# Patient Record
Sex: MALE | Race: WHITE | NOT HISPANIC OR LATINO | Employment: OTHER | URBAN - NONMETROPOLITAN AREA
[De-identification: names, ages, dates, MRNs, and addresses within clinical notes are randomized per-mention and may not be internally consistent; named-entity substitution may affect disease eponyms.]

---

## 2023-03-15 DIAGNOSIS — N52.9 ERECTILE DYSFUNCTION, UNSPECIFIED ERECTILE DYSFUNCTION TYPE: Primary | ICD-10-CM

## 2023-03-15 RX ORDER — ROSUVASTATIN CALCIUM 40 MG/1
1 TABLET, COATED ORAL DAILY
COMMUNITY
Start: 2022-09-21 | End: 2023-10-04 | Stop reason: ALTCHOICE

## 2023-03-15 RX ORDER — ROPINIROLE 3 MG/1
3 TABLET, FILM COATED ORAL DAILY
COMMUNITY
End: 2023-05-26 | Stop reason: SDUPTHER

## 2023-03-15 RX ORDER — TADALAFIL 2.5 MG/1
2.5 TABLET ORAL DAILY
COMMUNITY
Start: 2023-01-06 | End: 2023-03-28

## 2023-03-15 RX ORDER — TADALAFIL 2.5 MG/1
2.5 TABLET ORAL DAILY
Qty: 30 TABLET | Refills: 2 | OUTPATIENT
Start: 2023-03-15

## 2023-03-15 RX ORDER — POLYETHYLENE GLYCOL 3350 17 G/17G
17 POWDER, FOR SOLUTION ORAL DAILY
COMMUNITY
Start: 2022-11-12 | End: 2023-06-29 | Stop reason: WASHOUT

## 2023-03-22 PROBLEM — I10 HYPERTENSION: Status: ACTIVE | Noted: 2023-03-22

## 2023-03-22 PROBLEM — K44.9 HIATAL HERNIA: Status: ACTIVE | Noted: 2023-03-22

## 2023-03-22 PROBLEM — R93.1 ABNORMAL CT SCAN OF HEART: Status: ACTIVE | Noted: 2023-03-22

## 2023-03-22 PROBLEM — E66.811 CLASS 1 OBESITY DUE TO EXCESS CALORIES WITH SERIOUS COMORBIDITY AND BODY MASS INDEX (BMI) OF 30.0 TO 30.9 IN ADULT: Status: ACTIVE | Noted: 2023-03-22

## 2023-03-22 PROBLEM — E78.5 HYPERLIPEMIA: Status: ACTIVE | Noted: 2023-03-22

## 2023-03-22 PROBLEM — G25.81 RESTLESS LEGS SYNDROME: Status: ACTIVE | Noted: 2023-03-22

## 2023-03-22 PROBLEM — I83.891 VARICOSE VEINS OF RIGHT LOWER EXTREMITY WITH COMPLICATIONS: Status: ACTIVE | Noted: 2023-03-22

## 2023-03-22 PROBLEM — N52.9 ERECTILE DYSFUNCTION: Status: ACTIVE | Noted: 2023-03-22

## 2023-03-22 PROBLEM — E66.3 OVERWEIGHT WITH BODY MASS INDEX (BMI) OF 29 TO 29.9 IN ADULT: Status: ACTIVE | Noted: 2023-03-22

## 2023-03-22 PROBLEM — R00.0 SINUS TACHYCARDIA: Status: ACTIVE | Noted: 2023-03-22

## 2023-03-22 PROBLEM — R00.2 PALPITATIONS: Status: ACTIVE | Noted: 2023-03-22

## 2023-03-22 PROBLEM — R91.8 LUNG NODULES: Status: ACTIVE | Noted: 2023-03-22

## 2023-03-22 PROBLEM — E55.9 VITAMIN D DEFICIENCY: Status: ACTIVE | Noted: 2023-03-22

## 2023-03-22 PROBLEM — F31.81 BIPOLAR II DISORDER (MULTI): Status: ACTIVE | Noted: 2023-03-22

## 2023-03-22 PROBLEM — I25.10 CAD (CORONARY ARTERY DISEASE): Status: ACTIVE | Noted: 2023-03-22

## 2023-03-22 PROBLEM — E66.09 CLASS 1 OBESITY DUE TO EXCESS CALORIES WITH SERIOUS COMORBIDITY AND BODY MASS INDEX (BMI) OF 30.0 TO 30.9 IN ADULT: Status: ACTIVE | Noted: 2023-03-22

## 2023-03-22 PROBLEM — E66.811 CLASS 1 OBESITY DUE TO EXCESS CALORIES WITH SERIOUS COMORBIDITY AND BODY MASS INDEX (BMI) OF 31.0 TO 31.9 IN ADULT: Status: ACTIVE | Noted: 2023-03-22

## 2023-03-22 PROBLEM — E66.09 CLASS 1 OBESITY DUE TO EXCESS CALORIES WITH SERIOUS COMORBIDITY AND BODY MASS INDEX (BMI) OF 31.0 TO 31.9 IN ADULT: Status: ACTIVE | Noted: 2023-03-22

## 2023-03-22 PROBLEM — Z95.1 S/P CABG X 2: Status: ACTIVE | Noted: 2023-03-22

## 2023-03-22 RX ORDER — DULOXETIN HYDROCHLORIDE 30 MG/1
30 CAPSULE, DELAYED RELEASE ORAL DAILY
COMMUNITY
End: 2023-03-28 | Stop reason: SDUPTHER

## 2023-03-22 RX ORDER — ASCORBIC ACID 250 MG
500 TABLET ORAL DAILY
COMMUNITY

## 2023-03-22 RX ORDER — LAMOTRIGINE 200 MG/1
1 TABLET ORAL DAILY
COMMUNITY
End: 2023-05-01 | Stop reason: SDUPTHER

## 2023-03-22 RX ORDER — VIT C/E/ZN/COPPR/LUTEIN/ZEAXAN 250MG-90MG
CAPSULE ORAL
COMMUNITY

## 2023-03-22 RX ORDER — ISOSORBIDE MONONITRATE 30 MG/1
TABLET, EXTENDED RELEASE ORAL
COMMUNITY
End: 2023-06-29 | Stop reason: WASHOUT

## 2023-03-22 RX ORDER — ACETAMINOPHEN 325 MG/1
2 TABLET ORAL EVERY 6 HOURS PRN
COMMUNITY
End: 2023-06-29 | Stop reason: WASHOUT

## 2023-03-22 RX ORDER — TADALAFIL 5 MG/1
5 TABLET ORAL DAILY
COMMUNITY
End: 2023-03-24 | Stop reason: SDUPTHER

## 2023-03-22 RX ORDER — ASPIRIN 81 MG/1
1 TABLET ORAL DAILY
COMMUNITY
End: 2023-10-04 | Stop reason: SDUPTHER

## 2023-03-22 RX ORDER — DULOXETIN HYDROCHLORIDE 60 MG/1
1 CAPSULE, DELAYED RELEASE ORAL NIGHTLY
COMMUNITY
End: 2023-03-28 | Stop reason: SDUPTHER

## 2023-03-22 RX ORDER — METOPROLOL TARTRATE 50 MG/1
1.5 TABLET ORAL 2 TIMES DAILY
COMMUNITY
End: 2023-03-28 | Stop reason: SDUPTHER

## 2023-03-24 RX ORDER — TADALAFIL 5 MG/1
5 TABLET ORAL DAILY
Qty: 30 TABLET | Refills: 0 | Status: SHIPPED | OUTPATIENT
Start: 2023-03-24 | End: 2023-03-28 | Stop reason: SDUPTHER

## 2023-03-27 NOTE — PROGRESS NOTES
Subjective     Martín Mccormick is a 57 y.o. male who presents for No chief complaint on file..      HPI  The patient is a 57 year-old male presenting to the clinic for follow up on medications.   Discussed blood pressure medication.  Continue taking as directed.     He is scheduled with cardiologist.      Follow up in 6 months.      Educated on coronary artery disease.  Advised to keep up on the cardiologist.  Advised to call 911 if develops chest pain and/or heart palpitations and/or shortness of breath.  Educated on erectile dysfunction and continue the medication.  He is aware of combination effect with nitroglycerin and priapism and sudden sensorineural hearing loss.  Educated on vitamin deficiency.  At least of lung nodules.  Recommended CT scan of the chest every year.  Educated hypertension low-salt diet exercise.  Keep up on the cardiologist.  Educated on obesity and diet and exercise.  Advised to lose weight.  Educated on hyperlipidemia and diet exercise.  Has history of bipolar disorder.  Does not wish to go to the counselor for counseling.  Did not wish to go see the psychiatrist.  Discussed about medication dosage and adjustment.  Denies depression.  Denies suicidal.  Denies homicidal.                  Review of Systems  Review of systems  General.  Denies fever.  Denies chills.  HEENT denies nasal congestion.  Denies sinus pressure.  Respiratory.  Denies cough.  Denies shortness of breath.    Cardiovascular.  Denies chest pain.  Denies heart palpitations.  Denies shortness of breath.    Gastrointestinal.  Denies nausea vomiting diarrhea.  Denies abdominal pain.    Genitourinary denies burning urination.  Denies frequent urination.  Denies flank pain.  Denies blood in the urine.  Denies abnormal vaginal discharge.    Neurology.  Denies tingling numbness but denies weakness.  Denies headache.  Denies blurred vision.    Musculoskeletal.  Denies body aches.  Denies joint pains.  Denies muscle aches.  Denies  "muscle weakness    Endocrinology.  Denies cold intolerance.  Denies hot intolerance.    Psychiatric.  Denies depression.  Denies anxiety.  Denies suicidal.  Denies homicidal.                  Objective   /84 (BP Location: Left arm, Patient Position: Sitting, BP Cuff Size: Large adult)   Pulse 86   Ht 1.778 m (5' 10\")   Wt 101 kg (222 lb)   SpO2 99%   BMI 31.85 kg/m²        Physical Exam  General.  Not in distress.  HEENT normocephalic anicteric sclerae.  Neck soft supple no thyromegaly.  No carotid bruit.  Lungs are clear.  Heart regular.  Abdomen soft nontender nondistended bowel sounds are positive.  Extremities no clubbing cyanosis or edema.  Psychiatric.  Has good eye contact.  No crying spells noted.  Speech was normal.  Denies depression.  Denies suicidal.  Denies homicidal.      Assessment/Plan   1.  Coronary artery disease.  Dictated as above.    2.  Rectal dysfunction.  Dictated as above    3.  Vitamin D deficiency.  Educated on vitamin D deficiency we will continue monitor    4.  Lung nodules.  Dictated as above    5.  Primary hypertension.  Dictated as above.    6.  Obesity.  Dictated as above.    7.  Hyperlipidemia.  Educated on diet exercise.  Advised to lose weight we will continue monitor.    8.  Bipolar disorder.  Denies depression.  Denies suicidal.  Denies homicidal.  Explained adverse effects medication.                                   Problem List Items Addressed This Visit          Respiratory    Lung nodules       Circulatory    Hypertension    Relevant Medications    DULoxetine (Cymbalta) 60 mg DR capsule    metoprolol tartrate (Lopressor) 50 mg tablet    CAD (coronary artery disease) - Primary    Relevant Medications    metoprolol tartrate (Lopressor) 50 mg tablet    tadalafil (Cialis) 5 mg tablet    S/P CABG x 2       Genitourinary    Erectile dysfunction    Relevant Medications    tadalafil (Cialis) 5 mg tablet       Endocrine/Metabolic    Vitamin D deficiency    Class 1 " obesity due to excess calories with serious comorbidity and body mass index (BMI) of 31.0 to 31.9 in adult       Other    Bipolar II disorder (CMS/HCC)    Relevant Medications    DULoxetine (Cymbalta) 30 mg DR capsule    DULoxetine (Cymbalta) 60 mg DR capsule    Hyperlipemia       Scribe Attestation  By signing my name below, IEmma Scribe   attest that this documentation has been prepared under the direction and in the presence of Paul Napier MD.

## 2023-03-28 ENCOUNTER — OFFICE VISIT (OUTPATIENT)
Dept: PRIMARY CARE | Facility: CLINIC | Age: 58
End: 2023-03-28
Payer: COMMERCIAL

## 2023-03-28 VITALS
DIASTOLIC BLOOD PRESSURE: 84 MMHG | BODY MASS INDEX: 31.78 KG/M2 | SYSTOLIC BLOOD PRESSURE: 128 MMHG | WEIGHT: 222 LBS | OXYGEN SATURATION: 99 % | HEIGHT: 70 IN | HEART RATE: 86 BPM

## 2023-03-28 DIAGNOSIS — E78.2 MIXED HYPERLIPIDEMIA: ICD-10-CM

## 2023-03-28 DIAGNOSIS — E55.9 VITAMIN D DEFICIENCY: ICD-10-CM

## 2023-03-28 DIAGNOSIS — N52.9 ERECTILE DYSFUNCTION, UNSPECIFIED ERECTILE DYSFUNCTION TYPE: ICD-10-CM

## 2023-03-28 DIAGNOSIS — I10 PRIMARY HYPERTENSION: ICD-10-CM

## 2023-03-28 DIAGNOSIS — E66.09 CLASS 1 OBESITY DUE TO EXCESS CALORIES WITH SERIOUS COMORBIDITY AND BODY MASS INDEX (BMI) OF 31.0 TO 31.9 IN ADULT: ICD-10-CM

## 2023-03-28 DIAGNOSIS — F31.81 BIPOLAR II DISORDER (MULTI): ICD-10-CM

## 2023-03-28 DIAGNOSIS — R91.8 LUNG NODULES: ICD-10-CM

## 2023-03-28 DIAGNOSIS — I25.10 CORONARY ARTERY DISEASE INVOLVING NATIVE CORONARY ARTERY OF NATIVE HEART WITHOUT ANGINA PECTORIS: Primary | ICD-10-CM

## 2023-03-28 DIAGNOSIS — Z95.1 S/P CABG X 2: ICD-10-CM

## 2023-03-28 PROCEDURE — 3079F DIAST BP 80-89 MM HG: CPT | Performed by: FAMILY MEDICINE

## 2023-03-28 PROCEDURE — 1036F TOBACCO NON-USER: CPT | Performed by: FAMILY MEDICINE

## 2023-03-28 PROCEDURE — 3074F SYST BP LT 130 MM HG: CPT | Performed by: FAMILY MEDICINE

## 2023-03-28 PROCEDURE — 99214 OFFICE O/P EST MOD 30 MIN: CPT | Performed by: FAMILY MEDICINE

## 2023-03-28 PROCEDURE — 3008F BODY MASS INDEX DOCD: CPT | Performed by: FAMILY MEDICINE

## 2023-03-28 RX ORDER — DULOXETIN HYDROCHLORIDE 60 MG/1
60 CAPSULE, DELAYED RELEASE ORAL DAILY
Qty: 90 CAPSULE | Refills: 3 | Status: SHIPPED | OUTPATIENT
Start: 2023-03-28 | End: 2023-03-28

## 2023-03-28 RX ORDER — METOPROLOL TARTRATE 50 MG/1
75 TABLET ORAL 2 TIMES DAILY
Qty: 145 TABLET | Refills: 3 | Status: SHIPPED | OUTPATIENT
Start: 2023-03-28 | End: 2023-10-04 | Stop reason: SDUPTHER

## 2023-03-28 RX ORDER — TADALAFIL 5 MG/1
5 TABLET ORAL DAILY
Qty: 90 TABLET | Refills: 0 | Status: SHIPPED | OUTPATIENT
Start: 2023-03-28 | End: 2023-06-21

## 2023-03-28 RX ORDER — DULOXETIN HYDROCHLORIDE 30 MG/1
30 CAPSULE, DELAYED RELEASE ORAL DAILY
Qty: 90 CAPSULE | Refills: 3 | Status: SHIPPED | OUTPATIENT
Start: 2023-03-28 | End: 2023-03-28

## 2023-03-28 ASSESSMENT — PAIN SCALES - GENERAL: PAINLEVEL: 0-NO PAIN

## 2023-04-10 ENCOUNTER — APPOINTMENT (OUTPATIENT)
Dept: PRIMARY CARE | Facility: CLINIC | Age: 58
End: 2023-04-10
Payer: COMMERCIAL

## 2023-04-13 ENCOUNTER — TELEMEDICINE (OUTPATIENT)
Dept: PRIMARY CARE | Facility: CLINIC | Age: 58
End: 2023-04-13
Payer: COMMERCIAL

## 2023-04-13 DIAGNOSIS — J30.89 NON-SEASONAL ALLERGIC RHINITIS DUE TO OTHER ALLERGIC TRIGGER: ICD-10-CM

## 2023-04-13 DIAGNOSIS — J01.01 ACUTE RECURRENT MAXILLARY SINUSITIS: ICD-10-CM

## 2023-04-13 DIAGNOSIS — J40 BRONCHITIS: Primary | ICD-10-CM

## 2023-04-13 PROCEDURE — 99213 OFFICE O/P EST LOW 20 MIN: CPT | Performed by: FAMILY MEDICINE

## 2023-04-13 RX ORDER — PROMETHAZINE HYDROCHLORIDE AND DEXTROMETHORPHAN HYDROBROMIDE 6.25; 15 MG/5ML; MG/5ML
5 SYRUP ORAL 4 TIMES DAILY PRN
Qty: 140 ML | Refills: 0 | Status: SHIPPED | OUTPATIENT
Start: 2023-04-13 | End: 2023-04-20

## 2023-04-13 RX ORDER — AMOXICILLIN AND CLAVULANATE POTASSIUM 875; 125 MG/1; MG/1
875 TABLET, FILM COATED ORAL 2 TIMES DAILY
Qty: 20 TABLET | Refills: 0 | Status: SHIPPED | OUTPATIENT
Start: 2023-04-13 | End: 2023-04-23

## 2023-04-13 RX ORDER — METHYLPREDNISOLONE 4 MG/1
TABLET ORAL
Qty: 21 TABLET | Refills: 0 | Status: SHIPPED | OUTPATIENT
Start: 2023-04-13 | End: 2023-04-20

## 2023-04-13 RX ORDER — FLUTICASONE PROPIONATE 50 MCG
2 SPRAY, SUSPENSION (ML) NASAL DAILY
Qty: 16 G | Refills: 0 | Status: SHIPPED | OUTPATIENT
Start: 2023-04-13 | End: 2023-05-06

## 2023-04-13 RX ORDER — ALBUTEROL SULFATE 90 UG/1
2 AEROSOL, METERED RESPIRATORY (INHALATION) EVERY 4 HOURS PRN
Qty: 6.7 G | Refills: 0 | Status: SHIPPED | OUTPATIENT
Start: 2023-04-13 | End: 2023-06-29 | Stop reason: WASHOUT

## 2023-04-16 NOTE — PROGRESS NOTES
Subjective     Martín Mccormick is a 57 y.o. male who presents for Cough and URI (Cough/congestion).        This was completed via telephone due to the restrictions of the COVID-19 pandemic.  All issues as below were discussed and addressed but no physical exam was performed.  If it was felt that the patient should be evaluated in clinic then they were director there.  The patient/parent verbally consented to the visit.      HPI    Coughing and chest congestion.  Home COVID test was negative.  Sinus pressure facial pain headache and low-grade fever.  Sneezing and clearing throat.  Over-the-counter medications not helping.        Review of Systems  Dictated as above  Objective   Virtual visit  Physical Exam  Virtual visit  Assessment/Plan   1.  Bronchitis.  Home COVID test was negative.  Denies shortness of breath.  Advised to call 911 or to go to the emergency room as soon as possible if develops high fever or shortness of breath.    2.  Acute recurrent maxillary sinusitis but educated on sinusitis.  Advised to increase oral fluid intake    3.  Allergic rhinitis.  Educated on allergic rhinitis.  Advised to increase oral fluid intake.                Problem List Items Addressed This Visit    None  Visit Diagnoses       Bronchitis    -  Primary    Relevant Medications    amoxicillin-pot clavulanate (Augmentin) 875-125 mg tablet    methylPREDNISolone (Medrol Dospak) 4 mg tablets    promethazine-DM (Phenergan-DM) 6.25-15 mg/5 mL syrup    albuterol (Proventil HFA) 90 mcg/actuation inhaler    Acute recurrent maxillary sinusitis        Relevant Medications    amoxicillin-pot clavulanate (Augmentin) 875-125 mg tablet    methylPREDNISolone (Medrol Dospak) 4 mg tablets    Non-seasonal allergic rhinitis due to other allergic trigger        Relevant Medications    methylPREDNISolone (Medrol Dospak) 4 mg tablets    fluticasone (Flonase) 50 mcg/actuation nasal spray

## 2023-05-01 DIAGNOSIS — F31.9 BIPOLAR AFFECTIVE DISORDER, REMISSION STATUS UNSPECIFIED (MULTI): Primary | ICD-10-CM

## 2023-05-01 RX ORDER — LAMOTRIGINE 200 MG/1
200 TABLET ORAL DAILY
Qty: 90 TABLET | Refills: 0 | Status: SHIPPED | OUTPATIENT
Start: 2023-05-01 | End: 2023-07-26 | Stop reason: SDUPTHER

## 2023-05-26 DIAGNOSIS — G25.81 RESTLESS LEGS SYNDROME: ICD-10-CM

## 2023-05-26 RX ORDER — ROPINIROLE 3 MG/1
3 TABLET, FILM COATED ORAL DAILY
Qty: 90 TABLET | Refills: 0 | Status: SHIPPED | OUTPATIENT
Start: 2023-05-26 | End: 2023-08-23 | Stop reason: SDUPTHER

## 2023-06-10 DIAGNOSIS — N52.9 ERECTILE DYSFUNCTION, UNSPECIFIED ERECTILE DYSFUNCTION TYPE: ICD-10-CM

## 2023-06-10 DIAGNOSIS — J30.89 NON-SEASONAL ALLERGIC RHINITIS DUE TO OTHER ALLERGIC TRIGGER: ICD-10-CM

## 2023-06-12 RX ORDER — FLUTICASONE PROPIONATE 50 MCG
2 SPRAY, SUSPENSION (ML) NASAL DAILY
Qty: 16 ML | Refills: 0 | Status: SHIPPED | OUTPATIENT
Start: 2023-06-12 | End: 2023-06-26

## 2023-06-20 RX ORDER — TADALAFIL 5 MG/1
5 TABLET ORAL DAILY
Qty: 90 TABLET | Refills: 0 | OUTPATIENT
Start: 2023-06-20 | End: 2023-09-18

## 2023-06-21 ENCOUNTER — TELEPHONE (OUTPATIENT)
Dept: PRIMARY CARE | Facility: CLINIC | Age: 58
End: 2023-06-21
Payer: COMMERCIAL

## 2023-06-21 DIAGNOSIS — N52.9 ERECTILE DYSFUNCTION, UNSPECIFIED ERECTILE DYSFUNCTION TYPE: ICD-10-CM

## 2023-06-21 RX ORDER — TADALAFIL 5 MG/1
5 TABLET ORAL DAILY
Qty: 30 TABLET | Refills: 0 | Status: SHIPPED | OUTPATIENT
Start: 2023-06-21 | End: 2023-07-26 | Stop reason: SDUPTHER

## 2023-06-27 NOTE — PROGRESS NOTES
New patient here to establish care from Dr. Napier. Just started BP mendication yesterday from cardiology    This is a 58yo male here to establish care:    CAD, tachycardia, HTN: Following with cardiology, Dr. Edwards. He had cardiac bypass detected through calcium score. Bypass Nov 2022 (two vessel). Started on chlorthalidone. He is taking metoprolol and rosuvastatin.    Bipolar disorder: On cymbalta and lamictal, feels these are helping. No SE's.     RLS: He is taking higher dose ropinirole which is helping.    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 Radiation to the area anterior to his heart which contributed to his cardiac condition.      Review of systems completed and unremarkable other than what is documented in HPI.    Social history: he uses chewing tobacco, 3 dips a day, he drinks alcohol 4 bottles of beer per day, retired now for almost 2 years from   Medical history:  Medications: Vitamin D, MVI, flonase, aspirin, rosuvastatin, albuterol, tylenol, lamictal, ropinirole, duloxetine, tadalafil, Vitamin C, metoprolol, aspirin, chlorthalidone  SurgHx: bypass, cardiac, finger surgery for fracture, tonsillectomy  Fhx: mom has cardiac dx and DM, dad has HTN, grandmother had stroke, cousin who had B cell lymphoma. Maternal grandfather had cancer as well, thought to be lymphoma. Materna grandmother also had breast cancer but ultimately diedfrom a stroke.  Allergies: fluconazole    Gen: No acute distress. Alert and oriented x3.   HEENT: Normocephalic, atraumatic. PERRLA and EOMI, no conjunctival injection. B/L EAC are clear, TM's viewed are WNL. No rhinorrhea, no oropharyngeal lesions.  Neck: No lymphadenopathy, thyroid WNL.  CV: Regular rate and rhythm. Normal S1/S2.  Resp: CTAB/L. No wheezes or rhonchi appreciated.  Abdomen: Soft. Nontender. Nondistended. Bowel sounds normoactive. No guarding or rigidity.  Derm: Skin is warm and dry. No rashes  appreciated or suspicious lesions noted.   Neuro: Cranial nerves intact. Normal gait.  Psych: Appropriate mood and affect. Normal speech and eye contact.   Extremities: No deformities appreciated. No severe edema.     This is a 58yo male here to establish care:    #CAD  S/p two vessel bypass Nov 2022  Following with cardiology, Dr. Edwards  Currently on metoprolol and rosuvastatin  Starting chlorthalidone    #Bipolar disorder:   Reasonable control on cymbalta and lamictal    #RLS:   Controlled on ropinirole    #H/o lymphoblastic lymphoma:   T cell lymphoma  s/p chemo and radiation for this 12 years ago    #Vitamin D deficiency   On replacement    HCM:  Will discuss C-scope vs cologuard at followup  Monitoring yearly PSA

## 2023-06-28 LAB
CHOLESTEROL (MG/DL) IN SER/PLAS: 175 MG/DL (ref 0–199)
CHOLESTEROL IN HDL (MG/DL) IN SER/PLAS: 61.5 MG/DL
CHOLESTEROL/HDL RATIO: 2.8
LDL: 84 MG/DL (ref 0–99)
THYROTROPIN (MIU/L) IN SER/PLAS BY DETECTION LIMIT <= 0.05 MIU/L: 1.46 MIU/L (ref 0.44–3.98)
TRIGLYCERIDE (MG/DL) IN SER/PLAS: 146 MG/DL (ref 0–149)
VLDL: 29 MG/DL (ref 0–40)

## 2023-06-29 ENCOUNTER — OFFICE VISIT (OUTPATIENT)
Dept: PRIMARY CARE | Facility: CLINIC | Age: 58
End: 2023-06-29
Payer: COMMERCIAL

## 2023-06-29 VITALS
DIASTOLIC BLOOD PRESSURE: 92 MMHG | HEIGHT: 70 IN | SYSTOLIC BLOOD PRESSURE: 138 MMHG | BODY MASS INDEX: 31.35 KG/M2 | WEIGHT: 219 LBS | HEART RATE: 70 BPM

## 2023-06-29 DIAGNOSIS — F31.9 BIPOLAR 1 DISORDER (MULTI): ICD-10-CM

## 2023-06-29 DIAGNOSIS — E55.9 VITAMIN D DEFICIENCY: ICD-10-CM

## 2023-06-29 DIAGNOSIS — I25.10 CORONARY ARTERY DISEASE INVOLVING NATIVE CORONARY ARTERY OF NATIVE HEART WITHOUT ANGINA PECTORIS: ICD-10-CM

## 2023-06-29 DIAGNOSIS — Z12.5 SCREENING FOR PROSTATE CANCER: Primary | ICD-10-CM

## 2023-06-29 LAB
ESTIMATED AVERAGE GLUCOSE FOR HBA1C: 103 MG/DL
HEMOGLOBIN A1C/HEMOGLOBIN TOTAL IN BLOOD: 5.2 %

## 2023-06-29 PROCEDURE — 3075F SYST BP GE 130 - 139MM HG: CPT | Performed by: FAMILY MEDICINE

## 2023-06-29 PROCEDURE — 3008F BODY MASS INDEX DOCD: CPT | Performed by: FAMILY MEDICINE

## 2023-06-29 PROCEDURE — 3080F DIAST BP >= 90 MM HG: CPT | Performed by: FAMILY MEDICINE

## 2023-06-29 PROCEDURE — 99214 OFFICE O/P EST MOD 30 MIN: CPT | Performed by: FAMILY MEDICINE

## 2023-06-29 RX ORDER — CHLORTHALIDONE 25 MG/1
TABLET ORAL
COMMUNITY
Start: 2023-06-27 | End: 2023-10-04 | Stop reason: SDUPTHER

## 2023-07-26 DIAGNOSIS — N52.9 ERECTILE DYSFUNCTION, UNSPECIFIED ERECTILE DYSFUNCTION TYPE: ICD-10-CM

## 2023-07-26 DIAGNOSIS — F31.9 BIPOLAR AFFECTIVE DISORDER, REMISSION STATUS UNSPECIFIED (MULTI): ICD-10-CM

## 2023-07-26 RX ORDER — LAMOTRIGINE 200 MG/1
200 TABLET ORAL DAILY
Qty: 90 TABLET | Refills: 0 | Status: SHIPPED | OUTPATIENT
Start: 2023-07-26 | End: 2023-07-26

## 2023-07-26 RX ORDER — TADALAFIL 5 MG/1
5 TABLET ORAL DAILY
Qty: 30 TABLET | Refills: 0 | Status: SHIPPED | OUTPATIENT
Start: 2023-07-26 | End: 2023-09-07

## 2023-08-23 DIAGNOSIS — G25.81 RESTLESS LEGS SYNDROME: ICD-10-CM

## 2023-08-23 RX ORDER — ROPINIROLE 3 MG/1
3 TABLET, FILM COATED ORAL DAILY
Qty: 90 TABLET | Refills: 1 | Status: SHIPPED | OUTPATIENT
Start: 2023-08-23 | End: 2023-08-23

## 2023-09-07 DIAGNOSIS — N52.9 ERECTILE DYSFUNCTION, UNSPECIFIED ERECTILE DYSFUNCTION TYPE: ICD-10-CM

## 2023-09-07 RX ORDER — TADALAFIL 5 MG/1
5 TABLET ORAL DAILY
Qty: 90 TABLET | Refills: 0 | Status: SHIPPED | OUTPATIENT
Start: 2023-09-07 | End: 2023-09-07

## 2023-09-07 RX ORDER — ALUMINUM CHLORIDE 20 %
SOLUTION, NON-ORAL TOPICAL
COMMUNITY
Start: 2023-06-29 | End: 2023-12-30 | Stop reason: ALTCHOICE

## 2023-09-07 RX ORDER — TADALAFIL 5 MG/1
5 TABLET ORAL DAILY
Qty: 30 TABLET | Refills: 0 | Status: SHIPPED | OUTPATIENT
Start: 2023-09-07 | End: 2023-09-07 | Stop reason: SDUPTHER

## 2023-09-27 DIAGNOSIS — E34.9 TESTOSTERONE DEFICIENCY: ICD-10-CM

## 2023-09-27 NOTE — PROGRESS NOTES
"Martín Mccormick is a 57 y.o. male who presents for Follow-up (6 months; high BP, meds managed by cardio; had flu shot and 1st shingles vax already)    CAD, tachycardia, HTN: Following with cardiology, Dr. Edwards. He had cardiac bypass detected through calcium score. Bypass Nov 2022 (two vessel). Started on chlorthalidone. He is taking metoprolol and rosuvastatin.     Bipolar disorder: On cymbalta and lamictal, feels these are helping. No SE's.      RLS: He is taking higher dose ropinirole which is helping.     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 Radiation to the area anterior to his heart which contributed to his cardiac condition.       Review of systems completed and unremarkable other than what is documented in HPI.    Objective   BP (!) 148/97 (BP Location: Left arm, Patient Position: Sitting, BP Cuff Size: Large adult)   Pulse 81   Ht 1.778 m (5' 10\")   Wt 102 kg (225 lb)   BMI 32.28 kg/m²     Gen: No acute distress, alert and oriented x3, pleasant   HEENT: moist mucous membranes, b/l external auditory canals are clear of debris, TMs within normal limits, no oropharyngeal lesions, eomi, perrla   Neck: thyroid within normal limits, no lymphadenopathy   CV: RRR, normal S1/S2, no murmur   Resp: Clear to auscultation bilaterally, no wheezes or rhonchi appreciated  Abd: soft, nontender, non-distended, no guarding/rigidity, bowel sounds present  Extr: no edema, no calf tenderness  Derm: Skin is warm and dry, no rashes appreciated  Psych: mood is good, affect is congruent, good hygiene, normal speech and eye contact  Neuro: cranial nerves grossly intact, normal gait    Assessment/Plan     #CAD  S/p two vessel bypass Nov 2022  Following with cardiology, Dr. Edwards  Currently on metoprolol, chlorthalidone, and rosuvastatin  Increasing metoprolol today     #Bipolar disorder:   Reasonable control on cymbalta and lamictal     #RLS:   Controlled on " ropinirole     #H/o lymphoblastic lymphoma:   T cell lymphoma  s/p chemo and radiation for this 12 years ago     #Vitamin D deficiency   On replacement     HCM:  Will discuss C-scope vs cologuard at followup  Monitoring yearly PSA  S/p flu and shingles

## 2023-09-28 ENCOUNTER — APPOINTMENT (OUTPATIENT)
Dept: PRIMARY CARE | Facility: CLINIC | Age: 58
End: 2023-09-28
Payer: COMMERCIAL

## 2023-10-03 ENCOUNTER — LAB (OUTPATIENT)
Dept: LAB | Facility: LAB | Age: 58
End: 2023-10-03
Payer: COMMERCIAL

## 2023-10-03 DIAGNOSIS — F31.9 BIPOLAR 1 DISORDER (MULTI): ICD-10-CM

## 2023-10-03 DIAGNOSIS — E34.9 TESTOSTERONE DEFICIENCY: ICD-10-CM

## 2023-10-03 DIAGNOSIS — I25.10 CORONARY ARTERY DISEASE INVOLVING NATIVE CORONARY ARTERY OF NATIVE HEART WITHOUT ANGINA PECTORIS: ICD-10-CM

## 2023-10-03 DIAGNOSIS — Z12.5 SCREENING FOR PROSTATE CANCER: ICD-10-CM

## 2023-10-03 DIAGNOSIS — E55.9 VITAMIN D DEFICIENCY: ICD-10-CM

## 2023-10-03 LAB
ALBUMIN SERPL BCP-MCNC: 4.3 G/DL (ref 3.4–5)
ALP SERPL-CCNC: 69 U/L (ref 33–120)
ALT SERPL W P-5'-P-CCNC: 51 U/L (ref 10–52)
ANION GAP SERPL CALC-SCNC: 12 MMOL/L (ref 10–20)
AST SERPL W P-5'-P-CCNC: 48 U/L (ref 9–39)
BASOPHILS # BLD AUTO: 0.05 X10*3/UL (ref 0–0.1)
BASOPHILS NFR BLD AUTO: 1 %
BILIRUB SERPL-MCNC: 0.8 MG/DL (ref 0–1.2)
BUN SERPL-MCNC: 16 MG/DL (ref 6–23)
CALCIUM SERPL-MCNC: 9.5 MG/DL (ref 8.6–10.3)
CHLORIDE SERPL-SCNC: 99 MMOL/L (ref 98–107)
CHOLEST SERPL-MCNC: 191 MG/DL (ref 0–199)
CHOLESTEROL/HDL RATIO: 2.9
CO2 SERPL-SCNC: 32 MMOL/L (ref 21–32)
CREAT SERPL-MCNC: 0.85 MG/DL (ref 0.5–1.3)
EOSINOPHIL # BLD AUTO: 0.18 X10*3/UL (ref 0–0.7)
EOSINOPHIL NFR BLD AUTO: 3.5 %
ERYTHROCYTE [DISTWIDTH] IN BLOOD BY AUTOMATED COUNT: 12.4 % (ref 11.5–14.5)
GFR SERPL CREATININE-BSD FRML MDRD: >90 ML/MIN/1.73M*2
GLUCOSE SERPL-MCNC: 98 MG/DL (ref 74–99)
HCT VFR BLD AUTO: 47.3 % (ref 41–52)
HDLC SERPL-MCNC: 66 MG/DL
HGB BLD-MCNC: 16 G/DL (ref 13.5–17.5)
IMM GRANULOCYTES # BLD AUTO: 0.02 X10*3/UL (ref 0–0.7)
IMM GRANULOCYTES NFR BLD AUTO: 0.4 % (ref 0–0.9)
LDLC SERPL CALC-MCNC: 98 MG/DL (ref 140–190)
LYMPHOCYTES # BLD AUTO: 1.57 X10*3/UL (ref 1.2–4.8)
LYMPHOCYTES NFR BLD AUTO: 30.4 %
MCH RBC QN AUTO: 31.2 PG (ref 26–34)
MCHC RBC AUTO-ENTMCNC: 33.8 G/DL (ref 32–36)
MCV RBC AUTO: 92 FL (ref 80–100)
MONOCYTES # BLD AUTO: 0.42 X10*3/UL (ref 0.1–1)
MONOCYTES NFR BLD AUTO: 8.1 %
NEUTROPHILS # BLD AUTO: 2.92 X10*3/UL (ref 1.2–7.7)
NEUTROPHILS NFR BLD AUTO: 56.6 %
NON HDL CHOLESTEROL: 125 MG/DL (ref 0–149)
NRBC BLD-RTO: 0 /100 WBCS (ref 0–0)
PLATELET # BLD AUTO: 182 X10*3/UL (ref 150–450)
PMV BLD AUTO: 10.2 FL (ref 7.5–11.5)
POTASSIUM SERPL-SCNC: 4.2 MMOL/L (ref 3.5–5.3)
PROT SERPL-MCNC: 6.8 G/DL (ref 6.4–8.2)
RBC # BLD AUTO: 5.13 X10*6/UL (ref 4.5–5.9)
SODIUM SERPL-SCNC: 139 MMOL/L (ref 136–145)
TRIGL SERPL-MCNC: 134 MG/DL (ref 0–149)
VLDL: 27 MG/DL (ref 0–40)
WBC # BLD AUTO: 5.2 X10*3/UL (ref 4.4–11.3)

## 2023-10-03 PROCEDURE — 36415 COLL VENOUS BLD VENIPUNCTURE: CPT

## 2023-10-04 ENCOUNTER — OFFICE VISIT (OUTPATIENT)
Dept: PRIMARY CARE | Facility: CLINIC | Age: 58
End: 2023-10-04
Payer: COMMERCIAL

## 2023-10-04 ENCOUNTER — PHARMACY VISIT (OUTPATIENT)
Dept: PHARMACY | Facility: CLINIC | Age: 58
End: 2023-10-04
Payer: COMMERCIAL

## 2023-10-04 VITALS
BODY MASS INDEX: 32.21 KG/M2 | HEART RATE: 81 BPM | SYSTOLIC BLOOD PRESSURE: 148 MMHG | DIASTOLIC BLOOD PRESSURE: 97 MMHG | HEIGHT: 70 IN | WEIGHT: 225 LBS

## 2023-10-04 DIAGNOSIS — Z12.11 ENCOUNTER FOR SCREENING FOR MALIGNANT NEOPLASM OF COLON: ICD-10-CM

## 2023-10-04 DIAGNOSIS — I10 PRIMARY HYPERTENSION: Primary | ICD-10-CM

## 2023-10-04 LAB
25(OH)D3 SERPL-MCNC: 53 NG/ML (ref 30–100)
LAMOTRIGINE SERPL-MCNC: 4.8 UG/ML (ref 2.5–15)
PSA SERPL-MCNC: 1.07 NG/ML

## 2023-10-04 PROCEDURE — 3008F BODY MASS INDEX DOCD: CPT | Performed by: FAMILY MEDICINE

## 2023-10-04 PROCEDURE — 3080F DIAST BP >= 90 MM HG: CPT | Performed by: FAMILY MEDICINE

## 2023-10-04 PROCEDURE — 3077F SYST BP >= 140 MM HG: CPT | Performed by: FAMILY MEDICINE

## 2023-10-04 PROCEDURE — 99214 OFFICE O/P EST MOD 30 MIN: CPT | Performed by: FAMILY MEDICINE

## 2023-10-04 PROCEDURE — RXMED WILLOW AMBULATORY MEDICATION CHARGE

## 2023-10-04 RX ORDER — METOPROLOL SUCCINATE 50 MG/1
50 TABLET, EXTENDED RELEASE ORAL DAILY
Qty: 30 TABLET | Refills: 5 | Status: SHIPPED | OUTPATIENT
Start: 2023-10-04 | End: 2024-01-26 | Stop reason: WASHOUT

## 2023-10-17 ENCOUNTER — PREP FOR PROCEDURE (OUTPATIENT)
Dept: SURGERY | Facility: HOSPITAL | Age: 58
End: 2023-10-17
Payer: COMMERCIAL

## 2023-10-17 ENCOUNTER — PHARMACY VISIT (OUTPATIENT)
Dept: PHARMACY | Facility: CLINIC | Age: 58
End: 2023-10-17
Payer: COMMERCIAL

## 2023-10-17 ENCOUNTER — TELEPHONE (OUTPATIENT)
Dept: PRIMARY CARE | Facility: CLINIC | Age: 58
End: 2023-10-17
Payer: COMMERCIAL

## 2023-10-17 DIAGNOSIS — Z12.11 COLON CANCER SCREENING: ICD-10-CM

## 2023-10-17 DIAGNOSIS — Z12.11 SCREENING FOR COLON CANCER: Primary | ICD-10-CM

## 2023-10-17 PROCEDURE — RXMED WILLOW AMBULATORY MEDICATION CHARGE

## 2023-10-17 RX ORDER — SOD SULF/POT CHLORIDE/MAG SULF 1.479 G
TABLET ORAL
Qty: 24 TABLET | Refills: 0 | Status: SHIPPED | OUTPATIENT
Start: 2023-10-17 | End: 2024-01-26 | Stop reason: WASHOUT

## 2023-10-17 NOTE — TELEPHONE ENCOUNTER
[place] back from lab. It was drawn in [place] on 10/3 and sent to the send out lab on 10/4. There are no updates since then. Per lab supervisor, some results that are sent out are taking longer than normal due to Epic update and to give it a few more days. If there are no updates after that we are to call the lab back and have this escalated to client services to track down the sample.

## 2023-10-17 NOTE — TELEPHONE ENCOUNTER
Called outpatient lab to ask about Martín's testosterone level as it was drawn 10/3 and still active-in process. Spoke with Angie at the lab and am awaiting call back.

## 2023-10-19 ENCOUNTER — PHARMACY VISIT (OUTPATIENT)
Dept: PHARMACY | Facility: CLINIC | Age: 58
End: 2023-10-19
Payer: COMMERCIAL

## 2023-10-19 DIAGNOSIS — F31.9 BIPOLAR AFFECTIVE DISORDER, REMISSION STATUS UNSPECIFIED (MULTI): ICD-10-CM

## 2023-10-19 PROCEDURE — RXMED WILLOW AMBULATORY MEDICATION CHARGE

## 2023-10-19 RX ORDER — LAMOTRIGINE 200 MG/1
200 TABLET ORAL DAILY
Qty: 90 TABLET | Refills: 0 | Status: SHIPPED | OUTPATIENT
Start: 2023-10-19 | End: 2023-12-04 | Stop reason: SDUPTHER

## 2023-11-07 ENCOUNTER — APPOINTMENT (OUTPATIENT)
Dept: ORTHOPEDIC SURGERY | Facility: CLINIC | Age: 58
End: 2023-11-07
Payer: COMMERCIAL

## 2023-11-09 ENCOUNTER — PHARMACY VISIT (OUTPATIENT)
Dept: PHARMACY | Facility: CLINIC | Age: 58
End: 2023-11-09
Payer: COMMERCIAL

## 2023-11-09 PROCEDURE — RXMED WILLOW AMBULATORY MEDICATION CHARGE

## 2023-11-28 ENCOUNTER — APPOINTMENT (OUTPATIENT)
Dept: ORTHOPEDIC SURGERY | Facility: CLINIC | Age: 58
End: 2023-11-28
Payer: COMMERCIAL

## 2023-11-28 DIAGNOSIS — M25.551 BILATERAL HIP PAIN: Primary | ICD-10-CM

## 2023-11-28 DIAGNOSIS — M25.552 BILATERAL HIP PAIN: Primary | ICD-10-CM

## 2023-11-29 ENCOUNTER — PHARMACY VISIT (OUTPATIENT)
Dept: PHARMACY | Facility: CLINIC | Age: 58
End: 2023-11-29
Payer: COMMERCIAL

## 2023-11-29 DIAGNOSIS — N52.9 ERECTILE DYSFUNCTION, UNSPECIFIED ERECTILE DYSFUNCTION TYPE: ICD-10-CM

## 2023-11-29 PROCEDURE — RXMED WILLOW AMBULATORY MEDICATION CHARGE

## 2023-11-29 RX ORDER — TADALAFIL 5 MG/1
TABLET ORAL
Qty: 90 TABLET | Refills: 0 | Status: SHIPPED | OUTPATIENT
Start: 2023-11-29 | End: 2023-12-04 | Stop reason: SDUPTHER

## 2023-12-04 ENCOUNTER — PHARMACY VISIT (OUTPATIENT)
Dept: PHARMACY | Facility: CLINIC | Age: 58
End: 2023-12-04
Payer: COMMERCIAL

## 2023-12-04 DIAGNOSIS — F31.9 BIPOLAR AFFECTIVE DISORDER, REMISSION STATUS UNSPECIFIED (MULTI): ICD-10-CM

## 2023-12-04 DIAGNOSIS — N52.9 ERECTILE DYSFUNCTION, UNSPECIFIED ERECTILE DYSFUNCTION TYPE: ICD-10-CM

## 2023-12-04 RX ORDER — TADALAFIL 5 MG/1
TABLET ORAL
Qty: 90 TABLET | Refills: 0 | Status: SHIPPED | OUTPATIENT
Start: 2023-12-04 | End: 2024-04-04 | Stop reason: ALTCHOICE

## 2023-12-04 RX ORDER — LAMOTRIGINE 200 MG/1
200 TABLET ORAL DAILY
Qty: 90 TABLET | Refills: 0 | Status: SHIPPED | OUTPATIENT
Start: 2023-12-04 | End: 2024-03-26 | Stop reason: SDUPTHER

## 2023-12-06 PROCEDURE — RXMED WILLOW AMBULATORY MEDICATION CHARGE

## 2023-12-07 ENCOUNTER — PHARMACY VISIT (OUTPATIENT)
Dept: PHARMACY | Facility: CLINIC | Age: 58
End: 2023-12-07
Payer: COMMERCIAL

## 2023-12-07 PROCEDURE — RXMED WILLOW AMBULATORY MEDICATION CHARGE

## 2023-12-19 ENCOUNTER — PRE-ADMISSION TESTING (OUTPATIENT)
Dept: PREADMISSION TESTING | Facility: HOSPITAL | Age: 58
End: 2023-12-19
Payer: COMMERCIAL

## 2023-12-19 ENCOUNTER — ANESTHESIA EVENT (OUTPATIENT)
Dept: ANESTHESIOLOGY | Facility: HOSPITAL | Age: 58
End: 2023-12-19

## 2023-12-19 NOTE — ANESTHESIA PREPROCEDURE EVALUATION
Patient: Martín Mccormick    Procedure Information    Date: 12/19/23  Reason: PAT     There were no vitals filed for this visit.    Past Surgical History:   Procedure Laterality Date    OTHER SURGICAL HISTORY  07/11/2022    Tonsillectomy    OTHER SURGICAL HISTORY  07/11/2022    Finger surgical procedure     Past Medical History:   Diagnosis Date    Generalized anxiety disorder 01/08/2021    MILDRED (generalized anxiety disorder)    Other chest pain 11/26/2013    Atypical chest pain    Other specified anxiety disorders 09/10/2020    Anticipatory anxiety    Pain in right leg 05/01/2019    Pain of right lower extremity    Personal history of non-Hodgkin lymphomas     History of non-Hodgkin's lymphoma    Personal history of other diseases of the circulatory system 11/19/2013    History of abnormal electrocardiography    Personal history of other mental and behavioral disorders 10/23/2019    History of anxiety    Personal history of other mental and behavioral disorders 05/22/2020    History of panic attacks    Personal history of other specified conditions 08/21/2019    History of fatigue    Social phobia, unspecified 01/08/2021    Social anxiety disorder       Current Outpatient Medications:     aluminum chloride (Drysol) 20 % external solution, APPLY AS DIRECTED TO AFFECTED AREA(S), Disp: 35 mL, Rfl: 1    ascorbic acid (Vitamin C) 250 mg tablet, Take 2 tablets (500 mg) by mouth once daily., Disp: , Rfl:     aspirin 81 mg EC tablet, TAKE 1 TABLET BY MOUTH ONCE DAILY AS DIRECTED., Disp: 30 tablet, Rfl: 11    chlorthalidone (Hygroton) 25 mg tablet, TAKE 1 TABLET BY MOUTH ONCE DAILY IN THE MORNING, Disp: 30 tablet, Rfl: 11    cholecalciferol (Vitamin D-3) 25 MCG (1000 UT) capsule, Take by mouth., Disp: , Rfl:     DAILY MULTI-VITAMIN ORAL, Take by mouth., Disp: , Rfl:     Drysol Dab-O-Matic 20 % external solution, , Disp: , Rfl:     DULoxetine (Cymbalta) 30 mg DR capsule, TAKE 1 CAPSULE (30 MG) BY MOUTH ONCE DAILY., Disp: 90  capsule, Rfl: 3    DULoxetine (Cymbalta) 60 mg DR capsule, TAKE 1 CAPSULE (60 MG) BY MOUTH ONCE DAILY., Disp: 90 capsule, Rfl: 3    lamoTRIgine (LaMICtal) 200 mg tablet, TAKE 1 TABLET (200 MG) BY MOUTH ONCE DAILY., Disp: 90 tablet, Rfl: 0    metoprolol succinate XL (Toprol-XL) 50 mg 24 hr tablet, Take 1 tablet (50 mg) by mouth once daily. Do not crush or chew., Disp: 30 tablet, Rfl: 5    metoprolol tartrate (Lopressor) 25 mg tablet, TAKE 3 TABLETS(75MG) BY MOUTH TWICE DAILY. TAKE WITH FOOD. TAKE IN THE MORNING AND BEFORE BEDTIME AS DIRECTED, Disp: 540 tablet, Rfl: 3    multivitamin with minerals (multivitamin-iron-folic acid) tablet, Take by mouth., Disp: , Rfl:     rOPINIRole (Requip) 3 mg tablet, TAKE 1 TABLET (3 MG) BY MOUTH ONCE DAILY., Disp: 90 tablet, Rfl: 1    rosuvastatin (Crestor) 40 mg tablet, TAKE 1 TABLET BY MOUTH ONCE DAILY, Disp: 90 tablet, Rfl: 3    sod sulf-pot chloride-mag sulf (Sutab) 1.479-0.188- 0.225 gram tablet, One Sutab Kit  Take 12 pills at 6PM the day before your scope Take 12 pills at 3AM on the day of your procedure, Disp: 24 tablet, Rfl: 0    tadalafil (Cialis) 5 mg tablet, TAKE 1 TABLET (5 MG) BY MOUTH ONCE DAILY. 1 HR PRIOR TO NEEDING, Disp: 90 tablet, Rfl: 0  Prior to Admission medications    Medication Sig Start Date End Date Taking? Authorizing Provider   aluminum chloride (Drysol) 20 % external solution APPLY AS DIRECTED TO AFFECTED AREA(S) 6/29/23 6/28/24  Vanessa Hammer,    ascorbic acid (Vitamin C) 250 mg tablet Take 2 tablets (500 mg) by mouth once daily.    Historical Provider, MD   aspirin 81 mg EC tablet TAKE 1 TABLET BY MOUTH ONCE DAILY AS DIRECTED. 9/22/23 9/21/24  Benigno Johansen APRN-CNP   chlorthalidone (Hygroton) 25 mg tablet TAKE 1 TABLET BY MOUTH ONCE DAILY IN THE MORNING 6/27/23 6/26/24  Kia Faye PA-C   cholecalciferol (Vitamin D-3) 25 MCG (1000 UT) capsule Take by mouth.    Historical Provider, MD   DAILY MULTI-VITAMIN ORAL Take by mouth.    Historical  Provider, MD   Drysol Dab-O-Matic 20 % external solution  6/29/23   Historical Provider, MD   DULoxetine (Cymbalta) 30 mg DR capsule TAKE 1 CAPSULE (30 MG) BY MOUTH ONCE DAILY. 3/28/23 3/27/24  Paul Napier MD   DULoxetine (Cymbalta) 60 mg DR capsule TAKE 1 CAPSULE (60 MG) BY MOUTH ONCE DAILY. 3/28/23 3/27/24  Paul Napier MD   lamoTRIgine (LaMICtal) 200 mg tablet TAKE 1 TABLET (200 MG) BY MOUTH ONCE DAILY. 12/4/23 12/3/24  Vanessa Hammer DO   metoprolol succinate XL (Toprol-XL) 50 mg 24 hr tablet Take 1 tablet (50 mg) by mouth once daily. Do not crush or chew. 10/4/23 4/1/24  Vanessa Hammer DO   metoprolol tartrate (Lopressor) 25 mg tablet TAKE 3 TABLETS(75MG) BY MOUTH TWICE DAILY. TAKE WITH FOOD. TAKE IN THE MORNING AND BEFORE BEDTIME AS DIRECTED 3/28/23 3/27/24  Paul Napier MD   multivitamin with minerals (multivitamin-iron-folic acid) tablet Take by mouth.    Historical Provider, MD   rOPINIRole (Requip) 3 mg tablet TAKE 1 TABLET (3 MG) BY MOUTH ONCE DAILY. 8/23/23 8/22/24  Vanessa Hammer DO   rosuvastatin (Crestor) 40 mg tablet TAKE 1 TABLET BY MOUTH ONCE DAILY 3/27/23 3/26/24  Kia Faye PA-C   sod sulf-pot chloride-mag sulf (Sutab) 1.479-0.188- 0.225 gram tablet One Sutab Kit   Take 12 pills at 6PM the day before your scope  Take 12 pills at 3AM on the day of your procedure 10/17/23   Joseph Rucker MD   tadalafil (Cialis) 5 mg tablet TAKE 1 TABLET (5 MG) BY MOUTH ONCE DAILY. 1 HR PRIOR TO NEEDING 12/4/23 12/3/24  Vanessa Hammer DO     Allergies   Allergen Reactions    Fluconazole Other     increased liver enzymes     Social History     Tobacco Use    Smoking status: Never     Passive exposure: Never    Smokeless tobacco: Current     Types: Chew   Substance Use Topics    Alcohol use: Not Currently         Chemistry    Lab Results   Component Value Date/Time     10/03/2023 1015    K 4.2 10/03/2023 1015    CL 99 10/03/2023 1015    CO2 32 10/03/2023 1015     BUN 16 10/03/2023 1015    CREATININE 0.85 10/03/2023 1015    Lab Results   Component Value Date/Time    CALCIUM 9.5 10/03/2023 1015    ALKPHOS 69 10/03/2023 1015    AST 48 (H) 10/03/2023 1015    ALT 51 10/03/2023 1015    BILITOT 0.8 10/03/2023 1015          Lab Results   Component Value Date/Time    WBC 5.2 10/03/2023 1015    HGB 16.0 10/03/2023 1015    HCT 47.3 10/03/2023 1015     10/03/2023 1015     Lab Results   Component Value Date/Time    PROTIME 12.9 11/09/2022 0008    INR 1.1 11/09/2022 0008     No results found for this or any previous visit (from the past 4464 hour(s)).  No results found for this or any previous visit from the past 1095 days.      Relevant Problems   Cardiovascular   (+) CAD (coronary artery disease)   (+) Hyperlipemia   (+) Hypertension      Endocrine   (+) Class 1 obesity due to excess calories with serious comorbidity and body mass index (BMI) of 30.0 to 30.9 in adult   (+) Class 1 obesity due to excess calories with serious comorbidity and body mass index (BMI) of 31.0 to 31.9 in adult      GI   (+) Hiatal hernia      Neuro/Psych   (+) Bipolar II disorder (CMS/HCC)      Pulmonary   (+) Lung nodules       Clinical information reviewed: Pt. History reviewed. Will request cardiac clearance.    Tobacco  Allergies  Meds   Med Hx  Surg Hx   Fam Hx          NPO Detail:  No data recorded     PHYSICAL EXAM: Deferred.     Anesthesia Plan    ASA 3     MAC     intravenous induction   Anesthetic plan and risks discussed with patient.

## 2023-12-20 ENCOUNTER — ANCILLARY PROCEDURE (OUTPATIENT)
Dept: RADIOLOGY | Facility: CLINIC | Age: 58
End: 2023-12-20
Payer: COMMERCIAL

## 2023-12-20 ENCOUNTER — PRE-ADMISSION TESTING (OUTPATIENT)
Dept: PREADMISSION TESTING | Facility: HOSPITAL | Age: 58
End: 2023-12-20
Payer: COMMERCIAL

## 2023-12-20 VITALS — WEIGHT: 220.46 LBS | HEIGHT: 70 IN | BODY MASS INDEX: 31.56 KG/M2

## 2023-12-20 DIAGNOSIS — M25.552 BILATERAL HIP PAIN: ICD-10-CM

## 2023-12-20 DIAGNOSIS — M25.551 BILATERAL HIP PAIN: ICD-10-CM

## 2023-12-20 DIAGNOSIS — M25.562 ACUTE BILATERAL KNEE PAIN: ICD-10-CM

## 2023-12-20 DIAGNOSIS — M25.561 ACUTE BILATERAL KNEE PAIN: ICD-10-CM

## 2023-12-20 PROCEDURE — 73564 X-RAY EXAM KNEE 4 OR MORE: CPT | Mod: 50

## 2023-12-20 PROCEDURE — 73523 X-RAY EXAM HIPS BI 5/> VIEWS: CPT

## 2023-12-20 PROCEDURE — 73521 X-RAY EXAM HIPS BI 2 VIEWS: CPT | Mod: BILATERAL PROCEDURE | Performed by: RADIOLOGY

## 2023-12-20 PROCEDURE — 73521 X-RAY EXAM HIPS BI 2 VIEWS: CPT

## 2023-12-20 PROCEDURE — 73564 X-RAY EXAM KNEE 4 OR MORE: CPT | Mod: BILATERAL PROCEDURE | Performed by: RADIOLOGY

## 2023-12-20 ASSESSMENT — DUKE ACTIVITY SCORE INDEX (DASI)
CAN YOU HAVE SEXUAL RELATIONS: YES
CAN YOU CLIMB A FLIGHT OF STAIRS OR WALK UP A HILL: YES
CAN YOU DO LIGHT WORK AROUND THE HOUSE LIKE DUSTING OR WASHING DISHES: YES
CAN YOU WALK A BLOCK OR TWO ON LEVEL GROUND: YES
CAN YOU PARTICIPATE IN MODERATE RECREATIONAL ACTIVITIES LIKE GOLF, BOWLING, DANCING, DOUBLES TENNIS OR THROWING A BASEBALL OR FOOTBALL: YES
TOTAL_SCORE: 58.2
CAN YOU TAKE CARE OF YOURSELF (EAT, DRESS, BATHE, OR USE TOILET): YES
DASI METS SCORE: 9.9
CAN YOU WALK INDOORS, SUCH AS AROUND YOUR HOUSE: YES
CAN YOU DO HEAVY WORK AROUND THE HOUSE LIKE SCRUBBING FLOORS OR LIFTING AND MOVING HEAVY FURNITURE: YES
CAN YOU DO YARD WORK LIKE RAKING LEAVES, WEEDING OR PUSHING A MOWER: YES
CAN YOU RUN A SHORT DISTANCE: YES
CAN YOU PARTICIPATE IN STRENOUS SPORTS LIKE SWIMMING, SINGLES TENNIS, FOOTBALL, BASKETBALL, OR SKIING: YES
CAN YOU DO MODERATE WORK AROUND THE HOUSE LIKE VACUUMING, SWEEPING FLOORS OR CARRYING GROCERIES: YES

## 2023-12-20 NOTE — PREPROCEDURE INSTRUCTIONS
Medication List            Accurate as of December 20, 2023  8:59 AM. Always use your most recent med list.                ascorbic acid 250 mg tablet  Commonly known as: Vitamin C  Medication Adjustments for Surgery: Continue until night before surgery     aspirin 81 mg EC tablet  TAKE 1 TABLET BY MOUTH ONCE DAILY AS DIRECTED.  Medication Adjustments for Surgery: Continue until night before surgery     chlorthalidone 25 mg tablet  Commonly known as: [place]gro[place]  TAKE 1 TABLET BY MOUTH ONCE DAILY IN THE MORNING  Medication Adjustments for Surgery: Continue until night before surgery     cholecalciferol 25 MCG (1000 UT) capsule  Commonly known as: Vitamin D-3  Medication Adjustments for Surgery: Continue until night before surgery     DAILY MULTI-VITAMIN ORAL  Medication Adjustments for Surgery: Continue until night before surgery     * Drysol Dab-O-Matic 20 % external solution  Generic drug: aluminum chloride  APPLY AS DIRECTED TO AFFECTED AREA(S)     * Drysol Dab-O-Matic 20 % external solution  Generic drug: aluminum chloride     * DULoxetine 60 mg DR capsule  Commonly known as: Cymbalta  TAKE 1 CAPSULE (60 MG) BY MOUTH ONCE DAILY.  Medication Adjustments for Surgery: Take morning of surgery with sip of water, no other fluids     * DULoxetine 30 mg DR capsule  Commonly known as: Cymbalta  TAKE 1 CAPSULE (30 MG) BY MOUTH ONCE DAILY.  Medication Adjustments for Surgery: Continue until night before surgery     lamoTRIgine 200 mg tablet  Commonly known as: LaMICtal  TAKE 1 TABLET (200 MG) BY MOUTH ONCE DAILY.  Medication Adjustments for Surgery: Take morning of surgery with sip of water, no other fluids     metoprolol succinate XL 50 mg 24 hr tablet  Commonly known as: Toprol-XL  Take 1 tablet (50 mg) by mouth once daily. Do not crush or chew.  Medication Adjustments for Surgery: Take morning of surgery with sip of water, no other fluids     multivitamin with minerals tablet  Medication Adjustments for Surgery:  Continue until night before surgery     rOPINIRole 3 mg tablet  Commonly known as: Requip  TAKE 1 TABLET (3 MG) BY MOUTH ONCE DAILY.  Medication Adjustments for Surgery: Continue until night before surgery     rosuvastatin 40 mg tablet  Commonly known as: Crestor  TAKE 1 TABLET BY MOUTH ONCE DAILY  Medication Adjustments for Surgery: Continue until night before surgery     Sutab 1.479-0.188- 0.225 gram tablet  Generic drug: sod sulf-pot chloride-mag sulf  One Sutab Kit   Take 12 pills at 6PM the day before your scope  Take 12 pills at 3AM on the day of your procedure     tadalafil 5 mg tablet  Commonly known as: Cialis  TAKE 1 TABLET (5 MG) BY MOUTH ONCE DAILY. 1 HR PRIOR TO NEEDING           * This list has 4 medication(s) that are the same as other medications prescribed for you. Read the directions carefully, and ask your doctor or other care provider to review them with you.                  NPO Instructions:    Please follow your prep instructions exactly. Begin a clear liquid diet the day before your procedure with no solid foods until after your colonoscopy. You may have those clear liquids up to 3 hours prior to your procedure. No dairy products and nothing red or purple. Review your medication instructions, take indicated medications with just a sip of water the day of your procedure. If you are a diabetic, please do not take any diabetic medications the day of your procedure.      Additional Instructions:     SICK   If you get ill at all before your procedure please call your doctor or surgeon. We want you in the best shape that is possible. Any sickness might lead to your procedure being delayed or cancelled and changed to another day.        Please make arrangements for someone to drive you to your procedure/surgery, stay at the hospital, and drive you back home. You will not be allowed to drive after your procedure/surgery or drive the rest of your surgery day. We do recommend having someone stay  with you for 24 hours because you will be under the influence for 24 hours.     VALUABLES   It is preferred that you not bring any money, credit cards or jewelry with you. We take good care of your belongings but we would never want to lose anything of value or importance. If you wear glasses, please bring a glasses case. If you wear dentures or partials, we provide a denture cup if we need them to come out.     TIMES   The day before your procedure/surgery (Friday for Monday procedures) we will call you to let you know the time of your surgery and when we need you to be here. If you would like you can also call us between 1pm-3pm. Our number is [phone]. Please use that number for any questions or concerns you may have. Our unit operates more like a business then a 24 hour hospital. You can reach the  Monday-Friday 7am-4pm (exception is major holidays) if you want to talk to a real person or you can leave a voicemail and we will get back to you as soon as we can.     PARKING   When you come in for your procedure/surgery, we ask that you park in the Emergency Department (ED). When you come through the ED doors, we ask that you continue past until you meet a wall. Turn right and our back elevator is down the hallway on the right. Our Outpatient Surgery window is on the 2nd floor and to the left after you step off the elevator. When you check you may need to provide your photo ID and your health insurance card so please bring them with you. We will ask for your ride to go to the waiting area while we are checking you into our admitting area. Once you are ready for your procedure/surgery and are willing, your friend or family member may come sit and wait with you.

## 2023-12-21 PROCEDURE — RXMED WILLOW AMBULATORY MEDICATION CHARGE

## 2023-12-22 ENCOUNTER — OFFICE VISIT (OUTPATIENT)
Dept: ORTHOPEDIC SURGERY | Facility: CLINIC | Age: 58
End: 2023-12-22
Payer: COMMERCIAL

## 2023-12-22 ENCOUNTER — PHARMACY VISIT (OUTPATIENT)
Dept: PHARMACY | Facility: CLINIC | Age: 58
End: 2023-12-22
Payer: COMMERCIAL

## 2023-12-22 ENCOUNTER — TELEMEDICINE (OUTPATIENT)
Dept: CARDIOLOGY | Facility: CLINIC | Age: 58
End: 2023-12-22
Payer: COMMERCIAL

## 2023-12-22 DIAGNOSIS — I25.10 CAD (CORONARY ARTERY DISEASE): Primary | ICD-10-CM

## 2023-12-22 DIAGNOSIS — I10 HYPERTENSION: ICD-10-CM

## 2023-12-22 DIAGNOSIS — M25.561 ACUTE BILATERAL KNEE PAIN: Primary | ICD-10-CM

## 2023-12-22 DIAGNOSIS — M25.551 BILATERAL HIP PAIN: ICD-10-CM

## 2023-12-22 DIAGNOSIS — M25.552 BILATERAL HIP PAIN: ICD-10-CM

## 2023-12-22 DIAGNOSIS — Z95.1 S/P CABG X 2: ICD-10-CM

## 2023-12-22 DIAGNOSIS — M25.562 ACUTE BILATERAL KNEE PAIN: Primary | ICD-10-CM

## 2023-12-22 PROCEDURE — 99204 OFFICE O/P NEW MOD 45 MIN: CPT | Performed by: ORTHOPAEDIC SURGERY

## 2023-12-22 PROCEDURE — 20610 DRAIN/INJ JOINT/BURSA W/O US: CPT | Performed by: ORTHOPAEDIC SURGERY

## 2023-12-22 PROCEDURE — 3008F BODY MASS INDEX DOCD: CPT | Performed by: ORTHOPAEDIC SURGERY

## 2023-12-22 PROCEDURE — 99213 OFFICE O/P EST LOW 20 MIN: CPT | Performed by: PHYSICIAN ASSISTANT

## 2023-12-22 RX ORDER — TRIAMCINOLONE ACETONIDE 40 MG/ML
1 INJECTION, SUSPENSION INTRA-ARTICULAR; INTRAMUSCULAR
Status: COMPLETED | OUTPATIENT
Start: 2023-12-22 | End: 2023-12-22

## 2023-12-22 RX ADMIN — TRIAMCINOLONE ACETONIDE 1 ML: 40 INJECTION, SUSPENSION INTRA-ARTICULAR; INTRAMUSCULAR at 12:27

## 2023-12-22 NOTE — PROGRESS NOTES
This is a consultation from Dr. Vanessa Hammer DO for   Chief Complaint   Patient presents with    Left Hip - Pain    Right Hip - Pain    Left Knee - Pain    Right Knee - Pain       This is a 58 y.o. male who presents for evaluation of right hip pain and left knee pain.  Patient with chronic issue for him they are going on for quite a while, both been recently exacerbated.  Regarding his left knee, is a sharp pain over the medial aspect of the knee worse with walking proving with rest.  He has never had surgery on the knee.  He has never had injections.  He is tried over-the-counter anti-inflammatories which are not helping, he has not done therapy and has not used a brace or cane.  Regarding his right hip, he has pain mainly over the lateral aspect of the hip.  It hurts some when he lays on that side and rolls over on it.  No numbness or tingling no fevers or chills no shooting pain down the leg on either side    Physical Exam    There has been no interval change in this patient's past medical, surgical, medications, allergies, family history or social history since the most recent visit to a provider within our department. 14 point review of systems was performed, reviewed, and negative except for pertinent positives documented in the history of present illness.     Constitutional: well developed, well nourished male in no acute distress  Psychiatric: normal mood, appropriate affect  Eyes: sclera anicteric  HENT: normocephalic/atraumatic  CV: regular rate and rhythm   Respiratory: non labored breathing  Integumentary: no rash  Neurological: moves all extremities    Right hip exam: skin normal, no abrasions, wounds, or lacerations.  Tender over greater trochanter. negative log roll, negative angelica's test. flexion to 90 degrees without pain. no pain with flexion abduction and external rotation, no pain with flexion adduction and internal rotation. neurovascularly intact distally    Left knee exam: skin intact  no lacerations or abrations. no effusion.  Tender medial joint line. negative log roll negative patellar grind. ROM 0-120. stable to varus and valgus stress at 0 and 30 degrees. negative lachman negative posterior drawer negative angie. 5/5 ehl/fhl/gs/ta. silt s/s/sp/dp/t. 2+ dp/pt        Xrays were ordered by me, they were reviewed and independently interpreted by me today, they show no significant degenerative change in the right hip, left knee shows moderate to severe degenerative changes with medial joint space narrowing    L Inj/Asp: L knee on 12/22/2023 12:27 PM  Indications: pain and joint swelling  Details: 22 G needle, anterolateral approach  Medications: 1 mL triamcinolone acetonide 40 mg/mL    Discussion:  I discussed the conservative treatment options for knee osteoarthritis including but not limited to physical therapy, oral NSAIDS, activity and lifestyle modification, and corticosteroid injections. Pt has elected to undergo a cortisone injection today. I have explained the risk and benefits of an injection including the possibility of joint infection, bleeding, damage to cartilage, allergic reaction. Patient verbalized understanding and gave verbal consent wishes to proceed with a intra-articular cortisone injection for their knee.    Procedure:  After discussing the risk and benefits of the procedure, we proceeded with an intra-articular left knee injection. We discussed the risks and benefits and potential morbidity related to the treatment, and to the prescription medication administered in the injection    With the patient's informed verbal consent, the left knee was prepped in standard sterile fashion with Chlorhexidine. The skin was then anesthetized with ethyl chloride spray and cleaned again with Chlorhexidine. The knee was then apirated/injected with a prefilled 20-gauge syringe of 40 mg Kenalog + 4 ml Lidocaine using the lateral approach without complications.  The patient tolerated this  well and felt immediate initial relief of symptoms. A bandaid was applied and the patient ambulated out of the clinic on ther own accord without difficulty. Patient was instructed to avoid physical activity for 24-48 hours to prevent the knees from swelling and may ice the knees as tolerated. Patient should contact the office if any signs of of infection appear: redness, fever, chills, drainage, swelling or warmth to the knees.  Pt understands that the injections can be repeated no sooner than 3 months.  Procedure, treatment alternatives, risks and benefits explained, specific risks discussed. Consent was given by the patient. Immediately prior to procedure a time out was called to verify the correct patient, procedure, equipment, support staff and site/side marked as required. Patient was prepped and draped in the usual sterile fashion.             Impression/Plan: This is a 58 y.o. male with right hip greater trochanteric pain syndrome and left knee arthritis.  I discussed the risks and benefits of the various treatment options with the patient in detail, treatments for greater trochanteric pain syndrome include use of oral anti-inflammatory medications, use of a cane in the opposite hand, physical therapy, activity modification, and cortisone injection.  I had an in depth discussion with the patient regarding treatment options for arthritis and their relative risks and benefits. We reviewed surgical and nonsurgical option for treatment. Treatments include anti inflammatory medications, physical therapy, weight loss, activity modification, use of assistive devices, injection therapies. We discussed current prescriptions and risks and benefits of continuation of prescription medication as apporpriate. We discussed that arthritis is often progressive over time, an in end stage arthritis surgical interventions can be considered, including arthroplasty. All questions were answered and the patient voiced their  "understanding.  I will see him back as needed    BMI Readings from Last 1 Encounters:   12/20/23 31.63 kg/m²      Lab Results   Component Value Date    CREATININE 0.85 10/03/2023     Tobacco Use: High Risk (12/22/2023)    Patient History     Smoking Tobacco Use: Never     Smokeless Tobacco Use: Current     Passive Exposure: Never      Computed MELD 3.0 unavailable. Necessary lab results were not found in the last year.  Computed MELD-Na unavailable. Necessary lab results were not found in the last year.       Lab Results   Component Value Date    HGBA1C 5.2 06/28/2023     No results found for: \"STAPHMRSASCR\"  "

## 2023-12-23 ENCOUNTER — PREP FOR PROCEDURE (OUTPATIENT)
Dept: SURGERY | Facility: HOSPITAL | Age: 58
End: 2023-12-23
Payer: COMMERCIAL

## 2023-12-23 RX ORDER — SODIUM CHLORIDE, SODIUM LACTATE, POTASSIUM CHLORIDE, CALCIUM CHLORIDE 600; 310; 30; 20 MG/100ML; MG/100ML; MG/100ML; MG/100ML
100 INJECTION, SOLUTION INTRAVENOUS CONTINUOUS
Status: CANCELLED | OUTPATIENT
Start: 2023-12-23

## 2023-12-23 RX ORDER — ONDANSETRON HYDROCHLORIDE 2 MG/ML
4 INJECTION, SOLUTION INTRAVENOUS ONCE AS NEEDED
Status: CANCELLED | OUTPATIENT
Start: 2023-12-23

## 2023-12-23 NOTE — PROGRESS NOTES
Virtual or Telephone Consent    An interactive audio and video telecommunication system which permits real time communications between the patient (at the originating site) and provider (at the distant site) was utilized to provide this telehealth service.   Verbal consent was requested and obtained from Martín Mccormick on this date, 12/22/23 for a telehealth visit.         History of Present Illness  Martín Mccormick is a 58 y.o. year old male with PMH  of HTN, HLD, obesity, arthritis, bipolar disorder, anxiety, positive family history of CAD, who was originally seen in our clinic for elevated cardiac Ca scoring last year. Consequently, he had a cardiac cath on 10/19/22, which showed severe 2 vessel disease.   On 11/8/22, Pt underwent CABG x2 (LIMA to LAD, Left Radial to lateral circumflex with posterior pericardectomy) by Dr. Dickey.  He was last seen in June, when Dr Castellanos adjusted his BP management, and discussed the importance of heart healthy diet, exercise and weight management.   He is presenting today for virtual visit with his wife for a cardiac clearance for colonoscopy.  He denies CP, but admits to occasional SOB with exertion and palpitations. He is compliant with his medications, states that his BP is in the 140s SBP. BP today at home 136/98, . His weight is stable at ~ 100 kg. He does not check his vitals on a regular basis at home, but was advised by his PCP to keep a log for the next 2 weeks and bring the diary to his next f/u appointment.   He is scheduled for colonoscopy next week.       Past Medical History:   Diagnosis Date    Generalized anxiety disorder 01/08/2021    MILDRED (generalized anxiety disorder)    Other chest pain 11/26/2013    Atypical chest pain    Other specified anxiety disorders 09/10/2020    Anticipatory anxiety    Pain in right leg 05/01/2019    Pain of right lower extremity    Personal history of non-Hodgkin lymphomas     History of non-Hodgkin's lymphoma    Personal  history of other diseases of the circulatory system 11/19/2013    History of abnormal electrocardiography    Personal history of other mental and behavioral disorders 10/23/2019    History of anxiety    Personal history of other mental and behavioral disorders 05/22/2020    History of panic attacks    Personal history of other specified conditions 08/21/2019    History of fatigue    Social phobia, unspecified 01/08/2021    Social anxiety disorder       Social History     Tobacco Use    Smoking status: Never     Passive exposure: Never    Smokeless tobacco: Current     Types: Chew   Vaping Use    Vaping Use: Never used   Substance Use Topics    Alcohol use: Yes     Alcohol/week: 14.0 standard drinks of alcohol     Types: 14 Cans of beer per week    Drug use: Never       Family History   Problem Relation Name Age of Onset    Other (Heart problem) Mother      Alcohol abuse Father's Brother      Alcohol abuse Paternal Grandmother      Alcohol abuse Paternal Grandfather      Heart attack Paternal Grandfather      Heart attack Paternal Great-Grandfather         Review of Systems  As per HPI, all other systems reviewed and negative.    Outpatient Medications:  Current Outpatient Medications   Medication Instructions    aluminum chloride (Drysol) 20 % external solution APPLY AS DIRECTED TO AFFECTED AREA(S)    ascorbic acid (VITAMIN C) 500 mg, oral, Daily    aspirin 81 mg EC tablet TAKE 1 TABLET BY MOUTH ONCE DAILY AS DIRECTED.    chlorthalidone (Hygroton) 25 mg tablet TAKE 1 TABLET BY MOUTH ONCE DAILY IN THE MORNING    cholecalciferol (Vitamin D-3) 25 MCG (1000 UT) capsule oral    DAILY MULTI-VITAMIN ORAL oral    Drysol Dab-O-Matic 20 % external solution     DULoxetine (Cymbalta) 30 mg DR capsule TAKE 1 CAPSULE (30 MG) BY MOUTH ONCE DAILY.    DULoxetine (Cymbalta) 60 mg DR capsule TAKE 1 CAPSULE (60 MG) BY MOUTH ONCE DAILY.    lamoTRIgine (LaMICtal) 200 mg tablet TAKE 1 TABLET (200 MG) BY MOUTH ONCE DAILY.    metoprolol  succinate XL (TOPROL-XL) 50 mg, oral, Daily, Do not crush or chew.    multivitamin with minerals (multivitamin-iron-folic acid) tablet oral    rOPINIRole (Requip) 3 mg tablet TAKE 1 TABLET (3 MG) BY MOUTH ONCE DAILY.    rosuvastatin (Crestor) 40 mg tablet TAKE 1 TABLET BY MOUTH ONCE DAILY    sod sulf-pot chloride-mag sulf (Sutab) 1.479-0.188- 0.225 gram tablet One Sutab Kit <BR>Take 12 pills at 6PM the day before your scope<BR>Take 12 pills at 3AM on the day of your procedure    tadalafil (Cialis) 5 mg tablet TAKE 1 TABLET (5 MG) BY MOUTH ONCE DAILY. 1 HR PRIOR TO NEEDING         Vitals:  There were no vitals filed for this visit.    Physical Exam:  Constitutional: Well developed, NAD, awake/alert/oriented x3, pleasant, cooperative.  AOx3, NAD  Appropriate mood and behavior  Breathing unlabored,  JOHNSON  Skin: No  discolorations,    Assessment/Plan   Martín Mccormick is a 58 y.o. year old male with PMH  of HTN, HLD, obesity, arthritis, bipolar disorder, anxiety, positive family history of CAD, nonsmoker, who was originally seen in our clinic for elevated cardiac Ca scoring last year. Consequently, he had a cardiac cath on 10/19/22, which showed severe 2 vessel disease.   On 11/8/22, Pt underwent CABG x2 (LIMA to LAD, Left Radial to lateral circumflex with posterior pericardectomy) by Dr. Dickey.  He was last seen in June, when Dr Castellanos adjusted his BP management, and discussed the importance of heart healthy diet, exercise and weight management.   He is presenting today for virtual visit with his wife for a cardiac clearance for colonoscopy.  He denies CP, but admits to occasional SOB with exertion and palpitations. He is compliant with his medications, states that his BP is in the 140s SBP. BP today at home 136/98, . His weight is stable at ~ 100 kg. He does not check his vitals on a regular basis at home, but was advised by his PCP to keep a log for the next 2 weeks and bring the diary to his next f/u  appointment.   He is scheduled for colonoscopy next week.     Pt is low risk for colonoscopy, OK to proceed, do not stop ASA for the procedure.     - we discussed the importance of keeping blood pressure under control, agree with BP diary on a regular basis 2-3 hrs after taking the morning pills, may need adjustment  - TSH, lipid  panel, Hg A1C reviewed, WNL  - continue with current medication management, walking/exercise, weight control.  - f/u with Dr Castellanos in 1 year, or sooner if needed.     TINO MooreC

## 2023-12-30 ENCOUNTER — ANESTHESIA (OUTPATIENT)
Dept: GASTROENTEROLOGY | Facility: HOSPITAL | Age: 58
End: 2023-12-30
Payer: COMMERCIAL

## 2023-12-30 ENCOUNTER — HOSPITAL ENCOUNTER (OUTPATIENT)
Dept: GASTROENTEROLOGY | Facility: HOSPITAL | Age: 58
Setting detail: OUTPATIENT SURGERY
Discharge: HOME | End: 2023-12-30
Payer: COMMERCIAL

## 2023-12-30 ENCOUNTER — ANESTHESIA EVENT (OUTPATIENT)
Dept: GASTROENTEROLOGY | Facility: HOSPITAL | Age: 58
End: 2023-12-30
Payer: COMMERCIAL

## 2023-12-30 VITALS
RESPIRATION RATE: 18 BRPM | HEIGHT: 70 IN | WEIGHT: 220.46 LBS | DIASTOLIC BLOOD PRESSURE: 92 MMHG | SYSTOLIC BLOOD PRESSURE: 156 MMHG | HEART RATE: 92 BPM | OXYGEN SATURATION: 97 % | TEMPERATURE: 97.2 F | BODY MASS INDEX: 31.56 KG/M2

## 2023-12-30 DIAGNOSIS — Z12.11 SCREENING FOR COLON CANCER: ICD-10-CM

## 2023-12-30 PROCEDURE — 2500000004 HC RX 250 GENERAL PHARMACY W/ HCPCS (ALT 636 FOR OP/ED): Performed by: NURSE ANESTHETIST, CERTIFIED REGISTERED

## 2023-12-30 PROCEDURE — 7100000010 HC PHASE TWO TIME - EACH INCREMENTAL 1 MINUTE

## 2023-12-30 PROCEDURE — 2500000004 HC RX 250 GENERAL PHARMACY W/ HCPCS (ALT 636 FOR OP/ED): Performed by: SURGERY

## 2023-12-30 PROCEDURE — 7100000009 HC PHASE TWO TIME - INITIAL BASE CHARGE

## 2023-12-30 PROCEDURE — 3700000002 HC GENERAL ANESTHESIA TIME - EACH INCREMENTAL 1 MINUTE

## 2023-12-30 PROCEDURE — 88342 IMHCHEM/IMCYTCHM 1ST ANTB: CPT | Performed by: PATHOLOGY

## 2023-12-30 PROCEDURE — 88342 IMHCHEM/IMCYTCHM 1ST ANTB: CPT | Mod: TC,SUR,GENLAB | Performed by: SURGERY

## 2023-12-30 PROCEDURE — 88305 TISSUE EXAM BY PATHOLOGIST: CPT | Performed by: PATHOLOGY

## 2023-12-30 PROCEDURE — 45385 COLONOSCOPY W/LESION REMOVAL: CPT | Performed by: SURGERY

## 2023-12-30 PROCEDURE — 3700000001 HC GENERAL ANESTHESIA TIME - INITIAL BASE CHARGE

## 2023-12-30 RX ORDER — SODIUM CHLORIDE, SODIUM LACTATE, POTASSIUM CHLORIDE, CALCIUM CHLORIDE 600; 310; 30; 20 MG/100ML; MG/100ML; MG/100ML; MG/100ML
100 INJECTION, SOLUTION INTRAVENOUS CONTINUOUS
Status: DISCONTINUED | OUTPATIENT
Start: 2023-12-30 | End: 2023-12-31 | Stop reason: HOSPADM

## 2023-12-30 RX ORDER — ONDANSETRON HYDROCHLORIDE 2 MG/ML
4 INJECTION, SOLUTION INTRAVENOUS ONCE AS NEEDED
Status: DISCONTINUED | OUTPATIENT
Start: 2023-12-30 | End: 2023-12-31 | Stop reason: HOSPADM

## 2023-12-30 RX ORDER — PROPOFOL 10 MG/ML
INJECTION, EMULSION INTRAVENOUS AS NEEDED
Status: DISCONTINUED | OUTPATIENT
Start: 2023-12-30 | End: 2023-12-30

## 2023-12-30 RX ADMIN — PROPOFOL 20 MG: 10 INJECTION, EMULSION INTRAVENOUS at 10:45

## 2023-12-30 RX ADMIN — PROPOFOL 20 MG: 10 INJECTION, EMULSION INTRAVENOUS at 10:46

## 2023-12-30 RX ADMIN — PROPOFOL 10 MG: 10 INJECTION, EMULSION INTRAVENOUS at 10:55

## 2023-12-30 RX ADMIN — PROPOFOL 100 MG: 10 INJECTION, EMULSION INTRAVENOUS at 10:41

## 2023-12-30 RX ADMIN — PROPOFOL 20 MG: 10 INJECTION, EMULSION INTRAVENOUS at 10:44

## 2023-12-30 RX ADMIN — PROPOFOL 20 MG: 10 INJECTION, EMULSION INTRAVENOUS at 10:47

## 2023-12-30 RX ADMIN — PROPOFOL 100 MG: 10 INJECTION, EMULSION INTRAVENOUS at 10:39

## 2023-12-30 RX ADMIN — PROPOFOL 20 MG: 10 INJECTION, EMULSION INTRAVENOUS at 10:51

## 2023-12-30 RX ADMIN — PROPOFOL 50 MG: 10 INJECTION, EMULSION INTRAVENOUS at 10:43

## 2023-12-30 RX ADMIN — PROPOFOL 20 MG: 10 INJECTION, EMULSION INTRAVENOUS at 10:49

## 2023-12-30 RX ADMIN — PROPOFOL 20 MG: 10 INJECTION, EMULSION INTRAVENOUS at 10:53

## 2023-12-30 RX ADMIN — SODIUM CHLORIDE, POTASSIUM CHLORIDE, SODIUM LACTATE AND CALCIUM CHLORIDE 100 ML/HR: 600; 310; 30; 20 INJECTION, SOLUTION INTRAVENOUS at 09:33

## 2023-12-30 SDOH — HEALTH STABILITY: MENTAL HEALTH: CURRENT SMOKER: 0

## 2023-12-30 ASSESSMENT — PAIN - FUNCTIONAL ASSESSMENT
PAIN_FUNCTIONAL_ASSESSMENT: 0-10

## 2023-12-30 ASSESSMENT — COLUMBIA-SUICIDE SEVERITY RATING SCALE - C-SSRS
1. IN THE PAST MONTH, HAVE YOU WISHED YOU WERE DEAD OR WISHED YOU COULD GO TO SLEEP AND NOT WAKE UP?: NO
6. HAVE YOU EVER DONE ANYTHING, STARTED TO DO ANYTHING, OR PREPARED TO DO ANYTHING TO END YOUR LIFE?: NO
2. HAVE YOU ACTUALLY HAD ANY THOUGHTS OF KILLING YOURSELF?: NO

## 2023-12-30 ASSESSMENT — PAIN SCALES - GENERAL
PAINLEVEL_OUTOF10: 0 - NO PAIN
PAIN_LEVEL: 0
PAINLEVEL_OUTOF10: 0 - NO PAIN

## 2023-12-30 NOTE — H&P
History Of Present Illness  Martín Mccormick is a 58 y.o. male presenting for a low risk colonoscopy      Past Medical History  Past Medical History:   Diagnosis Date    Generalized anxiety disorder 01/08/2021    MILDRED (generalized anxiety disorder)    Hypertension     Other chest pain 11/26/2013    Atypical chest pain    Other specified anxiety disorders 09/10/2020    Anticipatory anxiety    Pain in right leg 05/01/2019    Pain of right lower extremity    Personal history of non-Hodgkin lymphomas     History of non-Hodgkin's lymphoma    Personal history of other diseases of the circulatory system 11/19/2013    History of abnormal electrocardiography    Personal history of other mental and behavioral disorders 10/23/2019    History of anxiety    Personal history of other mental and behavioral disorders 05/22/2020    History of panic attacks    Personal history of other specified conditions 08/21/2019    History of fatigue    Social phobia, unspecified 01/08/2021    Social anxiety disorder       Surgical History  Past Surgical History:   Procedure Laterality Date    CORONARY ARTERY BYPASS GRAFT      2022    OTHER SURGICAL HISTORY  07/11/2022    Tonsillectomy    OTHER SURGICAL HISTORY  07/11/2022    Finger surgical procedure        Social History  He reports that he has never smoked. He has never been exposed to tobacco smoke. His smokeless tobacco use includes chew. He reports current alcohol use of about 14.0 standard drinks of alcohol per week. He reports that he does not use drugs.    Family History  Family History   Problem Relation Name Age of Onset    Other (Heart problem) Mother      Alcohol abuse Father's Brother      Alcohol abuse Paternal Grandmother      Alcohol abuse Paternal Grandfather      Heart attack Paternal Grandfather      Heart attack Paternal Great-Grandfather          Allergies  Fluconazole    Review of Systems   All other systems reviewed and are negative.       Physical Exam  Vitals reviewed.  "  Constitutional:       Appearance: Normal appearance.   HENT:      Head: Normocephalic.   Cardiovascular:      Rate and Rhythm: Normal rate and regular rhythm.      Heart sounds: Normal heart sounds.   Pulmonary:      Effort: Pulmonary effort is normal.      Breath sounds: Normal breath sounds.   Abdominal:      General: Abdomen is flat. There is no distension.      Palpations: Abdomen is soft. There is no mass.      Tenderness: There is no abdominal tenderness. There is no guarding.      Hernia: No hernia is present.   Genitourinary:     Testes: Normal.   Musculoskeletal:         General: Normal range of motion.      Cervical back: Normal range of motion.   Skin:     General: Skin is warm.   Neurological:      General: No focal deficit present.   Psychiatric:         Mood and Affect: Mood normal.          Last Recorded Vitals  Blood pressure (!) 171/102, pulse 98, temperature 36.3 °C (97.3 °F), temperature source Temporal, resp. rate 17, height 1.778 m (5' 10\"), weight 100 kg (220 lb 7.4 oz), SpO2 95 %.    Relevant Results         Assessment/Plan   Active Problems:Low risk colon cancer   There are no active Hospital Problems.    COLONOSCOPY/BIOPSIES.  Risks include, but not limited to pain, infection, bleeding, perforation, missed lesions, aspiration, risk of cardiac, pulmonary, neurologic, locomotor, anesthetic events, incomplete colonoscopy, and other unforeseen complications including death      Joseph Rucker MD    "

## 2023-12-30 NOTE — DISCHARGE INSTRUCTIONS
Patient Instructions after a Colonoscopy      The anesthetics, sedatives or narcotics which were given to you today will be acting in your body for the next 24 hours, so you might feel a little sleepy or groggy.  This feeling should slowly wear off. Carefully read and follow the instructions.     You received sedation today:  - Do not drive or operate any machinery or power tools of any kind.   - No alcoholic beverages today, not even beer or wine.  - Do not make any important decisions or sign any legal documents.  - No over the counter medications that contain alcohol or that may cause drowsiness.  - Do not make any important decisions or sign any legal documents.    While it is common to experience mild to moderate abdominal distention, gas, or belching after your procedure, if any of these symptoms occur following discharge from the GI Lab or within one week of having your procedure, call the [facility] to be advised whether a visit to your nearest Urgent Care or Emergency Department is indicated.  Take this paper with you if you go.     - If you develop an allergic reaction to the medications that were given during your procedure such as difficulty breathing, rash, hives, severe nausea, vomiting or lightheadedness.  - If you experience chest pain, shortness of breath, severe abdominal pain, fevers and chills.  -If you develop signs and symptoms of bleeding such as blood in your spit, if your stools turn black, tarry, or bloody  - If you have not urinated within 8 hours following your procedure.  - If your IV site becomes painful, red, inflamed, or looks infected.    If you received a biopsy/polypectomy/sphincterotomy the following instructions apply below:    __ Do not use Aspirin containing products, non-steroidal medications or anti-coagulants for one week following your procedure. (Examples of these types of medications are: Advil, Arthrotec, Aleve, Coumadin, Ecotrin, Heparin, Ibuprofen,  Indocin, Motrin, Naprosyn, Nuprin, Plavix, Vioxx, and Voltarin, or their generic forms.  This list is not all-inclusive.  Check with your physician or pharmacist before resuming medications.)   __ Eat a soft diet today.  Avoid foods that are poorly digested for the next 24 hours.  These foods would include: nuts, beans, lettuce, red meats, and fried foods. Start with liquids and advance your diet as tolerated, gradually work up to eating solids.   __ Do not have a Barium Study or Enema for one week.    Your physician recommends the additional following instructions:    -You have a contact number available for emergencies. The signs and symptoms of potential delayed complications were discussed with you. You may return to normal activities tomorrow.  -Resume your previous diet.  -Continue your present medications.   -We are waiting for your pathology results.  -Your physician has recommended a repeat colonoscopy (date to be determined after pending pathology results are reviewed) for surveillance based on pathology results.  -The findings and recommendations have been discussed with you.  -The findings and recommendations were discussed with your family.  - Please see Medication Reconciliation Form for new medication/medications prescribed.       If you experience any problems or have any questions following discharge from the GI Lab, please call:        Nurse Signature                                                                        Date___________________                                                                            Patient/Responsible Party Signature                                        Date___________________

## 2023-12-30 NOTE — ANESTHESIA POSTPROCEDURE EVALUATION
Patient: Martín Mccormick    Procedure Summary       Date: 12/30/23 Room / Location: [facility]    Anesthesia Start: 1037 Anesthesia Stop: 1102    Procedure: COLONOSCOPY Diagnosis: Screening for colon cancer    Scheduled Providers: Joseph Rucker MD Responsible Provider: CLAUDIO Aragon    Anesthesia Type: MAC ASA Status: 3            Anesthesia Type: MAC    Vitals Value Taken Time   /93 12/30/23 1103   Temp 36 12/30/23 1103   Pulse 70 12/30/23 1103   Resp 14 12/30/23 1103   SpO2 98 12/30/23 1103       Anesthesia Post Evaluation    Patient location during evaluation: PACU  Patient participation: complete - patient participated  Level of consciousness: awake and alert  Pain score: 0  Pain management: adequate  Airway patency: patent  Two or more strategies used to mitigate risk of obstructive sleep apnea  Cardiovascular status: acceptable  Respiratory status: acceptable  Hydration status: acceptable  Postoperative Nausea and Vomiting: none        There were no known notable events for this encounter.

## 2023-12-30 NOTE — ANESTHESIA PREPROCEDURE EVALUATION
Patient: Martín Mccormick    Procedure Information       Date/Time: 12/30/23 1030    Scheduled providers: Joseph Rucker MD    Procedure: COLONOSCOPY    Location: [facility]            Relevant Problems   Cardiovascular   (+) CAD (coronary artery disease)   (+) Hyperlipemia   (+) Hypertension      Endocrine   (+) Class 1 obesity due to excess calories with serious comorbidity and body mass index (BMI) of 30.0 to 30.9 in adult   (+) Class 1 obesity due to excess calories with serious comorbidity and body mass index (BMI) of 31.0 to 31.9 in adult      GI   (+) Hiatal hernia      Neuro/Psych   (+) Bipolar II disorder (CMS/HCC)      Pulmonary   (+) Lung nodules       Clinical information reviewed:   Tobacco  Allergies  Meds   Med Hx  Surg Hx   Fam Hx  Soc Hx        NPO Detail:  NPO/Void Status  Carbonhydrate Drink Given Prior to Surgery? : N  Date of Last Liquid: 12/30/23  Time of Last Liquid: 0830  Date of Last Solid: 12/28/23  Time of Last Solid: 1700  Last Intake Type: Clear fluids  Time of Last Void: 0815         Physical Exam    Airway  Mallampati: II     Cardiovascular - normal exam     Dental - normal exam     Pulmonary - normal exam     Abdominal - normal exam             Anesthesia Plan    ASA 3     MAC     The patient is not a current smoker.  Patient did not smoke on day of procedure.  Education provided regarding risk of obstructive sleep apnea.  intravenous induction   Anesthetic plan and risks discussed with patient.

## 2024-01-02 ASSESSMENT — PAIN SCALES - GENERAL: PAINLEVEL_OUTOF10: 0 - NO PAIN

## 2024-01-02 NOTE — ADDENDUM NOTE
Encounter addended by: Karina Nicole RN on: 1/2/2024 2:05 PM   Actions taken: Contacts section saved, Flowsheet accepted

## 2024-01-05 ENCOUNTER — TELEPHONE (OUTPATIENT)
Dept: SURGERY | Facility: CLINIC | Age: 59
End: 2024-01-05
Payer: COMMERCIAL

## 2024-01-05 LAB
LAB AP ASR DISCLAIMER: NORMAL
LABORATORY COMMENT REPORT: NORMAL
PATH REPORT.FINAL DX SPEC: NORMAL
PATH REPORT.GROSS SPEC: NORMAL
PATH REPORT.TOTAL CANCER: NORMAL

## 2024-01-05 NOTE — TELEPHONE ENCOUNTER
----- Message from Joseph Rucker MD sent at 1/5/2024  1:25 PM EST -----  Let patient know polyp was removed and nothing to worry about. A my chart reminder will be sent for a repeat colonoscopy in 10 years

## 2024-01-09 DIAGNOSIS — I10 PRIMARY HYPERTENSION: Primary | ICD-10-CM

## 2024-01-10 ENCOUNTER — PHARMACY VISIT (OUTPATIENT)
Dept: PHARMACY | Facility: CLINIC | Age: 59
End: 2024-01-10
Payer: COMMERCIAL

## 2024-01-10 PROCEDURE — RXMED WILLOW AMBULATORY MEDICATION CHARGE

## 2024-01-10 RX ORDER — METOPROLOL SUCCINATE 100 MG/1
100 TABLET, EXTENDED RELEASE ORAL DAILY
Qty: 30 TABLET | Refills: 11 | Status: SHIPPED | OUTPATIENT
Start: 2024-01-10 | End: 2025-01-09

## 2024-01-26 ENCOUNTER — OFFICE VISIT (OUTPATIENT)
Dept: OTOLARYNGOLOGY | Facility: CLINIC | Age: 59
End: 2024-01-26
Payer: COMMERCIAL

## 2024-01-26 DIAGNOSIS — J38.3 LESION OF VOCAL CORD: ICD-10-CM

## 2024-01-26 DIAGNOSIS — R09.82 POSTNASAL DISCHARGE: Primary | ICD-10-CM

## 2024-01-26 DIAGNOSIS — K21.9 GASTROESOPHAGEAL REFLUX DISEASE, UNSPECIFIED WHETHER ESOPHAGITIS PRESENT: ICD-10-CM

## 2024-01-26 DIAGNOSIS — J34.89 NASAL CRUSTING: ICD-10-CM

## 2024-01-26 PROCEDURE — RXMED WILLOW AMBULATORY MEDICATION CHARGE

## 2024-01-26 PROCEDURE — 99204 OFFICE O/P NEW MOD 45 MIN: CPT | Performed by: OTOLARYNGOLOGY

## 2024-01-26 PROCEDURE — 3008F BODY MASS INDEX DOCD: CPT | Performed by: OTOLARYNGOLOGY

## 2024-01-26 RX ORDER — PANTOPRAZOLE SODIUM 40 MG/1
40 TABLET, DELAYED RELEASE ORAL
Qty: 30 TABLET | Refills: 11 | Status: SHIPPED | OUTPATIENT
Start: 2024-01-26 | End: 2024-04-04 | Stop reason: ALTCHOICE

## 2024-01-26 RX ORDER — AMOXICILLIN AND CLAVULANATE POTASSIUM 875; 125 MG/1; MG/1
1 TABLET, FILM COATED ORAL 2 TIMES DAILY
Qty: 14 TABLET | Refills: 0 | Status: SHIPPED | OUTPATIENT
Start: 2024-01-26 | End: 2024-02-07

## 2024-01-26 RX ORDER — MUPIROCIN 20 MG/G
OINTMENT TOPICAL 2 TIMES DAILY
Qty: 22 G | Refills: 3 | Status: SHIPPED | OUTPATIENT
Start: 2024-01-26 | End: 2024-02-14

## 2024-01-26 RX ORDER — SOD CHLOR,BICARB/SQUEEZ BOTTLE
1 PACKET, WITH RINSE DEVICE NASAL 2 TIMES DAILY
Qty: 60 EACH | Refills: 0 | Status: SHIPPED | OUTPATIENT
Start: 2024-01-26 | End: 2024-03-01 | Stop reason: HOSPADM

## 2024-01-31 ENCOUNTER — OFFICE VISIT (OUTPATIENT)
Dept: OTOLARYNGOLOGY | Facility: HOSPITAL | Age: 59
End: 2024-01-31
Payer: COMMERCIAL

## 2024-01-31 ENCOUNTER — PHARMACY VISIT (OUTPATIENT)
Dept: PHARMACY | Facility: CLINIC | Age: 59
End: 2024-01-31
Payer: COMMERCIAL

## 2024-01-31 VITALS — WEIGHT: 233.5 LBS | BODY MASS INDEX: 33.43 KG/M2 | HEIGHT: 70 IN

## 2024-01-31 DIAGNOSIS — R09.82 POSTNASAL DISCHARGE: ICD-10-CM

## 2024-01-31 DIAGNOSIS — Z95.1 S/P CABG X 2: ICD-10-CM

## 2024-01-31 DIAGNOSIS — I25.10 CORONARY ARTERY DISEASE INVOLVING NATIVE CORONARY ARTERY OF NATIVE HEART WITHOUT ANGINA PECTORIS: ICD-10-CM

## 2024-01-31 DIAGNOSIS — J38.3 LESION OF VOCAL FOLD: Primary | ICD-10-CM

## 2024-01-31 DIAGNOSIS — C85.90 NON-HODGKIN'S LYMPHOMA, UNSPECIFIED BODY REGION, UNSPECIFIED NON-HODGKIN LYMPHOMA TYPE (MULTI): ICD-10-CM

## 2024-01-31 PROCEDURE — 99205 OFFICE O/P NEW HI 60 MIN: CPT | Performed by: OTOLARYNGOLOGY

## 2024-01-31 PROCEDURE — 31575 DIAGNOSTIC LARYNGOSCOPY: CPT | Performed by: OTOLARYNGOLOGY

## 2024-01-31 PROCEDURE — 3008F BODY MASS INDEX DOCD: CPT | Performed by: OTOLARYNGOLOGY

## 2024-01-31 PROCEDURE — RXMED WILLOW AMBULATORY MEDICATION CHARGE

## 2024-01-31 PROCEDURE — 99215 OFFICE O/P EST HI 40 MIN: CPT | Performed by: OTOLARYNGOLOGY

## 2024-01-31 ASSESSMENT — PATIENT HEALTH QUESTIONNAIRE - PHQ9
1. LITTLE INTEREST OR PLEASURE IN DOING THINGS: NOT AT ALL
2. FEELING DOWN, DEPRESSED OR HOPELESS: NOT AT ALL
SUM OF ALL RESPONSES TO PHQ9 QUESTIONS 1 & 2: 0

## 2024-01-31 NOTE — LETTER
2024     Chelsea Montoya MD  [address]  [address]  [facility] [address]    Patient: Martín Mccormick   YOB: 1965   Date of Visit: 2024       Dear Dr. Chelsea Montoya MD:    Thank you for referring Martín Mccormick to me for evaluation. Below are my notes for this consultation.  If you have questions, please do not hesitate to call me. I look forward to following your patient along with you.       Sincerely,     Marina Weinstein MD      CC: No Recipients  ______________________________________________________________________________________    Patient: Martín Mccormick   MRN: [id number] YOB: 1965   Sex: male Age: 58 y.o.  Date of Service: 2024       ASSESSMENT AND PLAN  I discussed the findings with Martín Mccormick and have recommended the followin. Incidental left vocal fold lesion, asymptomatic, history of intubation, non-Hodgkin's lymphoma. 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 However patient reports he has significant anxiety about cancer given his lymphoma history. Discussed in-office or endosuite biopsy vs OR and he would like to proceed with OR. Understands that the granuloma may come back.   - Schedule for microdirectlaryngogoscopy, biopsy, possible KTP laser ablation, steroid injection. Risks, benefits, and alternatives discussed with the patient, including but not limited to bleeding, infection, pain, need for repeat procedure, no improvement in symptoms, dental damage, injury to surrounding structures, and unanticipated mortality. The patient expressed understanding and agrees to proceed.       CHIEF COMPLAINT  Chief Complaint   Patient presents with   • Follow-up     Left vocal cord lesion       HISTORY OF PRESENT ILLNESS  Martín Mccormick is a 58 y.o. male referred by Chelsea Briseno MD for evaluation of a left vocal fold lesion.  The patient saw Dr. Montoya on 24 for post nasal drip and  was found to have the lesion.     He denies dysphonia, dysphagia, or dyspnea. He had an open heart surgery about 1 year ago for which he was intubated for the procedure. No hemoptysis or weight loss.    PMH: HTN, CAD s/p CABG, non hodgkins lymphoma  SH: never smoker, current drinking, 14 drinks/week        ADDITIONAL HISTORY  Past Medical History  He has a past medical history of Generalized anxiety disorder (01/08/2021), Hypertension, Other chest pain (11/26/2013), Other specified anxiety disorders (09/10/2020), Pain in right leg (05/01/2019), Personal history of non-Hodgkin lymphomas, Personal history of other diseases of the circulatory system (11/19/2013), Personal history of other mental and behavioral disorders (10/23/2019), Personal history of other mental and behavioral disorders (05/22/2020), Personal history of other specified conditions (08/21/2019), and Social phobia, unspecified (01/08/2021). Surgical History  He has a past surgical history that includes Other surgical history (07/11/2022); Other surgical history (07/11/2022); and Coronary artery bypass graft.   Social History  He reports that he has never smoked. He has never been exposed to tobacco smoke. His smokeless tobacco use includes chew and snuff. He reports current alcohol use of about 14.0 standard drinks of alcohol per week. He reports that he does not use drugs. Allergies  Fluconazole     Family History  Family History   Problem Relation Name Age of Onset   • Other (Heart problem) Mother     • Alcohol abuse Father's Brother     • Alcohol abuse Paternal Grandmother     • Alcohol abuse Paternal Grandfather     • Heart attack Paternal Grandfather     • Heart attack Paternal Great-Grandfather          REVIEW OF SYSTEMS  All 10 systems were reviewed and negative except for above.      PHYSICAL EXAM  ENT Physical Exam   GENERAL: Well-nourished and developed, alert and appropriate, no distress, voice [id number]  RESPIRATORY: Breathing quietly, no  "stridor  HEAD: Normocephalic atraumatic  FACE: Symmetric, no masses or lesions  EYES:  Pupils reactive, sclera clear, external ocular muscles intact, no nystagmus.    EARS:  Pinnae normal. External auditory canals clear and tympanic membranes intact.  NOSE:  No anterior lesions, masses or polyps.  ORAL CAVITY/OROPHARYNX:  Buccal mucosa is moist without lesions or masses, tongue midline and palate elevates symmetrically. Tongue mobility intact.  NECK:  Soft. There is no lymphadenopathy or thyromegaly.    NEUROLOGIC:  Cranial nerves II-XII grossly intact.       Last Recorded Vitals  Height 1.778 m (5' 10\"), weight 106 kg (233 lb 8 oz).    RESULTS    Patient Reported Outcome Measures  N/A    Laboratory, Radiology, and Pathology  I personally reviewed the following results, with the following interpretation:   N/A      PROCEDURES  Laryngoscopy    Date/Time: 2/5/2024 8:16 AM    Performed by: Marina Weinstein MD  Authorized by: Marina Weinstein MD       Flexible Fiberoptic Laryngoscopy     Patient failed a mirror exam due to limitations of equipment and the need for laryngoscopy to assess laryngeal anatomy and function    PREOPERATIVE DIAGNOSIS: Vocal fold lesion    POSTOPERATIVE DIAGNOSIS: Same    PROCEDURE: Transnasal videolaryngoscopy    ANESTHESIA:  Topical    COMPLICATIONS:  None    SPECIMENS:  None    PROCEDURE IN DETAIL:  The patient was brought into the endoscopy suite, placed in the upright position.  The nasal cavity was topically decongested anesthetized.  The distal chip video laryngoscope was passed through the nasal cavity.  The nasal cavity and nasopharynx were within normal limits except noted below. The following findings on laryngoscopy were noted:         Tongue Base: no masses or lesions          Left vocal fold mobility: mobile         Right vocal fold mobility: mobile         Glottal closure: complete         Laryngeal muscle tension: minimal         Symmetry: symmetric         Vocal fold free edge: no " masses or lesions along the membranous vocal folds but there is a lesion on the left vocal process most consistent with a granuloma. NBI is also reassuring         Other abnormalities: per above    The patient tolerated the procedure well.          ----------------------------------------------------------------------  Marina Weinstein MD, MAEd    [facility]  Department of Otolaryngology - Head and Neck Surgery  [facility]    The total time I spent in care of this patient today (excluding time spent on other billable services) is as follows:    Time Spent  Prep time on day of patient encounter: 10 minutes  Time spent directly with patient, family or caregiver: 25 minutes  Additional Time Spent on Patient Care Activities: 5 minutes  Documentation Time: 5 minutes  Other Time Spent: 0 minutes  Total: 45 minutes

## 2024-01-31 NOTE — PROGRESS NOTES
Patient: Martín Mccormick   MRN: [id number] YOB: 1965   Sex: male Age: 58 y.o.  Date of Service: 2024       ASSESSMENT AND PLAN  I discussed the findings with Martín Mccormick and have recommended the followin. Incidental left vocal fold lesion, asymptomatic, history of intubation, non-Hodgkin's lymphoma. 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 However patient reports he has significant anxiety about cancer given his lymphoma history. Discussed in-office or endosuite biopsy vs OR and he would like to proceed with OR. Understands that the granuloma may come back.   - Schedule for microdirectlaryngogoscopy, biopsy, possible KTP laser ablation, steroid injection. Risks, benefits, and alternatives discussed with the patient, including but not limited to bleeding, infection, pain, need for repeat procedure, no improvement in symptoms, dental damage, injury to surrounding structures, and unanticipated mortality. The patient expressed understanding and agrees to proceed.       CHIEF COMPLAINT  Chief Complaint   Patient presents with    Follow-up     Left vocal cord lesion       HISTORY OF PRESENT ILLNESS  Martín Mccormick is a 58 y.o. male referred by Chelsea Briseno MD for evaluation of a left vocal fold lesion.  The patient saw Dr. Montoya on 24 for post nasal drip and was found to have the lesion.     He denies dysphonia, dysphagia, or dyspnea. He had an open heart surgery about 1 year ago for which he was intubated for the procedure. No hemoptysis or weight loss.    PMH: HTN, CAD s/p CABG, non hodgkins lymphoma  SH: never smoker, current drinking, 14 drinks/week        ADDITIONAL HISTORY  Past Medical History  He has a past medical history of Generalized anxiety disorder (2021), Hypertension, Other chest pain (2013), Other specified anxiety disorders (09/10/2020), Pain in right leg (2019), Personal history of  non-Hodgkin lymphomas, Personal history of other diseases of the circulatory system (11/19/2013), Personal history of other mental and behavioral disorders (10/23/2019), Personal history of other mental and behavioral disorders (05/22/2020), Personal history of other specified conditions (08/21/2019), and Social phobia, unspecified (01/08/2021). Surgical History  He has a past surgical history that includes Other surgical history (07/11/2022); Other surgical history (07/11/2022); and Coronary artery bypass graft.   Social History  He reports that he has never smoked. He has never been exposed to tobacco smoke. His smokeless tobacco use includes chew and snuff. He reports current alcohol use of about 14.0 standard drinks of alcohol per week. He reports that he does not use drugs. Allergies  Fluconazole     Family History  Family History   Problem Relation Name Age of Onset    Other (Heart problem) Mother      Alcohol abuse Father's Brother      Alcohol abuse Paternal Grandmother      Alcohol abuse Paternal Grandfather      Heart attack Paternal Grandfather      Heart attack Paternal Great-Grandfather          REVIEW OF SYSTEMS  All 10 systems were reviewed and negative except for above.      PHYSICAL EXAM  ENT Physical Exam   GENERAL: Well-nourished and developed, alert and appropriate, no distress, voice [id number]  RESPIRATORY: Breathing quietly, no stridor  HEAD: Normocephalic atraumatic  FACE: Symmetric, no masses or lesions  EYES:  Pupils reactive, sclera clear, external ocular muscles intact, no nystagmus.    EARS:  Pinnae normal. External auditory canals clear and tympanic membranes intact.  NOSE:  No anterior lesions, masses or polyps.  ORAL CAVITY/OROPHARYNX:  Buccal mucosa is moist without lesions or masses, tongue midline and palate elevates symmetrically. Tongue mobility intact.  NECK:  Soft. There is no lymphadenopathy or thyromegaly.    NEUROLOGIC:  Cranial nerves II-XII grossly intact.       Last  "Recorded Vitals  Height 1.778 m (5' 10\"), weight 106 kg (233 lb 8 oz).    RESULTS    Patient Reported Outcome Measures  N/A    Laboratory, Radiology, and Pathology  I personally reviewed the following results, with the following interpretation:   N/A      PROCEDURES  Laryngoscopy    Date/Time: 2/5/2024 8:16 AM    Performed by: Marina Weinstein MD  Authorized by: Marina Weinstein MD       Flexible Fiberoptic Laryngoscopy     Patient failed a mirror exam due to limitations of equipment and the need for laryngoscopy to assess laryngeal anatomy and function    PREOPERATIVE DIAGNOSIS: Vocal fold lesion    POSTOPERATIVE DIAGNOSIS: Same    PROCEDURE: Transnasal videolaryngoscopy    ANESTHESIA:  Topical    COMPLICATIONS:  None    SPECIMENS:  None    PROCEDURE IN DETAIL:  The patient was brought into the endoscopy suite, placed in the upright position.  The nasal cavity was topically decongested anesthetized.  The distal chip video laryngoscope was passed through the nasal cavity.  The nasal cavity and nasopharynx were within normal limits except noted below. The following findings on laryngoscopy were noted:         Tongue Base: no masses or lesions          Left vocal fold mobility: mobile         Right vocal fold mobility: mobile         Glottal closure: complete         Laryngeal muscle tension: minimal         Symmetry: symmetric         Vocal fold free edge: no masses or lesions along the membranous vocal folds but there is a lesion on the left vocal process most consistent with a granuloma. NBI is also reassuring         Other abnormalities: per above    The patient tolerated the procedure well.          ----------------------------------------------------------------------  Marina Weinstein MD, MAEd    [facility]  Department of Otolaryngology - Head and Neck Surgery  [facility]    The total time I spent in care of this patient today (excluding time " spent on other billable services) is as follows:    Time Spent  Prep time on day of patient encounter: 10 minutes  Time spent directly with patient, family or caregiver: 25 minutes  Additional Time Spent on Patient Care Activities: 5 minutes  Documentation Time: 5 minutes  Other Time Spent: 0 minutes  Total: 45 minutes

## 2024-02-01 ENCOUNTER — PHARMACY VISIT (OUTPATIENT)
Dept: PHARMACY | Facility: CLINIC | Age: 59
End: 2024-02-01
Payer: COMMERCIAL

## 2024-02-01 PROCEDURE — RXMED WILLOW AMBULATORY MEDICATION CHARGE

## 2024-02-05 PROBLEM — J38.3 LESION OF VOCAL FOLD: Status: ACTIVE | Noted: 2024-01-31

## 2024-02-05 RX ORDER — SODIUM CHLORIDE, SODIUM LACTATE, POTASSIUM CHLORIDE, CALCIUM CHLORIDE 600; 310; 30; 20 MG/100ML; MG/100ML; MG/100ML; MG/100ML
100 INJECTION, SOLUTION INTRAVENOUS CONTINUOUS
Status: CANCELLED | OUTPATIENT
Start: 2024-02-05

## 2024-02-14 ENCOUNTER — APPOINTMENT (OUTPATIENT)
Dept: BEHAVIORAL HEALTH | Facility: CLINIC | Age: 59
End: 2024-02-14
Payer: COMMERCIAL

## 2024-02-14 ENCOUNTER — TELEPHONE (OUTPATIENT)
Dept: PREADMISSION TESTING | Facility: HOSPITAL | Age: 59
End: 2024-02-14

## 2024-02-20 ENCOUNTER — LAB (OUTPATIENT)
Dept: LAB | Facility: LAB | Age: 59
End: 2024-02-20
Payer: COMMERCIAL

## 2024-02-20 ENCOUNTER — PHARMACY VISIT (OUTPATIENT)
Dept: PHARMACY | Facility: CLINIC | Age: 59
End: 2024-02-20
Payer: COMMERCIAL

## 2024-02-20 ENCOUNTER — PRE-ADMISSION TESTING (OUTPATIENT)
Dept: PREADMISSION TESTING | Facility: HOSPITAL | Age: 59
End: 2024-02-20
Payer: COMMERCIAL

## 2024-02-20 VITALS
HEIGHT: 70 IN | OXYGEN SATURATION: 97 % | WEIGHT: 227.29 LBS | SYSTOLIC BLOOD PRESSURE: 119 MMHG | DIASTOLIC BLOOD PRESSURE: 86 MMHG | TEMPERATURE: 97.9 F | RESPIRATION RATE: 18 BRPM | HEART RATE: 86 BPM | BODY MASS INDEX: 32.54 KG/M2

## 2024-02-20 DIAGNOSIS — R09.82 POSTNASAL DISCHARGE: ICD-10-CM

## 2024-02-20 DIAGNOSIS — I25.10 CORONARY ARTERY DISEASE: Primary | ICD-10-CM

## 2024-02-20 DIAGNOSIS — G25.81 RESTLESS LEGS SYNDROME: ICD-10-CM

## 2024-02-20 DIAGNOSIS — J38.3 LESION OF VOCAL FOLD: ICD-10-CM

## 2024-02-20 DIAGNOSIS — C85.90 NON-HODGKIN'S LYMPHOMA, UNSPECIFIED BODY REGION, UNSPECIFIED NON-HODGKIN LYMPHOMA TYPE (MULTI): ICD-10-CM

## 2024-02-20 DIAGNOSIS — F31.81 BIPOLAR II DISORDER (MULTI): ICD-10-CM

## 2024-02-20 DIAGNOSIS — I25.10 CORONARY ARTERY DISEASE INVOLVING NATIVE CORONARY ARTERY OF NATIVE HEART WITHOUT ANGINA PECTORIS: ICD-10-CM

## 2024-02-20 DIAGNOSIS — Z95.1 S/P CABG X 2: ICD-10-CM

## 2024-02-20 DIAGNOSIS — Z01.818 PREOP EXAMINATION: Primary | ICD-10-CM

## 2024-02-20 LAB
ANION GAP SERPL CALC-SCNC: 15 MMOL/L (ref 10–20)
BUN SERPL-MCNC: 17 MG/DL (ref 6–23)
CALCIUM SERPL-MCNC: 8.8 MG/DL (ref 8.6–10.3)
CHLORIDE SERPL-SCNC: 98 MMOL/L (ref 98–107)
CO2 SERPL-SCNC: 29 MMOL/L (ref 21–32)
CREAT SERPL-MCNC: 0.83 MG/DL (ref 0.5–1.3)
EGFRCR SERPLBLD CKD-EPI 2021: >90 ML/MIN/1.73M*2
ERYTHROCYTE [DISTWIDTH] IN BLOOD BY AUTOMATED COUNT: 12.6 % (ref 11.5–14.5)
GLUCOSE SERPL-MCNC: 98 MG/DL (ref 74–99)
HCT VFR BLD AUTO: 45.3 % (ref 41–52)
HGB BLD-MCNC: 15.6 G/DL (ref 13.5–17.5)
MCH RBC QN AUTO: 30.8 PG (ref 26–34)
MCHC RBC AUTO-ENTMCNC: 34.4 G/DL (ref 32–36)
MCV RBC AUTO: 89 FL (ref 80–100)
NRBC BLD-RTO: 0 /100 WBCS (ref 0–0)
PLATELET # BLD AUTO: 239 X10*3/UL (ref 150–450)
POTASSIUM SERPL-SCNC: 3.5 MMOL/L (ref 3.5–5.3)
RBC # BLD AUTO: 5.07 X10*6/UL (ref 4.5–5.9)
SODIUM SERPL-SCNC: 138 MMOL/L (ref 136–145)
WBC # BLD AUTO: 5.6 X10*3/UL (ref 4.4–11.3)

## 2024-02-20 PROCEDURE — 80048 BASIC METABOLIC PNL TOTAL CA: CPT

## 2024-02-20 PROCEDURE — 93005 ELECTROCARDIOGRAM TRACING: CPT | Performed by: NURSE PRACTITIONER

## 2024-02-20 PROCEDURE — RXMED WILLOW AMBULATORY MEDICATION CHARGE

## 2024-02-20 PROCEDURE — 99203 OFFICE O/P NEW LOW 30 MIN: CPT | Performed by: NURSE PRACTITIONER

## 2024-02-20 PROCEDURE — 36415 COLL VENOUS BLD VENIPUNCTURE: CPT

## 2024-02-20 PROCEDURE — 85027 COMPLETE CBC AUTOMATED: CPT

## 2024-02-20 RX ORDER — DULOXETIN HYDROCHLORIDE 30 MG/1
30 CAPSULE, DELAYED RELEASE ORAL DAILY
Qty: 90 CAPSULE | Refills: 3 | Status: SHIPPED | OUTPATIENT
Start: 2024-02-20 | End: 2025-02-19

## 2024-02-20 RX ORDER — ROSUVASTATIN CALCIUM 40 MG/1
40 TABLET, COATED ORAL DAILY
Qty: 90 TABLET | Refills: 3 | Status: SHIPPED | OUTPATIENT
Start: 2024-02-20 | End: 2025-02-19

## 2024-02-20 RX ORDER — TRIPROLIDINE/PSEUDOEPHEDRINE 2.5MG-60MG
200 TABLET ORAL EVERY 6 HOURS
COMMUNITY

## 2024-02-20 RX ORDER — ROPINIROLE 3 MG/1
3 TABLET, FILM COATED ORAL DAILY
Qty: 90 TABLET | Refills: 1 | Status: SHIPPED | OUTPATIENT
Start: 2024-02-20 | End: 2024-05-01

## 2024-02-20 RX ORDER — DEXTROMETHORPHAN HYDROBROMIDE, GUAIFENESIN 5; 100 MG/5ML; MG/5ML
650 LIQUID ORAL EVERY 8 HOURS PRN
COMMUNITY

## 2024-02-20 ASSESSMENT — ENCOUNTER SYMPTOMS
GASTROINTESTINAL NEGATIVE: 1
CONSTITUTIONAL NEGATIVE: 1
EYES NEGATIVE: 1
NECK NEGATIVE: 1
RESPIRATORY NEGATIVE: 1
NEUROLOGICAL NEGATIVE: 1
ENDOCRINE NEGATIVE: 1
BRUISES/BLEEDS EASILY: 1
MUSCULOSKELETAL NEGATIVE: 1

## 2024-02-20 NOTE — CPM/PAT H&P
[facility]/PAT Evaluation       Name: Martín Mccormick (Martín Mccormick)  /Age: 1965/58 y.o.     In-Person         HPI        Date of Consult: 24    Referring Provider: Dr. Weinstein    Microdirect laryngoscopy; biopsy vocal fold lesion; steroid injection, KTP laser, 3/1/24    Martín Mccormick is a  58 year-old female who presents to the [facility] for perioperative risk assessment prior to surgery.    Patient presents with a primary diagnosis of vocal fold lesion. Incidental left vocal fold lesion, asymptomatic, history of intubation, non-Hodgkin's lymphoma. 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 However patient reports he has significant anxiety about cancer given his lymphoma history     This note was created in part upon personal review of patient's medical records.      Patient is scheduled to have Microdirect laryngoscopy; biopsy vocal fold lesion; steroid injection, KTP laser,      Pt denies any past history of anesthetic complications such as PONV, awareness, prolonged sedation, dental damage, aspiration, cardiac arrest, difficult intubation, difficult I.V. access or unexpected hospital admissions.  NO malignant hyperthermia and or pseudocholinesterase deficiency.  history of blood transfusions     STOP BANG 3    The patient is not a [employer] and will accept blood and blood products if medically indicated.   Type and screen not sent.     Past Medical History:   Diagnosis Date    Bipolar II disorder (CMS/HCC)     Cardiology follow-up encounter 2022    Palmer Castellanos DO    Coronary artery disease     s/p CABG X2    Erectile dysfunction     Generalized anxiety disorder     Hyperlipidemia     Hypertension     Lesion of vocal fold     Lung nodules     Obesity     Other chest pain     Personal history of non-Hodgkin lymphomas     Restless legs syndrome     Social phobia, unspecified     Social anxiety disorder    Vitamin D deficiency         Past Surgical History:   Procedure Laterality Date    CORONARY ARTERY BYPASS GRAFT  10/2022    OTHER SURGICAL HISTORY      Finger surgical procedure    TONSILLECTOMY         Social History     Socioeconomic History    Marital status:      Spouse name: Not on file    Number of children: Not on file    Years of education: Not on file    Highest education level: Not on file   Occupational History    Not on file   Tobacco Use    Smoking status: Never     Passive exposure: Never    Smokeless tobacco: Current     Types: Chew, Snuff   Vaping Use    Vaping Use: Never used   Substance and Sexual Activity    Alcohol use: Yes     Alcohol/week: 14.0 standard drinks of alcohol     Types: 14 Cans of beer per week    Drug use: Never    Sexual activity: Yes   Other Topics Concern    Not on file   Social History Narrative    Not on file     Social Determinants of Health     Financial Resource Strain: Not on file   Food Insecurity: Not on file   Transportation Needs: Not on file   Physical Activity: Not on file   Stress: Not on file   Social Connections: Not on file   Intimate Partner Violence: Not on file   Housing Stability: Not on file        Family History   Problem Relation Name Age of Onset    Other (Heart problem) Mother      Alcohol abuse Father's Brother      Alcohol abuse Paternal Grandmother      Alcohol abuse Paternal Grandfather      Heart attack Paternal Grandfather      Heart attack Paternal Great-Grandfather         Allergies   Allergen Reactions    Fluconazole Other     increased liver enzymes       Current Outpatient Medications:     acetaminophen (Tylenol 8 HOUR) 650 mg ER tablet, Take 1 tablet (650 mg) by mouth every 8 hours if needed for mild pain (1 - 3). Do not crush, chew, or split., Disp: , Rfl:     ascorbic acid (Vitamin C) 250 mg tablet, Take 2 tablets (500 mg) by mouth once daily., Disp: , Rfl:     aspirin 81 mg EC tablet, TAKE 1 TABLET BY MOUTH ONCE DAILY AS DIRECTED., Disp: 30 tablet, Rfl:  11    chlorthalidone (Hygroton) 25 mg tablet, TAKE 1 TABLET BY MOUTH ONCE DAILY IN THE MORNING, Disp: 30 tablet, Rfl: 11    cholecalciferol (Vitamin D-3) 25 MCG (1000 UT) capsule, Take by mouth., Disp: , Rfl:     DAILY MULTI-VITAMIN ORAL, Take by mouth., Disp: , Rfl:     DULoxetine (Cymbalta) 30 mg DR capsule, TAKE 1 CAPSULE (30 MG) BY MOUTH ONCE DAILY., Disp: 90 capsule, Rfl: 3    DULoxetine (Cymbalta) 60 mg DR capsule, TAKE 1 CAPSULE (60 MG) BY MOUTH ONCE DAILY., Disp: 90 capsule, Rfl: 3    ibuprofen 100 mg/5 mL suspension, Take 10 mL (200 mg) by mouth every 6 hours., Disp: , Rfl:     lamoTRIgine (LaMICtal) 200 mg tablet, TAKE 1 TABLET (200 MG) BY MOUTH ONCE DAILY., Disp: 90 tablet, Rfl: 0    metoprolol succinate XL (Toprol XL) 100 mg 24 hr tablet, Take 1 tablet (100 mg) by mouth once daily. Do not crush or chew., Disp: 30 tablet, Rfl: 11    rOPINIRole (Requip) 3 mg tablet, TAKE 1 TABLET (3 MG) BY MOUTH ONCE DAILY., Disp: 90 tablet, Rfl: 1    rosuvastatin (Crestor) 40 mg tablet, TAKE 1 TABLET BY MOUTH ONCE DAILY, Disp: 90 tablet, Rfl: 3    sod chloride-sodium bicarb (Neilmed Sinus Rinse Complete) nasal rinse, Administer 1 packet into affected nostril(s) 2 times a day. Mix with distilled warm water, follow instructions on kit., Disp: 60 each, Rfl: 0    tadalafil (Cialis) 5 mg tablet, TAKE 1 TABLET (5 MG) BY MOUTH ONCE DAILY. 1 HR PRIOR TO NEEDING, Disp: 90 tablet, Rfl: 0    pantoprazole (Protonix) 40 mg EC tablet, Take 1 tablet (40 mg) by mouth once daily in the morning. Take before meals. Do not crush, chew, or split. (Patient not taking: Reported on 2/20/2024), Disp: 30 tablet, Rfl: 11        PAT ROS:   Constitutional:   neg    Neuro/Psych:   neg    Eyes:   neg    Ears:   neg    Nose:   neg    Mouth:   neg    Throat:   neg    Neck:   neg    Cardio:    FUNCTIONAL 4 METS. DENIES SOB WALKING FROM [facility] TO PARKING LOT  Respiratory:   neg    Endocrine:   neg    GI:   neg    :   neg    Musculoskeletal:   neg   "  Hematologic:    DUE TO TAKING ASA   bruises/bleeds easily  Skin:  neg        Physical Exam  Vitals reviewed. Physical exam within normal limits.  (WEARS GLASSES)         PAT AIRWAY:   Airway:     Mallampati::  III    Neck ROM::  Full  normal            Heart Rate:  [86]   Temp:  [36.6 °C (97.9 °F)]   Resp:  [18]   BP: (119)/(86)   Height:  [177.8 cm (5' 10\")]   Weight:  [103 kg (227 lb 4.7 oz)]   SpO2:  [97 %]      EKG IN PAT 2/20/24:  NSR  PROLONGED QT  ABNORMAL EKG  HR 85 BPM    Lab Results   Component Value Date    GLUCOSE 98 02/20/2024    CALCIUM 8.8 02/20/2024     02/20/2024    K 3.5 02/20/2024    CO2 29 02/20/2024    CL 98 02/20/2024    BUN 17 02/20/2024    CREATININE 0.83 02/20/2024      Lab Results   Component Value Date    WBC 5.6 02/20/2024    HGB 15.6 02/20/2024    HCT 45.3 02/20/2024    MCV 89 02/20/2024     02/20/2024        Assessment and Plan:         Patient is a 58 year-old male scheduled for a with Dr. Weinstein on 3/1/24.  Patient has no active cardiac symptoms.   Patient denies any chest pain, tightness, heaviness, pressure, radiating pain, palpitations, irregular heartbeats, lightheadedness, cough, congestion, shortness of breath, THOMAS, PND, near syncope, weight loss or gain.     RCRI  1  , 6 % Risk of MACE    Cardiology:  -- History of CAD s/p CABG x2. Received cardiac clearance from Kia JOHNSON who states \"this patient's cardiac status is optimized for the proposed procedure and no further cardiac testing is required. Low dose ASA must continue uninterrupted throughout the perioperative period\"    Hematology:  Patient instructed to ambulate as soon as possible postoperatively to decrease thromboembolic risk.   Initiate mechanical DVT prophylaxis as soon as possible and initiate chemical prophylaxis when deemed safe from a bleeding standpoint post surgery.     Caprini: 8    LABS PER DR. WEINSTEIN    Risk assessment complete.  Patient is scheduled for a low surgical risk procedure.  "       Preoperative medication instructions were provided and reviewed with the patient.  Any additional testing or evaluation was explained to the patient.  Nothing by mouth instructions were discussed and patient's questions were answered prior to conclusion to this encounter.  Patient verbalized understanding of preoperative instructions given in preadmission testing; discharge instructions available in EMR.    This note was dictated by a speech recognition.  Minor errors may have been detected in a speech recognition.

## 2024-02-20 NOTE — PREPROCEDURE INSTRUCTIONS
Medication List            Accurate as of February 20, 2024  1:02 PM. Always use your most recent med list.                acetaminophen 650 mg ER tablet  Commonly known as: Tylenol 8 HOUR  Medication Adjustments for Surgery: Take morning of surgery with sip of water, no other fluids     ascorbic acid 250 mg tablet  Commonly known as: Vitamin C  Medication Adjustments for Surgery: Stop 7 days before surgery     aspirin 81 mg EC tablet  TAKE 1 TABLET BY MOUTH ONCE DAILY AS DIRECTED.  Medication Adjustments for Surgery: Take morning of surgery with sip of water, no other fluids     chlorthalidone 25 mg tablet  Commonly known as: Hygroton  TAKE 1 TABLET BY MOUTH ONCE DAILY IN THE MORNING  Medication Adjustments for Surgery: Take morning of surgery with sip of water, no other fluids     cholecalciferol 25 MCG (1000 UT) capsule  Commonly known as: Vitamin D-3  Medication Adjustments for Surgery: Continue until night before surgery     DAILY MULTI-VITAMIN ORAL  Medication Adjustments for Surgery: Stop 7 days before surgery     * DULoxetine 60 mg DR capsule  Commonly known as: Cymbalta  TAKE 1 CAPSULE (60 MG) BY MOUTH ONCE DAILY.  Medication Adjustments for Surgery: Take morning of surgery with sip of water, no other fluids     * DULoxetine 30 mg DR capsule  Commonly known as: Cymbalta  TAKE 1 CAPSULE (30 MG) BY MOUTH ONCE DAILY.  Medication Adjustments for Surgery: Take morning of surgery with sip of water, no other fluids     ibuprofen 100 mg/5 mL suspension  Medication Adjustments for Surgery: Stop 7 days before surgery     lamoTRIgine 200 mg tablet  Commonly known as: LaMICtal  TAKE 1 TABLET (200 MG) BY MOUTH ONCE DAILY.  Medication Adjustments for Surgery: Take morning of surgery with sip of water, no other fluids     metoprolol succinate  mg 24 hr tablet  Commonly known as: Toprol XL  Take 1 tablet (100 mg) by mouth once daily. Do not crush or chew.  Medication Adjustments for Surgery: Take morning of  surgery with sip of water, no other fluids     Neilmed Sinus Rinse Complete nasal rinse  Generic drug: sod chloride-sodium bicarb  Administer 1 packet into affected nostril(s) 2 times a day. Mix with distilled warm water, follow instructions on kit.  Medication Adjustments for Surgery: Take morning of surgery with sip of water, no other fluids     pantoprazole 40 mg EC tablet  Commonly known as: Protonix  Take 1 tablet (40 mg) by mouth once daily in the morning. Take before meals. Do not crush, chew, or split.  Medication Adjustments for Surgery: Take morning of surgery with sip of water, no other fluids     rOPINIRole 3 mg tablet  Commonly known as: Requip  TAKE 1 TABLET (3 MG) BY MOUTH ONCE DAILY.  Medication Adjustments for Surgery: Take morning of surgery with sip of water, no other fluids     rosuvastatin 40 mg tablet  Commonly known as: Crestor  TAKE 1 TABLET BY MOUTH ONCE DAILY  Medication Adjustments for Surgery: Take morning of surgery with sip of water, no other fluids     tadalafil 5 mg tablet  Commonly known as: Cialis  TAKE 1 TABLET (5 MG) BY MOUTH ONCE DAILY. 1 HR PRIOR TO NEEDING  Medication Adjustments for Surgery: Stop 3 days before surgery           * This list has 2 medication(s) that are the same as other medications prescribed for you. Read the directions carefully, and ask your doctor or other care provider to review them with you.                            CONTACT SURGEON'S OFFICE IF YOU DEVELOP:  * Fever = 100.4 F   * New respiratory symptoms (e.g. cough, shortness of breath, respiratory distress, sore throat)  * Recent loss of taste or smell  *Flu like symptoms such as headache, fatigue or gastrointestinal symptoms  * You develop any open sores, shingles, burning or painful urination   AND/OR:  * You no longer wish to have the surgery.  * Any other personal circumstances change that may lead to the need to cancel or defer this surgery.  *You were admitted to any hospital within one week of  your planned procedure.    SMOKING:  *Quitting smoking can make a huge difference to your health and recovery from surgery.    *If you need help with quitting, call 2-792-QUIT-NOW.    THE DAY BEFORE SURGERY:  *Do not eat any food after midnight the night before surgery.   *You are permitted to drink clear liquids (i.e. water, black coffee (no milk or cream), tea, apple juice and electrolyte drinks (gatorade)) up to 10 ounces, up to 2 hours before your arrival time.  *You may chew gum until 2 hours before your surgery    SURGICAL TIME  *You will be contacted between 2 p.m. and 6 p.m. the business day before your surgery with your arrival time.  *If you haven't received a call by 6pm, call [phone].  *Scheduled surgery times may change and you will be notified if this occurs-check your personal voicemail for any updates.    ON THE MORNING OF SURGERY:  *Wear comfortable, loose fitting clothing.   *Do not use moisturizers, creams, lotions or perfume.  *All jewelry and valuables should be left at home.  *Prosthetic devices such as contact lenses, hearing aids, dentures, eyelash extensions, hairpins and body piercing must be removed before surgery.    BRING WITH YOU:  *Photo ID and insurance card  *Current list of medicines and allergies  *Pacemaker/Defibrillator/Heart stent cards  *CPAP machine and mask  *Slings/splints/crutches  *Copy of your complete Advanced Directive/DHPOA-if applicable  *Neurostimulator implant remote    PARKING AND ARRIVAL:  *Check in at the Main Entrance desk and let them know you are here for surgery.  *You will be directed to the 2nd floor surgical waiting area.    AFTER OUTPATIENT SURGERY:  *A responsible adult MUST accompany you at the time of discharge and stay with you for 24 hours after your surgery.  *You may NOT drive yourself home after surgery.  *You may use a taxi or ride sharing service (Lyft, Uber) to return home ONLY if you are accompanied by a friend or family  member.  *Instructions for resuming your medications will be provided by your surgeon.

## 2024-02-20 NOTE — H&P (VIEW-ONLY)
[facility]/PAT Evaluation       Name: Martín Mccormick (Martín Mccormick)  /Age: 1965/58 y.o.     In-Person         HPI        Date of Consult: 24    Referring Provider: Dr. Weinstein    Microdirect laryngoscopy; biopsy vocal fold lesion; steroid injection, KTP laser, 3/1/24    Martín Mccormick is a  58 year-old female who presents to the [facility] for perioperative risk assessment prior to surgery.    Patient presents with a primary diagnosis of vocal fold lesion. Incidental left vocal fold lesion, asymptomatic, history of intubation, non-Hodgkin's lymphoma. 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 However patient reports he has significant anxiety about cancer given his lymphoma history     This note was created in part upon personal review of patient's medical records.      Patient is scheduled to have Microdirect laryngoscopy; biopsy vocal fold lesion; steroid injection, KTP laser,      Pt denies any past history of anesthetic complications such as PONV, awareness, prolonged sedation, dental damage, aspiration, cardiac arrest, difficult intubation, difficult I.V. access or unexpected hospital admissions.  NO malignant hyperthermia and or pseudocholinesterase deficiency.  history of blood transfusions     STOP BANG 3    The patient is not a [employer] and will accept blood and blood products if medically indicated.   Type and screen not sent.     Past Medical History:   Diagnosis Date    Bipolar II disorder (CMS/HCC)     Cardiology follow-up encounter 2022    Palmer Castellanos DO    Coronary artery disease     s/p CABG X2    Erectile dysfunction     Generalized anxiety disorder     Hyperlipidemia     Hypertension     Lesion of vocal fold     Lung nodules     Obesity     Other chest pain     Personal history of non-Hodgkin lymphomas     Restless legs syndrome     Social phobia, unspecified     Social anxiety disorder    Vitamin D deficiency         Past Surgical History:   Procedure Laterality Date    CORONARY ARTERY BYPASS GRAFT  10/2022    OTHER SURGICAL HISTORY      Finger surgical procedure    TONSILLECTOMY         Social History     Socioeconomic History    Marital status:      Spouse name: Not on file    Number of children: Not on file    Years of education: Not on file    Highest education level: Not on file   Occupational History    Not on file   Tobacco Use    Smoking status: Never     Passive exposure: Never    Smokeless tobacco: Current     Types: Chew, Snuff   Vaping Use    Vaping Use: Never used   Substance and Sexual Activity    Alcohol use: Yes     Alcohol/week: 14.0 standard drinks of alcohol     Types: 14 Cans of beer per week    Drug use: Never    Sexual activity: Yes   Other Topics Concern    Not on file   Social History Narrative    Not on file     Social Determinants of Health     Financial Resource Strain: Not on file   Food Insecurity: Not on file   Transportation Needs: Not on file   Physical Activity: Not on file   Stress: Not on file   Social Connections: Not on file   Intimate Partner Violence: Not on file   Housing Stability: Not on file        Family History   Problem Relation Name Age of Onset    Other (Heart problem) Mother      Alcohol abuse Father's Brother      Alcohol abuse Paternal Grandmother      Alcohol abuse Paternal Grandfather      Heart attack Paternal Grandfather      Heart attack Paternal Great-Grandfather         Allergies   Allergen Reactions    Fluconazole Other     increased liver enzymes       Current Outpatient Medications:     acetaminophen (Tylenol 8 HOUR) 650 mg ER tablet, Take 1 tablet (650 mg) by mouth every 8 hours if needed for mild pain (1 - 3). Do not crush, chew, or split., Disp: , Rfl:     ascorbic acid (Vitamin C) 250 mg tablet, Take 2 tablets (500 mg) by mouth once daily., Disp: , Rfl:     aspirin 81 mg EC tablet, TAKE 1 TABLET BY MOUTH ONCE DAILY AS DIRECTED., Disp: 30 tablet, Rfl:  11    chlorthalidone (Hygroton) 25 mg tablet, TAKE 1 TABLET BY MOUTH ONCE DAILY IN THE MORNING, Disp: 30 tablet, Rfl: 11    cholecalciferol (Vitamin D-3) 25 MCG (1000 UT) capsule, Take by mouth., Disp: , Rfl:     DAILY MULTI-VITAMIN ORAL, Take by mouth., Disp: , Rfl:     DULoxetine (Cymbalta) 30 mg DR capsule, TAKE 1 CAPSULE (30 MG) BY MOUTH ONCE DAILY., Disp: 90 capsule, Rfl: 3    DULoxetine (Cymbalta) 60 mg DR capsule, TAKE 1 CAPSULE (60 MG) BY MOUTH ONCE DAILY., Disp: 90 capsule, Rfl: 3    ibuprofen 100 mg/5 mL suspension, Take 10 mL (200 mg) by mouth every 6 hours., Disp: , Rfl:     lamoTRIgine (LaMICtal) 200 mg tablet, TAKE 1 TABLET (200 MG) BY MOUTH ONCE DAILY., Disp: 90 tablet, Rfl: 0    metoprolol succinate XL (Toprol XL) 100 mg 24 hr tablet, Take 1 tablet (100 mg) by mouth once daily. Do not crush or chew., Disp: 30 tablet, Rfl: 11    rOPINIRole (Requip) 3 mg tablet, TAKE 1 TABLET (3 MG) BY MOUTH ONCE DAILY., Disp: 90 tablet, Rfl: 1    rosuvastatin (Crestor) 40 mg tablet, TAKE 1 TABLET BY MOUTH ONCE DAILY, Disp: 90 tablet, Rfl: 3    sod chloride-sodium bicarb (Neilmed Sinus Rinse Complete) nasal rinse, Administer 1 packet into affected nostril(s) 2 times a day. Mix with distilled warm water, follow instructions on kit., Disp: 60 each, Rfl: 0    tadalafil (Cialis) 5 mg tablet, TAKE 1 TABLET (5 MG) BY MOUTH ONCE DAILY. 1 HR PRIOR TO NEEDING, Disp: 90 tablet, Rfl: 0    pantoprazole (Protonix) 40 mg EC tablet, Take 1 tablet (40 mg) by mouth once daily in the morning. Take before meals. Do not crush, chew, or split. (Patient not taking: Reported on 2/20/2024), Disp: 30 tablet, Rfl: 11        PAT ROS:   Constitutional:   neg    Neuro/Psych:   neg    Eyes:   neg    Ears:   neg    Nose:   neg    Mouth:   neg    Throat:   neg    Neck:   neg    Cardio:    FUNCTIONAL 4 METS. DENIES SOB WALKING FROM [facility] TO PARKING LOT  Respiratory:   neg    Endocrine:   neg    GI:   neg    :   neg    Musculoskeletal:   neg   "  Hematologic:    DUE TO TAKING ASA   bruises/bleeds easily  Skin:  neg        Physical Exam  Vitals reviewed. Physical exam within normal limits.  (WEARS GLASSES)         PAT AIRWAY:   Airway:     Mallampati::  III    Neck ROM::  Full  normal            Heart Rate:  [86]   Temp:  [36.6 °C (97.9 °F)]   Resp:  [18]   BP: (119)/(86)   Height:  [177.8 cm (5' 10\")]   Weight:  [103 kg (227 lb 4.7 oz)]   SpO2:  [97 %]      EKG IN PAT 2/20/24:  NSR  PROLONGED QT  ABNORMAL EKG  HR 85 BPM    Lab Results   Component Value Date    GLUCOSE 98 02/20/2024    CALCIUM 8.8 02/20/2024     02/20/2024    K 3.5 02/20/2024    CO2 29 02/20/2024    CL 98 02/20/2024    BUN 17 02/20/2024    CREATININE 0.83 02/20/2024      Lab Results   Component Value Date    WBC 5.6 02/20/2024    HGB 15.6 02/20/2024    HCT 45.3 02/20/2024    MCV 89 02/20/2024     02/20/2024        Assessment and Plan:         Patient is a 58 year-old male scheduled for a with Dr. Weinstein on 3/1/24.  Patient has no active cardiac symptoms.   Patient denies any chest pain, tightness, heaviness, pressure, radiating pain, palpitations, irregular heartbeats, lightheadedness, cough, congestion, shortness of breath, THOMAS, PND, near syncope, weight loss or gain.     RCRI  1  , 6 % Risk of MACE    Cardiology:  -- History of CAD s/p CABG x2. Received cardiac clearance from Kia JOHNSON who states \"this patient's cardiac status is optimized for the proposed procedure and no further cardiac testing is required. Low dose ASA must continue uninterrupted throughout the perioperative period\"    Hematology:  Patient instructed to ambulate as soon as possible postoperatively to decrease thromboembolic risk.   Initiate mechanical DVT prophylaxis as soon as possible and initiate chemical prophylaxis when deemed safe from a bleeding standpoint post surgery.     Caprini: 8    LABS PER DR. WEINSTEIN    Risk assessment complete.  Patient is scheduled for a low surgical risk procedure.  "       Preoperative medication instructions were provided and reviewed with the patient.  Any additional testing or evaluation was explained to the patient.  Nothing by mouth instructions were discussed and patient's questions were answered prior to conclusion to this encounter.  Patient verbalized understanding of preoperative instructions given in preadmission testing; discharge instructions available in EMR.    This note was dictated by a speech recognition.  Minor errors may have been detected in a speech recognition.

## 2024-02-28 LAB
ATRIAL RATE: 85 BPM
P AXIS: 44 DEGREES
P OFFSET: 210 MS
P ONSET: 149 MS
PR INTERVAL: 146 MS
Q ONSET: 222 MS
QRS COUNT: 14 BEATS
QRS DURATION: 102 MS
QT INTERVAL: 404 MS
QTC CALCULATION(BAZETT): 480 MS
QTC FREDERICIA: 453 MS
R AXIS: 13 DEGREES
T AXIS: 60 DEGREES
T OFFSET: 424 MS
VENTRICULAR RATE: 85 BPM

## 2024-03-01 ENCOUNTER — PHARMACY VISIT (OUTPATIENT)
Dept: PHARMACY | Facility: CLINIC | Age: 59
End: 2024-03-01
Payer: COMMERCIAL

## 2024-03-01 ENCOUNTER — ANESTHESIA EVENT (OUTPATIENT)
Dept: OPERATING ROOM | Facility: HOSPITAL | Age: 59
End: 2024-03-01
Payer: COMMERCIAL

## 2024-03-01 ENCOUNTER — HOSPITAL ENCOUNTER (OUTPATIENT)
Facility: HOSPITAL | Age: 59
Setting detail: OUTPATIENT SURGERY
Discharge: HOME | End: 2024-03-01
Attending: OTOLARYNGOLOGY | Admitting: OTOLARYNGOLOGY
Payer: COMMERCIAL

## 2024-03-01 ENCOUNTER — ANESTHESIA (OUTPATIENT)
Dept: OPERATING ROOM | Facility: HOSPITAL | Age: 59
End: 2024-03-01
Payer: COMMERCIAL

## 2024-03-01 VITALS
HEART RATE: 83 BPM | RESPIRATION RATE: 14 BRPM | OXYGEN SATURATION: 94 % | DIASTOLIC BLOOD PRESSURE: 81 MMHG | WEIGHT: 224.65 LBS | SYSTOLIC BLOOD PRESSURE: 125 MMHG | BODY MASS INDEX: 32.16 KG/M2 | HEIGHT: 70 IN | TEMPERATURE: 97.5 F

## 2024-03-01 DIAGNOSIS — J38.3 LESION OF VOCAL FOLD: Primary | ICD-10-CM

## 2024-03-01 PROBLEM — G35 MULTIPLE SCLEROSIS (MULTI): Status: ACTIVE | Noted: 2024-03-01

## 2024-03-01 PROBLEM — K21.9 GASTROESOPHAGEAL REFLUX DISEASE: Status: ACTIVE | Noted: 2024-03-01

## 2024-03-01 PROBLEM — F41.9 ANXIETY: Status: ACTIVE | Noted: 2024-03-01

## 2024-03-01 PROCEDURE — A31541 PR LARYNGOSCOPY,DIRCT,OP SCOP,EXC TUMR: Performed by: ANESTHESIOLOGY

## 2024-03-01 PROCEDURE — 2500000004 HC RX 250 GENERAL PHARMACY W/ HCPCS (ALT 636 FOR OP/ED): Performed by: ANESTHESIOLOGIST ASSISTANT

## 2024-03-01 PROCEDURE — 88312 SPECIAL STAINS GROUP 1: CPT | Performed by: DENTIST

## 2024-03-01 PROCEDURE — 7100000001 HC RECOVERY ROOM TIME - INITIAL BASE CHARGE: Performed by: OTOLARYNGOLOGY

## 2024-03-01 PROCEDURE — 7100000009 HC PHASE TWO TIME - INITIAL BASE CHARGE: Performed by: OTOLARYNGOLOGY

## 2024-03-01 PROCEDURE — 3600000008 HC OR TIME - EACH INCREMENTAL 1 MINUTE - PROCEDURE LEVEL THREE: Performed by: OTOLARYNGOLOGY

## 2024-03-01 PROCEDURE — 88305 TISSUE EXAM BY PATHOLOGIST: CPT | Performed by: DENTIST

## 2024-03-01 PROCEDURE — 2720000007 HC OR 272 NO HCPCS: Performed by: OTOLARYNGOLOGY

## 2024-03-01 PROCEDURE — 7100000002 HC RECOVERY ROOM TIME - EACH INCREMENTAL 1 MINUTE: Performed by: OTOLARYNGOLOGY

## 2024-03-01 PROCEDURE — RXMED WILLOW AMBULATORY MEDICATION CHARGE

## 2024-03-01 PROCEDURE — 2500000005 HC RX 250 GENERAL PHARMACY W/O HCPCS: Performed by: ANESTHESIOLOGIST ASSISTANT

## 2024-03-01 PROCEDURE — 88305 TISSUE EXAM BY PATHOLOGIST: CPT | Mod: TC,AHULAB | Performed by: OTOLARYNGOLOGY

## 2024-03-01 PROCEDURE — 31571 LARYNGOSCOP W/VC INJ + SCOPE: CPT | Performed by: OTOLARYNGOLOGY

## 2024-03-01 PROCEDURE — 3600000003 HC OR TIME - INITIAL BASE CHARGE - PROCEDURE LEVEL THREE: Performed by: OTOLARYNGOLOGY

## 2024-03-01 PROCEDURE — 3700000001 HC GENERAL ANESTHESIA TIME - INITIAL BASE CHARGE: Performed by: OTOLARYNGOLOGY

## 2024-03-01 PROCEDURE — 2500000004 HC RX 250 GENERAL PHARMACY W/ HCPCS (ALT 636 FOR OP/ED): Performed by: OTOLARYNGOLOGY

## 2024-03-01 PROCEDURE — 31541 LARYNSCOP W/TUMR EXC + SCOPE: CPT | Performed by: OTOLARYNGOLOGY

## 2024-03-01 PROCEDURE — 3700000002 HC GENERAL ANESTHESIA TIME - EACH INCREMENTAL 1 MINUTE: Performed by: OTOLARYNGOLOGY

## 2024-03-01 PROCEDURE — A31541 PR LARYNGOSCOPY,DIRCT,OP SCOP,EXC TUMR: Performed by: ANESTHESIOLOGIST ASSISTANT

## 2024-03-01 PROCEDURE — 7100000010 HC PHASE TWO TIME - EACH INCREMENTAL 1 MINUTE: Performed by: OTOLARYNGOLOGY

## 2024-03-01 PROCEDURE — 2500000005 HC RX 250 GENERAL PHARMACY W/O HCPCS: Performed by: ANESTHESIOLOGY

## 2024-03-01 PROCEDURE — A4217 STERILE WATER/SALINE, 500 ML: HCPCS | Performed by: OTOLARYNGOLOGY

## 2024-03-01 RX ORDER — ONDANSETRON HYDROCHLORIDE 2 MG/ML
4 INJECTION, SOLUTION INTRAVENOUS ONCE AS NEEDED
Status: DISCONTINUED | OUTPATIENT
Start: 2024-03-01 | End: 2024-03-01 | Stop reason: HOSPADM

## 2024-03-01 RX ORDER — MEPERIDINE HYDROCHLORIDE 25 MG/ML
12.5 INJECTION INTRAMUSCULAR; INTRAVENOUS; SUBCUTANEOUS EVERY 10 MIN PRN
Status: DISCONTINUED | OUTPATIENT
Start: 2024-03-01 | End: 2024-03-01 | Stop reason: HOSPADM

## 2024-03-01 RX ORDER — OXYCODONE HYDROCHLORIDE 5 MG/1
5 TABLET ORAL EVERY 4 HOURS PRN
Status: DISCONTINUED | OUTPATIENT
Start: 2024-03-01 | End: 2024-03-01 | Stop reason: HOSPADM

## 2024-03-01 RX ORDER — SODIUM CHLORIDE, SODIUM LACTATE, POTASSIUM CHLORIDE, CALCIUM CHLORIDE 600; 310; 30; 20 MG/100ML; MG/100ML; MG/100ML; MG/100ML
100 INJECTION, SOLUTION INTRAVENOUS CONTINUOUS
Status: DISCONTINUED | OUTPATIENT
Start: 2024-03-01 | End: 2024-03-01 | Stop reason: HOSPADM

## 2024-03-01 RX ORDER — LIDOCAINE HYDROCHLORIDE 20 MG/ML
INJECTION, SOLUTION EPIDURAL; INFILTRATION; INTRACAUDAL; PERINEURAL AS NEEDED
Status: DISCONTINUED | OUTPATIENT
Start: 2024-03-01 | End: 2024-03-01

## 2024-03-01 RX ORDER — ONDANSETRON HYDROCHLORIDE 2 MG/ML
INJECTION, SOLUTION INTRAVENOUS AS NEEDED
Status: DISCONTINUED | OUTPATIENT
Start: 2024-03-01 | End: 2024-03-01

## 2024-03-01 RX ORDER — MIDAZOLAM HYDROCHLORIDE 1 MG/ML
INJECTION INTRAMUSCULAR; INTRAVENOUS AS NEEDED
Status: DISCONTINUED | OUTPATIENT
Start: 2024-03-01 | End: 2024-03-01

## 2024-03-01 RX ORDER — LIDOCAINE HYDROCHLORIDE 10 MG/ML
0.1 INJECTION, SOLUTION EPIDURAL; INFILTRATION; INTRACAUDAL; PERINEURAL ONCE
Status: DISCONTINUED | OUTPATIENT
Start: 2024-03-01 | End: 2024-03-01 | Stop reason: HOSPADM

## 2024-03-01 RX ORDER — PROPOFOL 10 MG/ML
INJECTION, EMULSION INTRAVENOUS AS NEEDED
Status: DISCONTINUED | OUTPATIENT
Start: 2024-03-01 | End: 2024-03-01

## 2024-03-01 RX ORDER — DEXAMETHASONE SODIUM PHOSPHATE 4 MG/ML
INJECTION, SOLUTION INTRA-ARTICULAR; INTRALESIONAL; INTRAMUSCULAR; INTRAVENOUS; SOFT TISSUE AS NEEDED
Status: DISCONTINUED | OUTPATIENT
Start: 2024-03-01 | End: 2024-03-01

## 2024-03-01 RX ORDER — SODIUM CHLORIDE 0.9 G/100ML
IRRIGANT IRRIGATION AS NEEDED
Status: DISCONTINUED | OUTPATIENT
Start: 2024-03-01 | End: 2024-03-01 | Stop reason: HOSPADM

## 2024-03-01 RX ORDER — EPINEPHRINE 1 MG/ML
INJECTION INTRAMUSCULAR; INTRAVENOUS; SUBCUTANEOUS AS NEEDED
Status: DISCONTINUED | OUTPATIENT
Start: 2024-03-01 | End: 2024-03-01 | Stop reason: HOSPADM

## 2024-03-01 RX ORDER — DEXAMETHASONE SODIUM PHOSPHATE 100 MG/10ML
INJECTION INTRAMUSCULAR; INTRAVENOUS AS NEEDED
Status: DISCONTINUED | OUTPATIENT
Start: 2024-03-01 | End: 2024-03-01 | Stop reason: HOSPADM

## 2024-03-01 RX ORDER — ROCURONIUM BROMIDE 10 MG/ML
INJECTION, SOLUTION INTRAVENOUS AS NEEDED
Status: DISCONTINUED | OUTPATIENT
Start: 2024-03-01 | End: 2024-03-01

## 2024-03-01 RX ORDER — FENTANYL CITRATE 50 UG/ML
INJECTION, SOLUTION INTRAMUSCULAR; INTRAVENOUS AS NEEDED
Status: DISCONTINUED | OUTPATIENT
Start: 2024-03-01 | End: 2024-03-01

## 2024-03-01 RX ORDER — OMEPRAZOLE 20 MG/1
20 CAPSULE, DELAYED RELEASE ORAL
Qty: 60 CAPSULE | Refills: 1 | Status: SHIPPED | OUTPATIENT
Start: 2024-03-01 | End: 2024-05-15 | Stop reason: SDUPTHER

## 2024-03-01 RX ORDER — TRIAMCINOLONE ACETONIDE 40 MG/ML
INJECTION, SUSPENSION INTRA-ARTICULAR; INTRAMUSCULAR AS NEEDED
Status: DISCONTINUED | OUTPATIENT
Start: 2024-03-01 | End: 2024-03-01 | Stop reason: HOSPADM

## 2024-03-01 RX ADMIN — MIDAZOLAM HYDROCHLORIDE 2 MG: 1 INJECTION, SOLUTION INTRAMUSCULAR; INTRAVENOUS at 11:59

## 2024-03-01 RX ADMIN — LIDOCAINE HYDROCHLORIDE 90 MG: 20 INJECTION, SOLUTION EPIDURAL; INFILTRATION; INTRACAUDAL; PERINEURAL at 12:07

## 2024-03-01 RX ADMIN — SUGAMMADEX 200 MG: 100 INJECTION, SOLUTION INTRAVENOUS at 13:08

## 2024-03-01 RX ADMIN — SODIUM CHLORIDE, POTASSIUM CHLORIDE, SODIUM LACTATE AND CALCIUM CHLORIDE 100 ML/HR: 600; 310; 30; 20 INJECTION, SOLUTION INTRAVENOUS at 11:05

## 2024-03-01 RX ADMIN — PROPOFOL 180 MG: 10 INJECTION, EMULSION INTRAVENOUS at 12:07

## 2024-03-01 RX ADMIN — ROCURONIUM BROMIDE 10 MG: 10 INJECTION, SOLUTION INTRAVENOUS at 12:33

## 2024-03-01 RX ADMIN — ROCURONIUM BROMIDE 60 MG: 10 INJECTION, SOLUTION INTRAVENOUS at 12:07

## 2024-03-01 RX ADMIN — ONDANSETRON 4 MG: 2 INJECTION INTRAMUSCULAR; INTRAVENOUS at 13:01

## 2024-03-01 RX ADMIN — DEXAMETHASONE SODIUM PHOSPHATE 10 MG: 4 INJECTION, SOLUTION INTRAMUSCULAR; INTRAVENOUS at 12:11

## 2024-03-01 RX ADMIN — FENTANYL CITRATE 50 MCG: 50 INJECTION, SOLUTION INTRAMUSCULAR; INTRAVENOUS at 12:07

## 2024-03-01 ASSESSMENT — PAIN SCALES - GENERAL
PAIN_LEVEL: 0
PAINLEVEL_OUTOF10: 0 - NO PAIN

## 2024-03-01 ASSESSMENT — COLUMBIA-SUICIDE SEVERITY RATING SCALE - C-SSRS
1. IN THE PAST MONTH, HAVE YOU WISHED YOU WERE DEAD OR WISHED YOU COULD GO TO SLEEP AND NOT WAKE UP?: NO
2. HAVE YOU ACTUALLY HAD ANY THOUGHTS OF KILLING YOURSELF?: NO
2. HAVE YOU ACTUALLY HAD ANY THOUGHTS OF KILLING YOURSELF?: NO
1. IN THE PAST MONTH, HAVE YOU WISHED YOU WERE DEAD OR WISHED YOU COULD GO TO SLEEP AND NOT WAKE UP?: NO
6. HAVE YOU EVER DONE ANYTHING, STARTED TO DO ANYTHING, OR PREPARED TO DO ANYTHING TO END YOUR LIFE?: NO
6. HAVE YOU EVER DONE ANYTHING, STARTED TO DO ANYTHING, OR PREPARED TO DO ANYTHING TO END YOUR LIFE?: NO

## 2024-03-01 ASSESSMENT — PAIN - FUNCTIONAL ASSESSMENT
PAIN_FUNCTIONAL_ASSESSMENT: 0-10

## 2024-03-01 NOTE — PERIOPERATIVE NURSING NOTE
1345- Patient appropriate for phase 2 care at this time.  1350- Discharge instructions reviewed with patient and family at this time with all questions answered and understanding verbalized.  1353- Calling meds to beds pharmacist to coordinate prescription drop off.   1357- Patient will be picking up medications from [facility].  1358- Both of patient's PIV removed with catheter tips intact.  1402- Patient dangled at bedside to ensure no dizziness. Patient dressed at bedside with wife present.   1406- Patient wife going to get car, patient placed into wheelchair for transport.  1410- Patient discharged in wheelchair.

## 2024-03-01 NOTE — OP NOTE
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]     Procedures  Microdirect laryngoscopy, biopsy, and KTP laser excision vocal fold lesion [06254]  Vocal fold injection, steroid, left [34113]    Microdirect Laryngoscopy; Biopsy Vocal Fold Lesion; Steroid Injection; KTP Laser  46856 - IN LARYNGOSCOPY W/WO TRACHEOSCOPY W/MICRO/TELESCOPE    IN LARYNGOSCOPY W/BIOPSY MICROSCOPE/TELESCOPE [12152]  IN LARGSC W/NJX VOCAL CORD THER W/MICRO/TELESCOPE [90117]  IN LARGSC EXC ROMERO&/STRPG CORDS/EPIGL MCRSCP/TLSCP [20516]    Surgeons      * Marina Weinstein - Primary    Resident/Fellow/Other Assistant:  Surgeon(s) and Role: Alisha Ernandez - Resident - Assisting    Procedure Summary  Anesthesia: General  ASA: III  Anesthesia Staff: Anesthesiologist: Etienne Gilliland MD  C-AA: REJI Hyman  Estimated Blood Loss: 1mL  Intra-op Medications:   Administrations occurring from 1230 to 1400 on 24:   Medication Name Total Dose   triamcinolone acetonide (Kenalog-40) injection 40 mg   lactated Ringer's infusion Cannot be calculated              Anesthesia Record               Intraprocedure I/O Totals          Intake    lactated Ringer's infusion 800.00 mL    Total Intake 800 mL          Specimen:   ID Type Source Tests Collected by Time   1 : LEFT VOCAL CORD LESION POSTERIOR Tissue VOCAL CORD BIOPSY LEFT SURGICAL PATHOLOGY EXAM Marina Weinstein MD 3/1/2024 1235   2 : LEFT VOCAL CORD LESION ANTERIOR MEDIAL Tissue VOCAL CORD BIOPSY LEFT SURGICAL PATHOLOGY EXAM Marina Weinstein MD 3/1/2024 1244        Staff:   Circulator: Meche Yi RN  Relief Circulator: Maddy Weeks RN  Relief Scrub: Robyn Dennison  Scrub Person: Jes Beltran         Drains and/or Catheters: * None in log *    Tourniquet Times:         Implants:      Findings:   Left vocal process lesion s/p biopsy, KTP laser ablation, kenalog injection    Indications: Martín Mccormick is an 58 y.o. male who is having surgery for Lesion of vocal fold [J38.3].     The patient was seen in the preoperative area. The risks, benefits, complications, treatment options, non-operative alternatives, expected recovery and outcomes were discussed with the patient. The possibilities of reaction to medication, pulmonary aspiration, injury to surrounding structures, bleeding, recurrent infection, the need for additional procedures, failure to diagnose a condition, and creating a complication requiring transfusion or operation were discussed with the patient. The patient concurred with the proposed plan, giving informed consent.  The site of surgery was properly noted/marked if necessary per policy. The patient has been actively warmed in preoperative area. Preoperative antibiotics are not indicated. Venous thrombosis prophylaxis have been ordered including bilateral sequential compression devices    Procedure Details:     The patient was taken to the operative suite and transferred to the operating table and laid supine. A pre-operative timeout was performed with all participating parties. General anesthesia was induced, and the patient was intubated with a 5-0 laser-safe Tenax tube. A final timeout was completed and we began with our procedure.     A dental guard was used to protect the patient's teeth. The eyes, face and neck were protected appropriately from laser instrumentation. A Dedo laryngoscope was used to expose the bilateral vocal folds and false folds and the patient was suspended to allow visualization of the larynx with the findings noted above. The telescope was then advanced past the glottis to visualize the trachea to rule out any further lesions or airway narrowing with the findings noted above. Photos were taken with 0 degree telescopes. A wet cottonoid was placed  subglottically to protect the laser safe tube.      The operating microscope was brought into the field. Pieces of the lesion was first taken for biopsy and sent for permanent pathology.  The KTP laser on a setting of 30 orta, 15 pulse width and 2 pulses per second was then used to ablate the lesion and feeding vessels.  Hemostasis was achieved with epinephrine-soaked cottonoids. 0.5 mL of kenalog 40mg/mL was injected into the posterior left vocal process. The subglottic cottonoid was removed, laryngotracheal anesthesia was applied, and the patient was taken out of suspension. The patient was turned back to our anesthesia colleagues for an uncomplicated wakeup.      Complications:  None; patient tolerated the procedure well.    Disposition: PACU - hemodynamically stable.  Condition: stable       Additional Details:           Attending Attestation: I was present and scrubbed for the entire procedure.      Marina Weinstein MD, MAEd    [facility] [employer]  [facility]  Department of Otolaryngology - Head and Neck Surgery  [facility]

## 2024-03-01 NOTE — ANESTHESIA PREPROCEDURE EVALUATION
Patient: Martín Mccormick    Procedure Information       Date/Time: 03/01/24 2180    Procedure: Microdirect Laryngoscopy; Biopsy Vocal Fold Lesion; Steroid Injection; KTP Laser    Location: [facility] A OR 01 / [facility] A OR    Surgeons: Marina Weinstein MD            Relevant Problems   Anesthesia (within normal limits)      Cardiovascular   (+) CAD (coronary artery disease)   (+) History of coronary artery bypass graft (2022)   (+) Hyperlipemia   (+) Hypertension      Endocrine   (+) Class 1 obesity due to excess calories with serious comorbidity and body mass index (BMI) of 30.0 to 30.9 in adult   (+) Class 1 obesity due to excess calories with serious comorbidity and body mass index (BMI) of 31.0 to 31.9 in adult      GI   (+) Gastroesophageal reflux disease   (+) Hiatal hernia      /Renal (within normal limits)      Neuro/Psych  RLS   (+) Anxiety   (+) Bipolar II disorder (CMS/HCC)   (+) Multiple sclerosis (CMS/HCC)      Pulmonary  cough   (+) Lung nodules      GI/Hepatic (within normal limits)      Hematology (within normal limits)  NHL         Clinical information reviewed:    Allergies                NPO Detail:  No data recorded     Physical Exam    Airway  Mallampati: II     Cardiovascular    Dental    Pulmonary    Abdominal          Anesthesia Plan    History of general anesthesia?: yes  History of complications of general anesthesia?: no    ASA 3     general     intravenous induction   Anesthetic plan and risks discussed with patient.

## 2024-03-01 NOTE — ANESTHESIA PROCEDURE NOTES
Airway  Date/Time: 3/1/2024 11:45 AM  Urgency: elective    Airway not difficult    Staffing  Performed: SHANNAN   Authorized by: Etienne Gilliland MD    Performed by: REJI Hyman  Patient location during procedure: OR    Indications and Patient Condition  Indications for airway management: anesthesia  Spontaneous Ventilation: absent  Sedation level: deep  Preoxygenated: yes  Patient position: sniffing  Mask difficulty assessment: 1 - vent by mask    Final Airway Details  Final airway type: endotracheal airway      Successful airway: laser tube  Cuffed: yes   Successful intubation technique: direct laryngoscopy  Facilitating devices/methods: intubating stylet  Endotracheal tube insertion site: oral  Blade: Anayeli  Blade size: #4  Cormack-Lehane Classification: grade I - full view of glottis  Placement verified by: chest auscultation and capnometry   Measured from: lips  ETT to lips (cm): 22  Number of attempts at approach: 1

## 2024-03-01 NOTE — DISCHARGE INSTRUCTIONS
Postoperative Care Instructions:  Microdirect Laryngoscopy with Removal of Vocal Cord Lesion, Vocal Cord Injection with Steroids     Postoperative Care:  For your surgery, special microscopic instruments were used and an operating telescope was placed in your mouth to look at your vocal cords. No external incisions made.       It is normal for your voice to be hoarse for several days or weeks after surgery while the healing process occurs.      It is very important to use a “conservative” or “confidential voice” after surgery to allow your voice to recover.  This means that you are using your normal voice at a much lower volume than usual, without any straining, yelling, screaming, or singing.  This typically sounds like a soft or breathy whisper.  It is also important to avoid extended periods of voice use.   Do not speak to anyone who is farther than an arm's length away, as this would require you to raise your voice.       It is very important to avoid excessive coughing or throat clearing after surgery, as this will slow down the healing process.  If you have an urge to cough that you cannot control on your own with relaxation techniques, you can purchase an over-the-counter cough suppressant to use, but make sure it does not interact with your other medications.       Finally, make sure to drink plenty of water to stay well hydrated after surgery, as this will help to speed your recovery by keeping your secretions thin and your vocal cords moist.  Use of cough lozenges is also allowed.       Diet: You may resume your normal diet after surgery. You may want to start out with liquids and light meals and advance to your usual diet as tolerated.      Our Nurse will contact you a few days after surgery to schedule your follow up appointment, which is typically 3-6 weeks after surgery.  For any questions or concerns, please call the respective office between the hours of 9am-4pm.   Please call:  Dr. Weinstein's office @  [phone]  If issues arise over the weekend or after hours, please call our hospital  at [phone] and ask for the ENT resident on call.     What to Expect Following Surgery:     The following are some symptoms that may follow your recent surgery:     Pain - Some mild pain is normal after surgery.  As adequate pain control is necessary for healing, please take over-the-counter Tylenol or Motrin per the package directions as needed for pain.  Occasionally, a narcotic pain medication is prescribed for your use after surgery.  If this is the case, please take the medication only as directed.  You may need to take an over-the-counter stool softener as narcotic pain medications can cause constipation. Massage, cold packs, relaxation techniques, listening to music, and positive thinking will also help control your postoperative pain.     Taste disturbance and/or tongue numbness - Due to the surgical instruments used during surgery, you may experience tongue numbness and/or taste disturbance after surgery, but this is usually temporary.  It may take 1-4 weeks to completely resolve.  It is also normal for your tongue to feel swollen or bruised after surgery, and this will also resolve in a few days.     Bleeding - For a few days after surgery, it is normal to cough up some blood in your mucus.  However, if you experience continuous bright red bleeding from your mouth or if you are coughing up blood clots, please call your doctor's office immediately.  If you are on any blood thinning medications, such as Aspirin, Plavix, or Coumadin, you may restart them immediately after surgery unless you were specifically told not to do so by your surgeon.     Muscle aches/soreness - You may experience generalized muscle aches and/or soreness after surgery, and this is a normal effect of anesthesia.  They should completely resolve after a few days.      Swelling/throat tightness - If you were given steroid medication, this  can help with swelling and should be taken as directed. The side effects from steroid use is typically minimal when taken for short periods, as used in these cases. If you have questions, please discuss with your pharmacist.

## 2024-03-01 NOTE — ANESTHESIA POSTPROCEDURE EVALUATION
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])    Surgeons: Marina Weinstein MD Responsible Provider: Etienne Gilliland MD    Anesthesia Type: general ASA Status: 3            Anesthesia Type: general    Vitals Value Taken Time   /74 03/01/24 1319   Temp 36.1 °C (97 °F) 03/01/24 1319   Pulse 82 03/01/24 1319   Resp 15 03/01/24 1319   SpO2 100 % 03/01/24 1322       Anesthesia Post Evaluation    Patient location during evaluation: bedside  Patient participation: complete - patient cannot participate  Level of consciousness: awake  Pain score: 0  Pain management: adequate  Airway patency: patent  Cardiovascular status: acceptable  Respiratory status: acceptable  Hydration status: acceptable  Postoperative Nausea and Vomiting: none        No notable events documented.

## 2024-03-04 ENCOUNTER — PHARMACY VISIT (OUTPATIENT)
Dept: PHARMACY | Facility: CLINIC | Age: 59
End: 2024-03-04
Payer: COMMERCIAL

## 2024-03-04 PROCEDURE — RXMED WILLOW AMBULATORY MEDICATION CHARGE

## 2024-03-04 RX ORDER — METHYLPREDNISOLONE 4 MG/1
TABLET ORAL
Qty: 21 TABLET | Refills: 0 | OUTPATIENT
Start: 2024-03-04

## 2024-03-08 LAB
LABORATORY COMMENT REPORT: NORMAL
PATH REPORT.FINAL DX SPEC: NORMAL
PATH REPORT.GROSS SPEC: NORMAL
PATH REPORT.RELEVANT HX SPEC: NORMAL
PATH REPORT.TOTAL CANCER: NORMAL

## 2024-03-26 ENCOUNTER — PHARMACY VISIT (OUTPATIENT)
Dept: PHARMACY | Facility: CLINIC | Age: 59
End: 2024-03-26
Payer: COMMERCIAL

## 2024-03-26 DIAGNOSIS — F31.9 BIPOLAR AFFECTIVE DISORDER, REMISSION STATUS UNSPECIFIED (MULTI): ICD-10-CM

## 2024-03-26 PROCEDURE — RXMED WILLOW AMBULATORY MEDICATION CHARGE

## 2024-03-26 RX ORDER — LAMOTRIGINE 200 MG/1
200 TABLET ORAL DAILY
Qty: 90 TABLET | Refills: 0 | Status: SHIPPED | OUTPATIENT
Start: 2024-03-26 | End: 2025-03-26

## 2024-04-01 ENCOUNTER — APPOINTMENT (OUTPATIENT)
Dept: BEHAVIORAL HEALTH | Facility: CLINIC | Age: 59
End: 2024-04-01
Payer: COMMERCIAL

## 2024-04-02 NOTE — PROGRESS NOTES
"Martín Mccormick is a 58 y.o. male who presents for Follow-up (6 months)    Arthritis: Following with Dr. Wylie. Got a shot in his left knee, which helped. He is dealing with pain in knees, elbows, and hips. He was told to take ibuprofen but that doesn't helps.     Vocal cord lesion: He was having PND and went to ENT, was found to have a lesion on his vocal cord. Got it removed. He was told yesterday that it was benign.    CAD, tachycardia, HTN: Following with cardiology, Dr. Edwards. He had cardiac bypass detected through calcium score. Bypass Nov 2022 (two vessel). Started on chlorthalidone. He is taking metoprolol and rosuvastatin.     Bipolar disorder: On cymbalta and lamictal, feels these are helping. No SE's. He has been more down lately. He is planning to see Dr. Brown and discuss the dose.      RLS: He is taking higher dose ropinirole which is helping.     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 Radiation to the area anterior to his heart which contributed to his cardiac condition.       Review of systems completed and unremarkable other than what is documented in HPI.    Objective   BP (!) 144/98 (BP Location: Left arm, Patient Position: Sitting, BP Cuff Size: Large adult)   Pulse 96   Ht 1.778 m (5' 10\")   Wt 103 kg (227 lb)   BMI 32.57 kg/m²     Gen: No acute distress, alert and oriented x3, pleasant   HEENT: moist mucous membranes, b/l external auditory canals are clear of debris, TMs within normal limits, no oropharyngeal lesions, eomi, perrla   Neck: thyroid within normal limits, no lymphadenopathy   CV: RRR, normal S1/S2, no murmur   Resp: Clear to auscultation bilaterally, no wheezes or rhonchi appreciated  Abd: soft, nontender, non-distended, no guarding/rigidity, bowel sounds present  Extr: no edema, no calf tenderness  Derm: Skin is warm and dry, no rashes appreciated  Psych: mood is good, affect is congruent, good hygiene, normal " speech and eye contact  Neuro: cranial nerves grossly intact, normal gait    Assessment/Plan     #CAD  #HTN  S/p two vessel bypass Nov 2022  Following with cardiology, Dr. Edwards  Currently on metoprolol, chlorthalidone, and rosuvastatin     #Bipolar disorder:   Reasonable control on cymbalta and lamictal  Planning to get in with psych to discuss dose adjustment     #RLS:   Not controlled on ropinirole  Trial pramipexole    #ED  On cialis, increasing dose    #BPH  On cialis switch to tamsulosin    #Vocal cord irritation  On PPI  Planning to stop soon  Doing well since surgery     #H/o lymphoblastic lymphoma:   T cell lymphoma  s/p chemo and radiation for this 12 years ago     #Vitamin D deficiency   On replacement     HCM:  C-scope 2023 neg, next due 2033  Monitoring yearly PSA  S/p flu and shingles

## 2024-04-03 ENCOUNTER — PHARMACY VISIT (OUTPATIENT)
Dept: PHARMACY | Facility: CLINIC | Age: 59
End: 2024-04-03
Payer: COMMERCIAL

## 2024-04-03 ENCOUNTER — OFFICE VISIT (OUTPATIENT)
Dept: OTOLARYNGOLOGY | Facility: HOSPITAL | Age: 59
End: 2024-04-03
Payer: COMMERCIAL

## 2024-04-03 VITALS — WEIGHT: 225.5 LBS | TEMPERATURE: 98.6 F | HEIGHT: 70 IN | BODY MASS INDEX: 32.28 KG/M2

## 2024-04-03 DIAGNOSIS — R09.81 NASAL CONGESTION: Primary | ICD-10-CM

## 2024-04-03 PROCEDURE — 31575 DIAGNOSTIC LARYNGOSCOPY: CPT | Performed by: OTOLARYNGOLOGY

## 2024-04-03 PROCEDURE — 99215 OFFICE O/P EST HI 40 MIN: CPT | Performed by: OTOLARYNGOLOGY

## 2024-04-03 PROCEDURE — RXMED WILLOW AMBULATORY MEDICATION CHARGE

## 2024-04-03 RX ORDER — FLUTICASONE PROPIONATE 50 MCG
2 SPRAY, SUSPENSION (ML) NASAL 2 TIMES DAILY
Qty: 16 G | Refills: 11 | Status: SHIPPED | OUTPATIENT
Start: 2024-04-03 | End: 2025-04-03

## 2024-04-03 NOTE — PATIENT INSTRUCTIONS
"    NASAL SALINE IRRIGATIONS  - NeilMed sinus rinse, available over the counter, twice daily. Use before sprays.    NASAL SPRAY USE INSTRUCTIONS    · Blow your nose on both sides to clear any debris or mucous  · Prime and activate the delivery device as recommended by the   · Position your head by tilting forward (this aligns the medication more vertically and makes it easier to use all the medication from the bottom of the bottle each month)  · Aim the applicator tip about 30-45 degrees from the floor of the nose and direct the spray to the outer corner of the eye on the same side (right side to outside of the right eye,       left side to outside of left eye)       - This technique helps to avoid spraying the septum (structure that divides one side of the nose from the other) that can cause irritation and bleeding     - Do not spray straight up or straight back into your nose         · NASAL STEROIDS (example: Flonase, Nasacort, Rhinocort) - Consistent utilization is important for maximal benefit (either 1 puff each nostril twice per day or 2 sprays each     nostril once per day)  · TOPICAL ANTIHISTAMINES (example: Azelastine, Patanase) and Ipratropium - can be utilized as needed for symptoms     \"[facility] is pleased to meet your health care needs.  We strive to deliver the highest quality and most value based care possible.  Thank you for giving us the opportunity to serve you.\"    "

## 2024-04-03 NOTE — PROGRESS NOTES
Patient: Martín Mccormick   MRN: [id number] YOB: 1965   Sex: male Age: 58 y.o.  Date of Service: 4/3/2024       ASSESSMENT AND PLAN  I discussed the findings with Martín Mccormick and have recommended the followin.  Left vocal fold granuloma, asymptomatic, history of intubation, non-Hodgkin's lymphoma s/p biopsy, KTP laser ablation, steroid injection on 3/1/24. Path consistent with granuloma. Healing well without voice issues.   - Follow-up with me as needed    2. Globus sensation that have not improve on PPI suspected secondary to habitual throat clearing  - Wean PPI  - Throat clearing strategies     3. Nasal congestion, post nasal drip  - Nasal saline irrigations and Flonase BID  - Consider rhinology referral if no improvement      CHIEF COMPLAINT  Chief Complaint   Patient presents with    Post-op       HISTORY OF PRESENT ILLNESS  Martín Mccormick is a 58 y.o. male who we have been following for a left vocal fold granuloma s/p biopsy, KTP laser, steroid injection 3/1/24.  Path consistent with granuloma.    He is doing well since surgery. Voice recovered. No dysphagia. He does have persistent issues with globus sensation and post nasal drip and nasal congestion.    PMH: HTN, CAD s/p CABG, non hodgkins lymphoma  SH: never smoker, current drinking, 14 drinks/week        ADDITIONAL HISTORY  Past Medical History  He has a past medical history of Bipolar II disorder (CMS/[facility]), Cardiology follow-up encounter (2022), Coronary artery disease, Erectile dysfunction, Generalized anxiety disorder, Hyperlipidemia, Hypertension, Lesion of vocal fold, Lung nodules, Obesity, Other chest pain, Personal history of non-Hodgkin lymphomas, Restless legs syndrome, Social phobia, unspecified, and Vitamin D deficiency. Surgical History  He has a past surgical history that includes Tonsillectomy; Other surgical history; and Coronary artery bypass graft (10/2022).   Social History  He reports that he has never  "smoked. He has never been exposed to tobacco smoke. His smokeless tobacco use includes chew and snuff. He reports current alcohol use of about 14.0 standard drinks of alcohol per week. He reports that he does not use drugs. Allergies  Fluconazole     Family History  Family History   Problem Relation Name Age of Onset    Other (Heart problem) Mother      Hypertension Father      No Known Problems Sister      Alcohol abuse Paternal Grandmother      Alcohol abuse Paternal Grandfather      Heart attack Paternal Grandfather      Alcohol abuse Father's Brother      Heart attack Paternal Great-Grandfather          REVIEW OF SYSTEMS  All 10 systems were reviewed and negative except for above.      PHYSICAL EXAM  ENT Physical Exam   GENERAL: Well-nourished and developed, alert and appropriate, no distress, voice [id number]  RESPIRATORY: Breathing quietly, no stridor  HEAD: Normocephalic atraumatic  FACE: Symmetric, no masses or lesions  EYES:  Pupils reactive, sclera clear, external ocular muscles intact, no nystagmus.    EARS:  Pinnae normal. External auditory canals clear and tympanic membranes intact.  NOSE:  No anterior lesions, masses or polyps.  ORAL CAVITY/OROPHARYNX:  Buccal mucosa is moist without lesions or masses, tongue midline and palate elevates symmetrically. Tongue mobility intact.  NECK:  Soft. There is no lymphadenopathy or thyromegaly.    NEUROLOGIC:  Cranial nerves II-XII grossly intact.       Last Recorded Vitals  Temperature 37 °C (98.6 °F), height 1.778 m (5' 10\"), weight 102 kg (225 lb 8 oz).    RESULTS    Patient Reported Outcome Measures  N/A    Laboratory, Radiology, and Pathology  I personally reviewed the following results, with the following interpretation:   Path 3/1/24 - consistent with granuloma  FINAL DIAGNOSIS   A. Left vocal cord lesion posterior, biopsy:   --Squamous mucosa and granulation tissue with acute and chronic inflammatory infiltrate and reactive changes, see note.     Note: " Appropriately controlled GMS stain is negative for fungal organisms.     B. Left vocal cord lesion anterior medial, biopsy:  -- Granulation tissue and fibrin with acute and chronic inflammatory infiltrate       PROCEDURES  Laryngoscopy    Date/Time: 4/3/2024 12:21 PM    Performed by: Marina Weinstein MD  Authorized by: Marina Weinstein MD       Flexible Fiberoptic Laryngoscopy     Patient failed a mirror exam due to limitations of equipment and the need for laryngoscopy to assess laryngeal anatomy and function    PREOPERATIVE DIAGNOSIS: Vocal fold lesion    POSTOPERATIVE DIAGNOSIS: Same    PROCEDURE: Transnasal videolaryngoscopy    ANESTHESIA:  Topical    COMPLICATIONS:  None    SPECIMENS:  None    PROCEDURE IN DETAIL:  The patient was brought into the endoscopy suite, placed in the upright position.  The nasal cavity was topically decongested anesthetized.  The distal chip video laryngoscope was passed through the nasal cavity.  The nasal cavity and nasopharynx were within normal limits except noted below. The following findings on laryngoscopy were noted:         Tongue Base: no masses or lesions          Left vocal fold mobility: mobile         Right vocal fold mobility: mobile         Glottal closure: complete         Laryngeal muscle tension: minimal         Symmetry: symmetric         Vocal fold free edge: no masses or lesions along the membranous vocal folds, small residual granuloma on left vocal fold process         Other abnormalities: per above    The patient tolerated the procedure well.        ----------------------------------------------------------------------  Marina Weinstein MD, MAEd    [facility]  Department of Otolaryngology - Head and Neck Surgery  [facility]    The total time I spent in care of this patient today (excluding time spent on other billable services) is as follows:    Time Spent  Prep time on day of patient  encounter: 5 minutes  Time spent directly with patient, family or caregiver: 20 minutes  Additional Time Spent on Patient Care Activities: 5 minutes  Documentation Time: 10 minutes  Other Time Spent: 0 minutes  Total: 40 minutes

## 2024-04-04 ENCOUNTER — OFFICE VISIT (OUTPATIENT)
Dept: PRIMARY CARE | Facility: CLINIC | Age: 59
End: 2024-04-04
Payer: COMMERCIAL

## 2024-04-04 VITALS
SYSTOLIC BLOOD PRESSURE: 125 MMHG | HEART RATE: 96 BPM | WEIGHT: 227 LBS | HEIGHT: 70 IN | BODY MASS INDEX: 32.5 KG/M2 | DIASTOLIC BLOOD PRESSURE: 82 MMHG

## 2024-04-04 DIAGNOSIS — R73.09 ELEVATED GLUCOSE: ICD-10-CM

## 2024-04-04 DIAGNOSIS — Z12.5 SCREENING FOR PROSTATE CANCER: Primary | ICD-10-CM

## 2024-04-04 DIAGNOSIS — M19.90 ARTHRITIS: ICD-10-CM

## 2024-04-04 DIAGNOSIS — N52.9 ERECTILE DYSFUNCTION, UNSPECIFIED ERECTILE DYSFUNCTION TYPE: ICD-10-CM

## 2024-04-04 DIAGNOSIS — E78.5 HYPERLIPIDEMIA, UNSPECIFIED HYPERLIPIDEMIA TYPE: ICD-10-CM

## 2024-04-04 DIAGNOSIS — G25.81 RESTLESS LEG SYNDROME: ICD-10-CM

## 2024-04-04 PROCEDURE — 99214 OFFICE O/P EST MOD 30 MIN: CPT | Performed by: FAMILY MEDICINE

## 2024-04-04 PROCEDURE — 3079F DIAST BP 80-89 MM HG: CPT | Performed by: FAMILY MEDICINE

## 2024-04-04 PROCEDURE — RXMED WILLOW AMBULATORY MEDICATION CHARGE

## 2024-04-04 PROCEDURE — 3074F SYST BP LT 130 MM HG: CPT | Performed by: FAMILY MEDICINE

## 2024-04-04 RX ORDER — TADALAFIL 10 MG/1
10 TABLET ORAL DAILY PRN
Qty: 12 TABLET | Refills: 3 | Status: SHIPPED | OUTPATIENT
Start: 2024-04-04 | End: 2024-04-04 | Stop reason: SDUPTHER

## 2024-04-04 RX ORDER — MELOXICAM 15 MG/1
15 TABLET ORAL DAILY
Qty: 90 TABLET | Refills: 3 | Status: SHIPPED | OUTPATIENT
Start: 2024-04-04 | End: 2025-04-04

## 2024-04-04 RX ORDER — PRAMIPEXOLE DIHYDROCHLORIDE 0.5 MG/1
0.5 TABLET ORAL 3 TIMES DAILY
Qty: 90 TABLET | Refills: 11 | Status: SHIPPED | OUTPATIENT
Start: 2024-04-04 | End: 2025-04-04

## 2024-04-04 RX ORDER — TAMSULOSIN HYDROCHLORIDE 0.4 MG/1
0.4 CAPSULE ORAL DAILY
Qty: 90 CAPSULE | Refills: 3 | Status: SHIPPED | OUTPATIENT
Start: 2024-04-04 | End: 2025-04-04

## 2024-04-04 RX ORDER — TADALAFIL 10 MG/1
10 TABLET ORAL DAILY PRN
Qty: 90 TABLET | Refills: 3 | Status: SHIPPED | OUTPATIENT
Start: 2024-04-04 | End: 2025-04-04

## 2024-04-05 ENCOUNTER — PHARMACY VISIT (OUTPATIENT)
Dept: PHARMACY | Facility: CLINIC | Age: 59
End: 2024-04-05
Payer: COMMERCIAL

## 2024-04-05 PROCEDURE — RXMED WILLOW AMBULATORY MEDICATION CHARGE

## 2024-04-29 DIAGNOSIS — F31.81 BIPOLAR II DISORDER (MULTI): ICD-10-CM

## 2024-04-29 DIAGNOSIS — I10 PRIMARY HYPERTENSION: ICD-10-CM

## 2024-04-29 PROCEDURE — RXMED WILLOW AMBULATORY MEDICATION CHARGE

## 2024-04-29 RX ORDER — DULOXETIN HYDROCHLORIDE 60 MG/1
60 CAPSULE, DELAYED RELEASE ORAL
Qty: 90 CAPSULE | Refills: 3 | Status: SHIPPED | OUTPATIENT
Start: 2024-04-29 | End: 2025-04-29

## 2024-05-01 ENCOUNTER — PHARMACY VISIT (OUTPATIENT)
Dept: PHARMACY | Facility: CLINIC | Age: 59
End: 2024-05-01
Payer: COMMERCIAL

## 2024-05-06 ENCOUNTER — APPOINTMENT (OUTPATIENT)
Dept: BEHAVIORAL HEALTH | Facility: CLINIC | Age: 59
End: 2024-05-06
Payer: COMMERCIAL

## 2024-05-08 ENCOUNTER — OFFICE VISIT (OUTPATIENT)
Dept: BEHAVIORAL HEALTH | Facility: CLINIC | Age: 59
End: 2024-05-08
Payer: COMMERCIAL

## 2024-05-08 ENCOUNTER — PHARMACY VISIT (OUTPATIENT)
Dept: PHARMACY | Facility: CLINIC | Age: 59
End: 2024-05-08
Payer: COMMERCIAL

## 2024-05-08 VITALS
DIASTOLIC BLOOD PRESSURE: 90 MMHG | HEIGHT: 70 IN | TEMPERATURE: 98.1 F | SYSTOLIC BLOOD PRESSURE: 121 MMHG | HEART RATE: 99 BPM | BODY MASS INDEX: 32.57 KG/M2

## 2024-05-08 DIAGNOSIS — F31.81 BIPOLAR 2 DISORDER (MULTI): ICD-10-CM

## 2024-05-08 PROCEDURE — RXMED WILLOW AMBULATORY MEDICATION CHARGE

## 2024-05-08 PROCEDURE — 99204 OFFICE O/P NEW MOD 45 MIN: CPT | Performed by: PSYCHIATRY & NEUROLOGY

## 2024-05-08 PROCEDURE — 3080F DIAST BP >= 90 MM HG: CPT | Performed by: PSYCHIATRY & NEUROLOGY

## 2024-05-08 PROCEDURE — 3074F SYST BP LT 130 MM HG: CPT | Performed by: PSYCHIATRY & NEUROLOGY

## 2024-05-08 RX ORDER — LAMOTRIGINE 25 MG/1
25 TABLET ORAL DAILY
Qty: 30 TABLET | Refills: 0 | Status: SHIPPED | OUTPATIENT
Start: 2024-05-08 | End: 2024-06-03 | Stop reason: SDUPTHER

## 2024-05-08 NOTE — PROGRESS NOTES
"Outpatient Psychiatry    Subjective   Martín Mccormick, a 58 y.o. male, presenting to Psychiatry for evaluation.  Patient is referred by Vanessa Hammer DO.         Chief Complaint: \"I am doing okay\"    HPI:      Hasn't seen anyone for two years but had been doing well on both medications prescribed   by PCP.  Recently he has been under tremendous  stress because his mom fell and broke a   hip and had surgery and is now in the hospital.  She is getting OT and PT.  His dad cannot   be left alone as he \"sundowns\" so he the family is having to be there 24/7.  His parents are   94 and 95 and are very close.  He is very concerned about what will happen if his mother    dies and feels his dad will no longer have any will to live.    Patient reports he retired two years ago.  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    Patient has been cancer free for 14 years.   He is on BP, cholesterol meds and ASA daily.    He is having struggles with his 60 year old sister who is not talking to him.  She has been   causing problems in the hospital  and the staff had  to call a code violet.        Has been on  Cymbalta 90 and  Lamictal 200 mg PO daily.  He said the  Lamictal has  helped   a great deal with his anger and mood swings but he  feels he is regressing due to the  stress   he  is under  with  now.  He has episodes when he feels \"ansy\" and  his HR increases significantly.     Patient denies SI, HI, manic or psychotic symptoms.      Psychiatric Review Of Systems:  Depressive Symptoms: anhedonia, concentration, energy, and interest  Manic Symptoms: negative  Anxiety Symptoms: General Anxiety Disorder (MILDRED)MILDRED Behaviors: excessive anxiety/worry, difficulty concentrating, easily fatigued, irritability, and restlessness  Psychotic Symptoms: negative  Other Symptoms:    Current Medications:    Current Outpatient Medications:     acetaminophen (Tylenol 8 HOUR) 650 " mg ER tablet, Take 1 tablet (650 mg) by mouth every 8 hours if needed for mild pain (1 - 3). Do not crush, chew, or split., Disp: , Rfl:     ascorbic acid (Vitamin C) 250 mg tablet, Take 2 tablets (500 mg) by mouth once daily., Disp: , Rfl:     aspirin 81 mg EC tablet, TAKE 1 TABLET BY MOUTH ONCE DAILY AS DIRECTED., Disp: 30 tablet, Rfl: 11    chlorthalidone (Hygroton) 25 mg tablet, TAKE 1 TABLET BY MOUTH ONCE DAILY IN THE MORNING, Disp: 30 tablet, Rfl: 11    cholecalciferol (Vitamin D-3) 25 MCG (1000 UT) capsule, Take by mouth., Disp: , Rfl:     DAILY MULTI-VITAMIN ORAL, Take by mouth., Disp: , Rfl:     DULoxetine (Cymbalta) 30 mg DR capsule, TAKE 1 CAPSULE (30 MG) BY MOUTH ONCE DAILY., Disp: 90 capsule, Rfl: 3    DULoxetine (Cymbalta) 60 mg DR capsule, TAKE 1 CAPSULE (60 MG) BY MOUTH ONCE DAILY., Disp: 90 capsule, Rfl: 3    fluticasone (Flonase) 50 mcg/actuation nasal spray, Administer 2 sprays into each nostril 2 times a day. Shake gently. Before first use, prime pump. After use, clean tip and replace cap., Disp: 16 g, Rfl: 11    ibuprofen 100 mg/5 mL suspension, Take 10 mL (200 mg) by mouth every 6 hours., Disp: , Rfl:     lamoTRIgine (LaMICtal) 200 mg tablet, TAKE 1 TABLET (200 MG) BY MOUTH ONCE DAILY., Disp: 90 tablet, Rfl: 0    meloxicam (Mobic) 15 mg tablet, Take 1 tablet (15 mg) by mouth once daily., Disp: 90 tablet, Rfl: 3    methylPREDNISolone (Medrol Dospak) 4 mg tablets, Take as directed on package. Take 6 tablets the first day then decrease by 1 tablet daily until gone. 6,5,4,3,2,1 then done. Take with food, Disp: 21 tablet, Rfl: 0    metoprolol succinate XL (Toprol XL) 100 mg 24 hr tablet, Take 1 tablet (100 mg) by mouth once daily. Do not crush or chew., Disp: 30 tablet, Rfl: 11    omeprazole (PriLOSEC) 20 mg DR capsule, Take 1 capsule (20 mg) by mouth 2 times a day before meals., Disp: 60 capsule, Rfl: 1    pramipexole (Mirapex) 0.5 mg tablet, Take 1 tablet (0.5 mg) by mouth 3 times a day., Disp: 90  tablet, Rfl: 11    rosuvastatin (Crestor) 40 mg tablet, TAKE 1 TABLET BY MOUTH ONCE DAILY, Disp: 90 tablet, Rfl: 3    tadalafil (Cialis) 10 mg tablet, Take 1 tablet (10 mg) by mouth once daily as needed for erectile dysfunction., Disp: 90 tablet, Rfl: 3    tamsulosin (Flomax) 0.4 mg 24 hr capsule, Take 1 capsule (0.4 mg) by mouth once daily., Disp: 90 capsule, Rfl: 3    Medical History:  Past Medical History:   Diagnosis Date    Bipolar II disorder (Multi)     Cardiology follow-up encounter 12/29/2022    Palmer MINISTERIO Юлияkaity, DO    Coronary artery disease     s/p CABG X2    Erectile dysfunction     Generalized anxiety disorder     Hyperlipidemia     Hypertension     Lesion of vocal fold     Lung nodules     Obesity     Other chest pain     Personal history of non-Hodgkin lymphomas     Restless legs syndrome     Social phobia, unspecified     Social anxiety disorder    Vitamin D deficiency        Past Psychiatric History:   Diagnoses: MILDRED, BAD II  Previous Psychiatrist:  none   Therapy/past treatments:  did talk therapy in the past  Current psychiatric medications:  Lamictal and Cymbalta  Past psychiatric medications:  tried SSRIs, wellbutrin, Prozac made him tired,   Hospitalizations:  none   Suicide attempts:  SI, never   Family psychiatric history:  unknown    Social History:   Currently lives: with wife  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[employer], electrical trade and heating/AC certification   : 2003;   2021   History of Learning Problem:   Work/Finances:  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  Use of alcohol: patient reports recent increase in ETOH intake- has some in the  afternoon   Use of caffeine:  unknown  Use of other substances: denies  Legal consequences of substance use: n/a  Substance use disorder treatment: n/a    Record Review: brief     Medical Review Of Systems:  Pertinent items are noted in HPI.    Objective   Mental Status Exam  Appearance: dressed in jeans, good g/h  Attitude: Friendly. Calm, cooperative, and engaged in conversation.  Behavior: Appropriate eye contact.   Motor Activity: No psychomotor agitation or retardation. No abnormal movements, tremors or tics. No evidence of extrapyramidal symptoms or tardive dyskinesia.  Speech: Regular rate, rhythm, volume. Spontaneous, no pressured speech.  Mood: \"ok\"  Affect: Euthymic, full range, mood congruent.  Thought Process: Linear, logical, and goal-directed. No loose associations or gross thought disorganization.  Thought Content: Denied current suicidal ideation or thoughts of harm to self, denied homicidal ideation or thoughts of harm to others. No delusional thinking elicited. No perseverations or obsessions identified.   Perception: Did not endorse auditory or visual hallucinations, did not appear to be responding to hallucinatory stimuli.   Cognition: Alert, oriented x3. Preserved attention span and concentration, recent and remote memory. Adequate fund of knowledge. No deficits in language.   Insight: Fair, in regards to understanding mental health condition  Judgement: Fair      Vitals:    05/08/24 0938   BP: 121/90   Pulse: 99   Temp: 36.7 °C (98.1 °F)      Wt: 227 lbs    BMI:  32.57    Falls Risk:  n/a    Risk Assessment:  Risk of harm to self: Low Risk -- Risk factors include: Age, Alcohol or other substance abuse, Gender, Loss (relational, social, occupational, financial) , Medical illness comorbidity , and Psychosocial stressors including mom broke hip, problems with sister  " Protective factors include:Denies current suicidal ideation, Denies history of suicide attempts , Future-oriented talk , Willingness to seek help and support , Skills in problem solving, conflict resolution, and nonviolent handling of disputes, History of adhering to treatment recommendations and/or prescribed medication regimen , Support through ongoing medical and mental healthcare relationships , Current/history of good response to treatment/meds , Interpersonal relationships and supports, e.g., family, friends, peers, community , and Restricted access to firearms or other lethal means of suicide     Risk of harm to others: Low Risk - Risk factors include: No significant risk factors identified on screening. Protective factors include: Lack of known history of harm to others , Lack of known history of violent ideation , Lack of known access to firearms , Sense of community, availability/access to resources and support , Sense of optimism, hope , Affect regulation , Sense of self-efficacy, internal locus of control , and Positive, pro-social family/peer network          DXS:     BAD II     Assessment:   Patient is a 58 year old  male  with BAD II  on Lamictal and Cymbalta.  Patient has been feeling that  he is regressing in terms of his  mental  health and  he would like to prevent this from happening.  Currently under a great deal  of  stress with medical  issues and  mom having recently fallen  and broken a  hip.  Very concerned about  his father who needs  constant supervision.  Having difficulties with his sister.  Discussed increasing Lamictal to 225 mg PO daily to which he  is agreeable.      Patient denies SI, HI, manic or psychotic symptoms.  No side effects reported.       Plan/Recommendations:  Medications: increase Lamictal to 225 mg PO daily; continue Cymbalta 90 mg PO daily  Orders: none  Follow up: 2-3 weeks  Call  Psychiatry at [phone] with issues.  For [facility] residents, [place] is a 24/7 hotline you can call for assistance [[phone]]. Please call [phone] or go to your closest Emergency Room if you feel unsafe. This includes thoughts of hurting yourself or anyone else, or having other troubles such as hearing voices, seeing visions, or having new and scary thoughts about the people around you.    Review with patient: Treatment plan reviewed with the patient.  Medication risks/benefit reviewed with the patient          Maria Teresa Martinez MD

## 2024-05-15 ENCOUNTER — PHARMACY VISIT (OUTPATIENT)
Dept: PHARMACY | Facility: CLINIC | Age: 59
End: 2024-05-15
Payer: COMMERCIAL

## 2024-05-15 DIAGNOSIS — J38.3 LESION OF VOCAL FOLD: ICD-10-CM

## 2024-05-15 PROCEDURE — RXMED WILLOW AMBULATORY MEDICATION CHARGE

## 2024-05-15 RX ORDER — OMEPRAZOLE 20 MG/1
20 CAPSULE, DELAYED RELEASE ORAL
Qty: 60 CAPSULE | Refills: 1 | Status: SHIPPED | OUTPATIENT
Start: 2024-05-15 | End: 2024-07-14

## 2024-05-28 DIAGNOSIS — I10 HYPERTENSION: Primary | ICD-10-CM

## 2024-05-28 PROCEDURE — RXMED WILLOW AMBULATORY MEDICATION CHARGE

## 2024-05-28 RX ORDER — CHLORTHALIDONE 25 MG/1
25 TABLET ORAL
Qty: 90 TABLET | Refills: 3 | Status: SHIPPED | OUTPATIENT
Start: 2024-05-28 | End: 2025-05-28

## 2024-05-29 ENCOUNTER — TELEMEDICINE (OUTPATIENT)
Dept: BEHAVIORAL HEALTH | Facility: CLINIC | Age: 59
End: 2024-05-29
Payer: COMMERCIAL

## 2024-05-29 DIAGNOSIS — F31.81 BIPOLAR 2 DISORDER (MULTI): ICD-10-CM

## 2024-05-29 PROCEDURE — 99214 OFFICE O/P EST MOD 30 MIN: CPT | Performed by: PSYCHIATRY & NEUROLOGY

## 2024-05-29 PROCEDURE — RXMED WILLOW AMBULATORY MEDICATION CHARGE

## 2024-05-29 RX ORDER — LAMOTRIGINE 250 MG/1
250 TABLET, EXTENDED RELEASE ORAL DAILY
Qty: 30 TABLET | Refills: 2 | Status: SHIPPED | OUTPATIENT
Start: 2024-05-29 | End: 2024-08-27

## 2024-05-29 NOTE — PROGRESS NOTES
"  Subjective   Martín Mccormick, a 58 y.o. male, presenting to Psychiatry for evaluation.  Patient is referred by Vanessa Hammer DO.          Patient reports he is not noticing any difference on the increased dose of the Lamictal    He has been having significant dry mouth since increasing the Lamictal     Patient reports that he has been feeling like he is constantly doing something wrong or that  someone is mad at him    His Dad has been staying with him for a few days    Mom is slowly recovering - has a bed sore   Is at home - has OT twice a week, cooking    Sister not  communicating with him    Leaving for [place] on a trip that has been planned for a while and his sister is angry with him     The situation with his sister is getting worse    May take all of the Cymbalta at one time instead of dividing it in two doses    Hasn't seen anyone for two years but had been doing well on both medications prescribed   by PCP.  Recently he has been under tremendous  stress because his mom fell and broke a   hip and had surgery and is now in the hospital.  She is getting OT and PT.  His dad cannot   be left alone as he \"sundowns\" so he the family is having to be there 24/7.  His parents are   94 and 95 and are very close.  He is very concerned about what will happen if his mother    dies and feels his dad will no longer have any will to live.     Patient reports he retired two years ago.  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    Patient has been cancer free for 14 years.   He is on BP, cholesterol meds and ASA daily.     He is having struggles with his 60 year old sister who is not talking to him.  She has been   causing problems in the hospital  and the staff had  to call a code violet.         Has been on  Cymbalta 90 and  Lamictal 200 mg PO daily.  He said the  Lamictal has  helped   a great deal with his anger and mood swings but he  feels he is " "regressing due to the  stress   he  is under  with  now.  He has episodes when he feels \"ansy\" and  his HR increases significantly.      Patient denies SI, HI, manic or psychotic symptoms.        Psychiatric Review Of Systems:  Depressive Symptoms: anhedonia, concentration, energy, and interest  Manic Symptoms: negative  Anxiety Symptoms: General Anxiety Disorder (MILDRED)MILDRED Behaviors: excessive anxiety/worry, difficulty concentrating, easily fatigued, irritability, and restlessness  Psychotic Symptoms: negative  Other Symptoms:     Current Medications:     Current Outpatient Medications:     acetaminophen (Tylenol 8 HOUR) 650 mg ER tablet, Take 1 tablet (650 mg) by mouth every 8 hours if needed for mild pain (1 - 3). Do not crush, chew, or split., Disp: , Rfl:     ascorbic acid (Vitamin C) 250 mg tablet, Take 2 tablets (500 mg) by mouth once daily., Disp: , Rfl:     aspirin 81 mg EC tablet, TAKE 1 TABLET BY MOUTH ONCE DAILY AS DIRECTED., Disp: 30 tablet, Rfl: 11    chlorthalidone (Hygroton) 25 mg tablet, TAKE 1 TABLET BY MOUTH ONCE DAILY IN THE MORNING, Disp: 30 tablet, Rfl: 11    cholecalciferol (Vitamin D-3) 25 MCG (1000 UT) capsule, Take by mouth., Disp: , Rfl:     DAILY MULTI-VITAMIN ORAL, Take by mouth., Disp: , Rfl:     DULoxetine (Cymbalta) 30 mg DR capsule, TAKE 1 CAPSULE (30 MG) BY MOUTH ONCE DAILY., Disp: 90 capsule, Rfl: 3    DULoxetine (Cymbalta) 60 mg DR capsule, TAKE 1 CAPSULE (60 MG) BY MOUTH ONCE DAILY., Disp: 90 capsule, Rfl: 3    fluticasone (Flonase) 50 mcg/actuation nasal spray, Administer 2 sprays into each nostril 2 times a day. Shake gently. Before first use, prime pump. After use, clean tip and replace cap., Disp: 16 g, Rfl: 11    ibuprofen 100 mg/5 mL suspension, Take 10 mL (200 mg) by mouth every 6 hours., Disp: , Rfl:     lamoTRIgine (LaMICtal) 200 mg tablet, TAKE 1 TABLET (200 MG) BY MOUTH ONCE DAILY., Disp: 90 tablet, Rfl: 0    meloxicam (Mobic) 15 mg tablet, Take 1 tablet (15 mg) by mouth " once daily., Disp: 90 tablet, Rfl: 3    methylPREDNISolone (Medrol Dospak) 4 mg tablets, Take as directed on package. Take 6 tablets the first day then decrease by 1 tablet daily until gone. 6,5,4,3,2,1 then done. Take with food, Disp: 21 tablet, Rfl: 0    metoprolol succinate XL (Toprol XL) 100 mg 24 hr tablet, Take 1 tablet (100 mg) by mouth once daily. Do not crush or chew., Disp: 30 tablet, Rfl: 11    omeprazole (PriLOSEC) 20 mg DR capsule, Take 1 capsule (20 mg) by mouth 2 times a day before meals., Disp: 60 capsule, Rfl: 1    pramipexole (Mirapex) 0.5 mg tablet, Take 1 tablet (0.5 mg) by mouth 3 times a day., Disp: 90 tablet, Rfl: 11    rosuvastatin (Crestor) 40 mg tablet, TAKE 1 TABLET BY MOUTH ONCE DAILY, Disp: 90 tablet, Rfl: 3    tadalafil (Cialis) 10 mg tablet, Take 1 tablet (10 mg) by mouth once daily as needed for erectile dysfunction., Disp: 90 tablet, Rfl: 3    tamsulosin (Flomax) 0.4 mg 24 hr capsule, Take 1 capsule (0.4 mg) by mouth once daily., Disp: 90 capsule, Rfl: 3     Medical History:  Medical History        Past Medical History:   Diagnosis Date    Bipolar II disorder (Multi)      Cardiology follow-up encounter 12/29/2022     Palmer Castellanos,     Coronary artery disease       s/p CABG X2    Erectile dysfunction      Generalized anxiety disorder      Hyperlipidemia      Hypertension      Lesion of vocal fold      Lung nodules      Obesity      Other chest pain      Personal history of non-Hodgkin lymphomas      Restless legs syndrome      Social phobia, unspecified       Social anxiety disorder    Vitamin D deficiency              Past Psychiatric History:   Diagnoses: MILDRED, BAD II  Previous Psychiatrist:  none   Therapy/past treatments:  did talk therapy in the past but it did not work   Current psychiatric medications:  Lamictal and Cymbalta  Past psychiatric medications:  tried SSRIs, wellbutrin, Prozac made him tired,   Hospitalizations:  none   Suicide attempts:  SI, never   Family  "psychiatric history:  unknown     Social History:   Currently lives: with wife  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[employer], electrical trade and heating/AC certification   : 2003;   2021   History of Learning Problem:   Work/Finances:  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  Use of alcohol: patient reports recent increase in ETOH intake- has some in the  afternoon   Use of caffeine:  unknown  Use of other substances: denies  Legal consequences of substance use: n/a  Substance use disorder treatment: n/a     Record Review: brief     Medical Review Of Systems:  Pertinent items are noted in HPI.           Objective   Mental Status Exam  Appearance: dressed in jeans, good g/h  Attitude: Friendly. Calm, cooperative, and engaged in conversation.  Behavior: Appropriate eye contact.   Motor Activity: No psychomotor agitation or retardation. No abnormal movements, tremors or tics. No evidence of extrapyramidal symptoms or tardive dyskinesia.  Speech: Regular rate, rhythm, volume. Spontaneous, no pressured speech.  Mood: \"ok\"  Affect: Euthymic, full range, mood congruent.  Thought Process: Linear, logical, and goal-directed. No loose associations or gross thought disorganization.  Thought Content: Denied current suicidal ideation or thoughts of harm to self, denied homicidal ideation or thoughts of harm to others. No delusional thinking elicited. No perseverations or obsessions identified.   Perception: Did not endorse auditory or visual hallucinations, did not appear to " be responding to hallucinatory stimuli.   Cognition: Alert, oriented x3. Preserved attention span and concentration, recent and remote memory. Adequate fund of knowledge. No deficits in language.   Insight: Fair, in regards to understanding mental health condition  Judgement: Fair            Vitals:     05/08/24 0938   BP: 121/90   Pulse: 99   Temp: 36.7 °C (98.1 °F)      Wt: 227 lbs     BMI:  32.57     Falls Risk:  n/a     Risk Assessment:  Risk of harm to self: Low Risk -- Risk factors include: Age, Alcohol or other substance abuse, Gender, Loss (relational, social, occupational, financial) , Medical illness comorbidity , and Psychosocial stressors including mom broke hip, problems with sister  Protective factors include:Denies current suicidal ideation, Denies history of suicide attempts , Future-oriented talk , Willingness to seek help and support , Skills in problem solving, conflict resolution, and nonviolent handling of disputes, History of adhering to treatment recommendations and/or prescribed medication regimen , Support through ongoing medical and mental healthcare relationships , Current/history of good response to treatment/meds , Interpersonal relationships and supports, e.g., family, friends, peers, community , and Restricted access to firearms or other lethal means of suicide      Risk of harm to others: Low Risk - Risk factors include: No significant risk factors identified on screening. Protective factors include: Lack of known history of harm to others , Lack of known history of violent ideation , Lack of known access to firearms , Sense of community, availability/access to resources and support , Sense of optimism, hope , Affect regulation , Sense of self-efficacy, internal locus of control , and Positive, pro-social family/peer network            DXS:      BAD II      Assessment:   Patient is a 58 year old  male  with BAD II  on Lamictal and Cymbalta.  Patient has been feeling that  he is  regressing in terms of his  mental  health and  he would like to prevent this from happening.  Currently under a great deal  of  stress with medical  issues and  mom having recently fallen  and broken a  hip.  Very concerned about  his father who needs  constant supervision.  Having difficulties with his sister.  Does not feel increased dose of  Lamictal to 225 mg PO daily has helped.  Advise patient to take all of his Cymbalta in the morning and will increase the Lamictal to 250 mg PO daily.         Patient denies SI, HI, manic or psychotic symptoms.  No side effects reported.        Plan/Recommendations:  Medications: increase Lamictal to 250 mg PO daily; continue Cymbalta 90 mg PO daily  Orders: none  Follow up: 2-3 weeks  Call  Psychiatry at [phone] with issues.  For [facility] residents, [employer] [place] is a 24/7 hotline you can call for assistance [[phone]]. Please call [phone] or go to your closest Emergency Room if you feel unsafe. This includes thoughts of hurting yourself or anyone else, or having other troubles such as hearing voices, seeing visions, or having new and scary thoughts about the people around you.     Review with patient: Treatment plan reviewed with the patient.  Medication risks/benefit reviewed with the patient

## 2024-05-30 ENCOUNTER — PHARMACY VISIT (OUTPATIENT)
Dept: PHARMACY | Facility: CLINIC | Age: 59
End: 2024-05-30
Payer: COMMERCIAL

## 2024-05-30 PROCEDURE — RXMED WILLOW AMBULATORY MEDICATION CHARGE

## 2024-05-31 DIAGNOSIS — F31.81 BIPOLAR 2 DISORDER (MULTI): ICD-10-CM

## 2024-05-31 RX ORDER — LAMOTRIGINE 25 MG/1
25 TABLET ORAL DAILY
Qty: 30 TABLET | Refills: 0 | Status: CANCELLED | OUTPATIENT
Start: 2024-05-31 | End: 2024-06-30

## 2024-06-03 DIAGNOSIS — F31.81 BIPOLAR 2 DISORDER (MULTI): ICD-10-CM

## 2024-06-03 RX ORDER — LAMOTRIGINE 25 MG/1
25 TABLET ORAL DAILY
Qty: 30 TABLET | Refills: 2 | Status: SHIPPED | OUTPATIENT
Start: 2024-06-03 | End: 2024-09-01

## 2024-06-04 ENCOUNTER — APPOINTMENT (OUTPATIENT)
Dept: BEHAVIORAL HEALTH | Facility: CLINIC | Age: 59
End: 2024-06-04
Payer: COMMERCIAL

## 2024-06-24 PROCEDURE — RXMED WILLOW AMBULATORY MEDICATION CHARGE

## 2024-06-25 ENCOUNTER — PHARMACY VISIT (OUTPATIENT)
Dept: PHARMACY | Facility: CLINIC | Age: 59
End: 2024-06-25
Payer: COMMERCIAL

## 2024-06-25 PROCEDURE — RXMED WILLOW AMBULATORY MEDICATION CHARGE

## 2024-06-26 ENCOUNTER — APPOINTMENT (OUTPATIENT)
Dept: BEHAVIORAL HEALTH | Facility: CLINIC | Age: 59
End: 2024-06-26
Payer: COMMERCIAL

## 2024-06-26 DIAGNOSIS — F31.81 BIPOLAR 2 DISORDER (MULTI): ICD-10-CM

## 2024-06-26 PROCEDURE — 99214 OFFICE O/P EST MOD 30 MIN: CPT | Performed by: PSYCHIATRY & NEUROLOGY

## 2024-06-26 NOTE — PROGRESS NOTES
"  Subjective   Martín Mccormick, a 58 y.o. male, presenting to Psychiatry for follow up.  Patient is referred by Vanessa Hammer DO.         Virtual or Telephone Consent    An interactive audio and video telecommunication system which permits real time communications between the patient (at the originating site) and provider (at the distant site) was utilized to provide this telehealth service.   Verbal consent was requested and obtained from Martín Mccormick on this date, 06/26/24 for a telehealth visit.        The patient reports he is just \"okay\"    The patient feels that the increase in Lamictal works for a few hours but does not work for the whole day. He does feel that the   increase in the morning has helped his mood but then it seems to wear off and only lasts for for a few hours.  Patient   feels that he does not have any energy and is not motivated to do anything.    The patient continues to have insomnia but this has been a chronic problem.  He is able to fall asleep but then wakes up   and cannot fall back to sleep.    The situation with his parents is the same.  He continues continues to struggle with his mother who has always been very   hard on him.  She has discouraged him his whole life.  For example, she cried for 2 days when he got an earring at the age of 20   and she is always hated his tattoos.  She seemed to be okay with the sister's tattoos.    Things are the same with his sister.      The patient has been on Cymbalta for over 5 years.  He is interested in the possibility of getting off of the Cymbalta   and starting Wellbutrin instead. In addition, we discussed increasing the Lamictal as it has given him some benefits   for a few hours in the morning.    Patient said he is always hard on himself and questions every thing he does. He constantly feels as if he is doing something   wrong or that someone is mad at him.  He tried counseling in the past and did not feel it was helpful.  His wife " is very   supportive and positive.      Patient is no longer having dry mouth and thinks it from an herb his PCP recommended.  He does not remember the name   of the herb.     Patient denies SI, HI, manic or psychotic symptoms.        Psychiatric Review Of Systems:  Depressive Symptoms: anhedonia, concentration, energy, and interest  Manic Symptoms: negative  Anxiety Symptoms: General Anxiety Disorder (MILDRED)MILDRED Behaviors: excessive anxiety/worry, difficulty concentrating, easily fatigued, irritability, and restlessness  Psychotic Symptoms: negative  Other Symptoms:       Current Outpatient Medications:     acetaminophen (Tylenol 8 HOUR) 650 mg ER tablet, Take 1 tablet (650 mg) by mouth every 8 hours if needed for mild pain (1 - 3). Do not crush, chew, or split., Disp: , Rfl:     ascorbic acid (Vitamin C) 250 mg tablet, Take 2 tablets (500 mg) by mouth once daily., Disp: , Rfl:     aspirin 81 mg EC tablet, TAKE 1 TABLET BY MOUTH ONCE DAILY AS DIRECTED., Disp: 30 tablet, Rfl: 11    chlorthalidone (Hygroton) 25 mg tablet, TAKE 1 TABLET BY MOUTH ONCE DAILY IN THE MORNING, Disp: 30 tablet, Rfl: 11    cholecalciferol (Vitamin D-3) 25 MCG (1000 UT) capsule, Take by mouth., Disp: , Rfl:     DAILY MULTI-VITAMIN ORAL, Take by mouth., Disp: , Rfl:     DULoxetine (Cymbalta) 30 mg DR capsule, TAKE 1 CAPSULE (30 MG) BY MOUTH ONCE DAILY., Disp: 90 capsule, Rfl: 3    DULoxetine (Cymbalta) 60 mg DR capsule, TAKE 1 CAPSULE (60 MG) BY MOUTH ONCE DAILY., Disp: 90 capsule, Rfl: 3    fluticasone (Flonase) 50 mcg/actuation nasal spray, Administer 2 sprays into each nostril 2 times a day. Shake gently. Before first use, prime pump. After use, clean tip and replace cap., Disp: 16 g, Rfl: 11    ibuprofen 100 mg/5 mL suspension, Take 10 mL (200 mg) by mouth every 6 hours., Disp: , Rfl:     lamoTRIgine (LaMICtal) 200 mg tablet, TAKE 1 TABLET (200 MG) BY MOUTH ONCE DAILY., Disp: 90 tablet, Rfl: 0    meloxicam (Mobic) 15 mg tablet, Take 1 tablet  (15 mg) by mouth once daily., Disp: 90 tablet, Rfl: 3    methylPREDNISolone (Medrol Dospak) 4 mg tablets, Take as directed on package. Take 6 tablets the first day then decrease by 1 tablet daily until gone. 6,5,4,3,2,1 then done. Take with food, Disp: 21 tablet, Rfl: 0    metoprolol succinate XL (Toprol XL) 100 mg 24 hr tablet, Take 1 tablet (100 mg) by mouth once daily. Do not crush or chew., Disp: 30 tablet, Rfl: 11    omeprazole (PriLOSEC) 20 mg DR capsule, Take 1 capsule (20 mg) by mouth 2 times a day before meals., Disp: 60 capsule, Rfl: 1    pramipexole (Mirapex) 0.5 mg tablet, Take 1 tablet (0.5 mg) by mouth 3 times a day., Disp: 90 tablet, Rfl: 11    rosuvastatin (Crestor) 40 mg tablet, TAKE 1 TABLET BY MOUTH ONCE DAILY, Disp: 90 tablet, Rfl: 3    tadalafil (Cialis) 10 mg tablet, Take 1 tablet (10 mg) by mouth once daily as needed for erectile dysfunction., Disp: 90 tablet, Rfl: 3    tamsulosin (Flomax) 0.4 mg 24 hr capsule, Take 1 capsule (0.4 mg) by mouth once daily., Disp: 90 capsule, Rfl: 3     Medical History:  Medical History           Past Medical History:   Diagnosis Date    Bipolar II disorder (Multi)      Cardiology follow-up encounter 12/29/2022     Palmer Castellanos,     Coronary artery disease       s/p CABG X2    Erectile dysfunction      Generalized anxiety disorder      Hyperlipidemia      Hypertension      Lesion of vocal fold      Lung nodules      Obesity      Other chest pain      Personal history of non-Hodgkin lymphomas      Restless legs syndrome      Social phobia, unspecified       Social anxiety disorder    Vitamin D deficiency              Past Psychiatric History:   Diagnoses: MILDRED, BAD II  Previous Psychiatrist:  none   Therapy/past treatments:  did talk therapy in the past but it did not work   Current psychiatric medications:  Lamictal and Cymbalta  Past psychiatric medications:  tried SSRIs, wellbutrin, Prozac made him tired,   Hospitalizations:  none   Suicide attempts:   "SI, never   Family psychiatric history:  unknown     Social History:   Currently lives: with wife  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, electrical trade and heating/AC certification   : 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  Use of alcohol: patient reports recent increase in ETOH intake- has some in the  afternoon   Use of caffeine:  unknown  Use of other substances: denies  Legal consequences of substance use: n/a  Substance use disorder treatment: n/a     Record Review: brief     Medical Review Of Systems:  Pertinent items are noted in HPI.           Objective   Mental Status Exam  Appearance: dressed in jeans, good g/h  Attitude: Friendly. Calm, cooperative, and engaged in conversation.  Behavior: Appropriate eye contact.   Motor Activity: No psychomotor agitation or retardation. No abnormal movements, tremors or tics. No evidence of extrapyramidal symptoms or tardive dyskinesia.  Speech: Regular rate, rhythm, volume. Spontaneous, no pressured speech.  Mood: \"ok\"  Affect: Euthymic, full range, mood congruent.  Thought Process: Linear, logical, and goal-directed. No loose associations or gross thought disorganization.  Thought Content: Denied current suicidal ideation or thoughts of harm to self, denied homicidal ideation or thoughts of harm to others. No delusional thinking elicited. No perseverations or obsessions identified.   Perception: Did not endorse auditory or visual " hallucinations, did not appear to be responding to hallucinatory stimuli.   Cognition: Alert, oriented x3. Preserved attention span and concentration, recent and remote memory. Adequate fund of knowledge. No deficits in language.   Insight: Fair, in regards to understanding mental health condition  Judgement: Fair              Vitals:     05/08/24 0938   BP: 121/90   Pulse: 99   Temp: 36.7 °C (98.1 °F)      Wt: 227 lbs     BMI:  32.57     Falls Risk:  n/a     Risk Assessment:  Risk of harm to self: Low Risk -- Risk factors include: Age, Alcohol or other substance abuse, Gender, Loss (relational, social, occupational, financial) , Medical illness comorbidity , and Psychosocial stressors including mom broke hip, problems with sister  Protective factors include:Denies current suicidal ideation, Denies history of suicide attempts , Future-oriented talk , Willingness to seek help and support , Skills in problem solving, conflict resolution, and nonviolent handling of disputes, History of adhering to treatment recommendations and/or prescribed medication regimen , Support through ongoing medical and mental healthcare relationships , Current/history of good response to treatment/meds , Interpersonal relationships and supports, e.g., family, friends, peers, community , and Restricted access to firearms or other lethal means of suicide      Risk of harm to others: Low Risk - Risk factors include: No significant risk factors identified on screening. Protective factors include: Lack of known history of harm to others , Lack of known history of violent ideation , Lack of known access to firearms , Sense of community, availability/access to resources and support , Sense of optimism, hope , Affect regulation , Sense of self-efficacy, internal locus of control , and Positive, pro-social family/peer network            DXS:      BAD II      Assessment:   Patient is a 58 year old  male  with BAD II  on Lamictal and Cymbalta.  Patient  feels a little better for a few hours in the morning on the increased dose of Lamictal but then it seems to wear off.   He feels as if he  has not energy or motivation.  Continues to struggle with issues with mom and sister.  Patient is agreeable to increasing Lamictal to 275 mg PO daily and decrease Cymbalta to 60 mg PO daily with the plan to titrate off Cymbalta and on to Wellbutrin.     Discussed risks, benefits and side effects.      Patient denies SI, HI, manic or psychotic symptoms.  No side effects reported.        Plan/Recommendations:  Medications: increase Lamictal to 250 mg PO daily; decrease Cymbalta to 60 mg PO daily  Orders: none  Follow up: 2-3 weeks  Call  Psychiatry at [phone] with issues.  For [facility] residents, [facility] is a 24/7 hotline you can call for assistance [[phone]]. Please call [phone] or go to your closest Emergency Room if you feel unsafe. This includes thoughts of hurting yourself or anyone else, or having other troubles such as hearing voices, seeing visions, or having new and scary thoughts about the people around you.     Review with patient: Treatment plan reviewed with the patient.  Medication risks/benefit reviewed with the patient

## 2024-07-03 ENCOUNTER — PHARMACY VISIT (OUTPATIENT)
Dept: PHARMACY | Facility: CLINIC | Age: 59
End: 2024-07-03
Payer: COMMERCIAL

## 2024-07-03 DIAGNOSIS — F31.81 BIPOLAR 2 DISORDER (MULTI): ICD-10-CM

## 2024-07-03 PROCEDURE — RXMED WILLOW AMBULATORY MEDICATION CHARGE

## 2024-07-03 RX ORDER — LAMOTRIGINE 25 MG/1
25 TABLET ORAL DAILY
Qty: 30 TABLET | Refills: 0 | Status: SHIPPED | OUTPATIENT
Start: 2024-07-03 | End: 2024-08-02

## 2024-07-08 DIAGNOSIS — J38.3 LESION OF VOCAL FOLD: ICD-10-CM

## 2024-07-08 PROCEDURE — RXMED WILLOW AMBULATORY MEDICATION CHARGE

## 2024-07-08 RX ORDER — OMEPRAZOLE 20 MG/1
20 CAPSULE, DELAYED RELEASE ORAL
Qty: 60 CAPSULE | Refills: 1 | Status: SHIPPED | OUTPATIENT
Start: 2024-07-08 | End: 2024-09-07

## 2024-07-09 ENCOUNTER — PHARMACY VISIT (OUTPATIENT)
Dept: PHARMACY | Facility: CLINIC | Age: 59
End: 2024-07-09
Payer: COMMERCIAL

## 2024-07-17 ENCOUNTER — PHARMACY VISIT (OUTPATIENT)
Dept: PHARMACY | Facility: CLINIC | Age: 59
End: 2024-07-17
Payer: COMMERCIAL

## 2024-07-17 ENCOUNTER — APPOINTMENT (OUTPATIENT)
Dept: BEHAVIORAL HEALTH | Facility: CLINIC | Age: 59
End: 2024-07-17
Payer: COMMERCIAL

## 2024-07-17 DIAGNOSIS — F31.81 BIPOLAR 2 DISORDER (MULTI): ICD-10-CM

## 2024-07-17 PROCEDURE — 99214 OFFICE O/P EST MOD 30 MIN: CPT | Performed by: PSYCHIATRY & NEUROLOGY

## 2024-07-17 PROCEDURE — RXMED WILLOW AMBULATORY MEDICATION CHARGE

## 2024-07-17 RX ORDER — LAMOTRIGINE 100 MG/1
100 TABLET ORAL EVERY EVENING
Qty: 30 TABLET | Refills: 2 | Status: SHIPPED | OUTPATIENT
Start: 2024-07-17 | End: 2024-10-15

## 2024-07-17 RX ORDER — BUPROPION HYDROCHLORIDE 150 MG/1
150 TABLET ORAL EVERY MORNING
Qty: 30 TABLET | Refills: 2 | Status: SHIPPED | OUTPATIENT
Start: 2024-07-17 | End: 2024-10-15

## 2024-07-17 RX ORDER — DULOXETIN HYDROCHLORIDE 30 MG/1
30 CAPSULE, DELAYED RELEASE ORAL DAILY
Qty: 14 CAPSULE | Refills: 0 | Status: SHIPPED | OUTPATIENT
Start: 2024-07-17 | End: 2024-07-31

## 2024-07-17 RX ORDER — LAMOTRIGINE 200 MG/1
200 TABLET ORAL DAILY
Qty: 30 TABLET | Refills: 2 | Status: SHIPPED | OUTPATIENT
Start: 2024-07-17 | End: 2024-10-15

## 2024-07-17 NOTE — PROGRESS NOTES
"      Subjective   Martín Mccormick, a 58 y.o. male, presenting to Psychiatry for follow up.  Patient is referred by Vanessa Hammer DO.         Virtual or Telephone Consent     An interactive audio and video telecommunication system which permits real time communications between the patient (at the originating site) and provider (at the distant site) was utilized to provide this telehealth service.   Verbal consent was requested and obtained from Martín Mccormick on this date, 07/17/24 for a telehealth visit.          - Going to do a few jobs at his [employer]    - Parents are doing well    - Doing well    - Mom took a tumble the other day     - Back to her baseline    - Have caregivers 24/7     -Sister did not call him when mom fell which bothered him    - sister says \"I am the boss\"     - Lips \"tingled\" for a few days with reduced dose     - feels depressed sometimes     - Knocked tools down  and got really mad but snapped out of it quickly    - In the past he would have stomped and been angry for a  long time    - would like to get off Cymbalta and try Wellbutrin as he  still has  depressed mood and has  been on Cymnalta for some five  years      - The patient feels that the increase in Lamictal works for a few hours but does not work for the whole day.     - He does feel that the increase in the morning has helped his mood but then it seems to wear off and only lasts for for a few hours.      - Patient feels that he does not have any energy and is not motivated to do anything.     - The patient continues to have insomnia but this has been a chronic problem.  He is able to fall asleep but then wakes up and cannot fall back to sleep.       - Patient denies SI, HI, manic or psychotic symptoms.        Psychiatric Review Of Systems:  Depressive Symptoms: anhedonia, concentration, energy, and interest  Manic Symptoms: negative  Anxiety Symptoms: General Anxiety Disorder (MILDRED)MILDRED Behaviors: excessive anxiety/worry, " difficulty concentrating, easily fatigued, irritability, and restlessness  Psychotic Symptoms: negative  Other Symptoms:        Current Outpatient Psych Medications:     Cymbalta 60 mg Poi daily  Lamictal 275 mg PO daily      Medical History:  Medical History           Past Medical History:   Diagnosis Date    Bipolar II disorder (Multi)      Cardiology follow-up encounter 12/29/2022     Palmer Castellanos DO    Coronary artery disease       s/p CABG X2    Erectile dysfunction      Generalized anxiety disorder      Hyperlipidemia      Hypertension      Lesion of vocal fold      Lung nodules      Obesity      Other chest pain      Personal history of non-Hodgkin lymphomas      Restless legs syndrome      Social phobia, unspecified       Social anxiety disorder    Vitamin D deficiency              Past Psychiatric History:   Diagnoses: MILDRED, BAD II  Previous Psychiatrist:  none   Therapy/past treatments:  did talk therapy in the past but it did not work   Current psychiatric medications:  Lamictal and Cymbalta  Past psychiatric medications:  tried SSRIs, wellbutrin, Prozac made him tired,   Hospitalizations:  none   Suicide attempts:  SI, never   Family psychiatric history:  unknown     Social History:   Currently lives: with wife  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, electrical trade and heating/AC certification   : 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  Use of alcohol: patient reports recent  "increase in ETOH intake- has some in the  afternoon   Use of caffeine:  unknown  Use of other substances: denies  Legal consequences of substance use: n/a  Substance use disorder treatment: n/a     Record Review: brief     Medical Review Of Systems:  Pertinent items are noted in HPI.           Objective   Mental Status Exam  Appearance: dressed in jeans, good g/h  Attitude: Friendly. Calm, cooperative, and engaged in conversation.  Behavior: Appropriate eye contact.   Motor Activity: No psychomotor agitation or retardation. No abnormal movements, tremors or tics. No evidence of extrapyramidal symptoms or tardive dyskinesia.  Speech: Regular rate, rhythm, volume. Spontaneous, no pressured speech.  Mood: \"okay\"  Affect: Euthymic, full range, mood congruent.  Thought Process: Linear, logical, and goal-directed. No loose associations or gross thought disorganization.  Thought Content: Denied current suicidal ideation or thoughts of harm to self, denied homicidal ideation or thoughts of harm to others. No delusional thinking elicited. No perseverations or obsessions identified.   Perception: Did not endorse auditory or visual hallucinations, did not appear to be responding to hallucinatory stimuli.   Cognition: Alert, oriented x3. Preserved attention span and concentration, recent and remote memory. Adequate fund of knowledge. No deficits in language.   Insight: Fair, in regards to understanding mental health condition  Judgement: Fair              Vitals:     05/08/24 0938   BP: 121/90   Pulse: 99   Temp: 36.7 °C (98.1 °F)      Wt: 227 lbs     BMI:  32.57     Falls Risk:  n/a     Risk Assessment:  Risk of harm to self: Low Risk -- Risk factors include: Age, Alcohol or other substance abuse, Gender, Loss (relational, social, occupational, financial) , Medical illness comorbidity , and Psychosocial stressors including mom broke hip, problems with sister  Protective factors include:Denies current suicidal ideation, Denies " history of suicide attempts , Future-oriented talk , Willingness to seek help and support , Skills in problem solving, conflict resolution, and nonviolent handling of disputes, History of adhering to treatment recommendations and/or prescribed medication regimen , Support through ongoing medical and mental healthcare relationships , Current/history of good response to treatment/meds , Interpersonal relationships and supports, e.g., family, friends, peers, community , and Restricted access to firearms or other lethal means of suicide      Risk of harm to others: Low Risk - Risk factors include: No significant risk factors identified on screening. Protective factors include: Lack of known history of harm to others , Lack of known history of violent ideation , Lack of known access to firearms , Sense of community, availability/access to resources and support , Sense of optimism, hope , Affect regulation , Sense of self-efficacy, internal locus of control , and Positive, pro-social family/peer network            DXS:      BAD II      Assessment:   Patient is a 58 year old  male  with BAD II  on Lamictal and Cymbalta.  Patient feels a little better for a few hours in the morning on the increased dose of Lamictal but then it seems to wear off.   He feels as if he  has no energy or motivation.  Continues to struggle with issues with mom and sister.  Patient is agreeable to continue increasing Lamictal to 200 in the am and 100 mg PO at bedtime and decrease Cymbalta to 30 mg PO daily for two weeks and then start Wellbutrin  mg PO daily and discontinue Cymbalta.     Discussed risks, benefits and side effects.      Patient denies SI, HI, manic or psychotic symptoms.  No side effects reported.        Plan/Recommendations:  Medications: increase Lamictal to 200 mg PO daily and 100 mg PO at bedtime; decrease Cymbalta to 30 mg PO daily for two weeks then stop and  start Wellbutrin  mg PO daily  Follow up: 4 weeks  Call   Psychiatry at [phone] with issues.  For [facility] residents, [facility] is a 24/7 hotline you can call for assistance [[phone]]. Please call 270/755 or go to your closest Emergency Room if you feel unsafe. This includes thoughts of hurting yourself or anyone else, or having other troubles such as hearing voices, seeing visions, or having new and scary thoughts about the people around you.     Review with patient: Treatment plan reviewed with the patient.  Medication risks/benefit reviewed with the patient

## 2024-07-29 PROCEDURE — RXMED WILLOW AMBULATORY MEDICATION CHARGE

## 2024-07-30 ENCOUNTER — PHARMACY VISIT (OUTPATIENT)
Dept: PHARMACY | Facility: CLINIC | Age: 59
End: 2024-07-30
Payer: COMMERCIAL

## 2024-08-12 ENCOUNTER — PHARMACY VISIT (OUTPATIENT)
Dept: PHARMACY | Facility: CLINIC | Age: 59
End: 2024-08-12
Payer: COMMERCIAL

## 2024-08-12 PROCEDURE — RXMED WILLOW AMBULATORY MEDICATION CHARGE

## 2024-08-14 ENCOUNTER — PHARMACY VISIT (OUTPATIENT)
Dept: PHARMACY | Facility: CLINIC | Age: 59
End: 2024-08-14
Payer: COMMERCIAL

## 2024-08-14 ENCOUNTER — APPOINTMENT (OUTPATIENT)
Dept: BEHAVIORAL HEALTH | Facility: CLINIC | Age: 59
End: 2024-08-14
Payer: COMMERCIAL

## 2024-08-14 DIAGNOSIS — F31.81 BIPOLAR 2 DISORDER (MULTI): ICD-10-CM

## 2024-08-14 PROCEDURE — 99214 OFFICE O/P EST MOD 30 MIN: CPT | Performed by: PSYCHIATRY & NEUROLOGY

## 2024-08-14 PROCEDURE — RXMED WILLOW AMBULATORY MEDICATION CHARGE

## 2024-08-14 RX ORDER — BUPROPION HYDROCHLORIDE 300 MG/1
300 TABLET ORAL EVERY MORNING
Qty: 30 TABLET | Refills: 1 | Status: SHIPPED | OUTPATIENT
Start: 2024-08-14 | End: 2024-10-13

## 2024-08-14 NOTE — PROGRESS NOTES
"       Subjective   Martín Mccormick, a 58 y.o. male, presenting to Psychiatry for follow up.    Patient is referred by Vanessa Hammer DO.      Virtual or Telephone Consent    An interactive audio and video telecommunication system which permits real time communications between the patient (at the originating site) and provider (at the distant site) was utilized to provide this telehealth service.       Verbal consent was requested and obtained from Martín Mccormick on this date, 08/14/24 for a telehealth visit.      - feels like he is going \"backward\"    - stopped the Cymbalta  - started the Wellbutrin   - feels as if he is getting more easily frustrated since making the medication changes  - had drive heaves but is uncertain why  - has had some muscle soreness  - has been very emotional and sad which is unlike him  - always asking people if he is doing something wrong - has  done this before but feels he  is doing  it more often  - has anhedonia and no energy  - feels Lamictal is working for mood stabilization  - not getting angry easily like before  - mom fell and he caught her and she  broke her arm   - mom said she was ashamed in front of his sister and she finally realizes it is his mom and not him that has been the problem   - patient wishes his mom would tell  him she is proud of him  -only time  his father told him he was proud of him was when he  found out he had  cancer  - The patient continues to have insomnia but this has been a chronic problem.  He is able to fall asleep but then wakes up and cannot fall back to sleep.  - Patient denies SI, HI, manic or psychotic symptoms.     Psychiatric Review Of Systems:  Depressive Symptoms: anhedonia, concentration, energy, and interest  Manic Symptoms: negative  Anxiety Symptoms: General Anxiety Disorder (MILDRED)MILDRED Behaviors: excessive anxiety/worry, difficulty concentrating, easily fatigued, irritability, and restlessness  Psychotic Symptoms: negative  Other " Symptoms:      Current Outpatient Psych Medications:   Lamictal 200 mg PO daily and 100 mg PO at bedtime  Wellbutrin  mg PO daily         Medical History:  Medical History           Past Medical History:   Diagnosis Date    Bipolar II disorder (Multi)      Cardiology follow-up encounter 12/29/2022     Palmer Castellanos,     Coronary artery disease       s/p CABG X2    Erectile dysfunction      Generalized anxiety disorder      Hyperlipidemia      Hypertension      Lesion of vocal fold      Lung nodules      Obesity      Other chest pain      Personal history of non-Hodgkin lymphomas      Restless legs syndrome      Social phobia, unspecified       Social anxiety disorder    Vitamin D deficiency              Past Psychiatric History:   Diagnoses: MILDRED, BAD II  Previous Psychiatrist:  none   Therapy/past treatments:  did talk therapy in the past but it did not work   Current psychiatric medications:  Lamictal and Cymbalta  Past psychiatric medications:  tried SSRIs, wellbutrin, Prozac made him tired,   Hospitalizations:  none   Suicide attempts:  SI, never   Family psychiatric history:  unknown     Social History:   Currently lives: with wife  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[employer], electrical trade and heating/AC certification   : 2003;   2021   History of Learning Problem:   Work/Finances:  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  Use of alcohol: patient reports recent increase in ETOH intake- has some in the  afternoon   Use of caffeine:   "unknown  Use of other substances: denies  Legal consequences of substance use: n/a  Substance use disorder treatment: n/a     Record Review: brief     Medical Review Of Systems:  Pertinent items are noted in HPI.           Objective   Mental Status Exam  Appearance: dressed in jeans, good g/h  Attitude: Friendly. Calm, cooperative, and engaged in conversation.  Behavior: Appropriate eye contact.   Motor Activity: No psychomotor agitation or retardation. No abnormal movements, tremors or tics. No evidence of extrapyramidal symptoms or tardive dyskinesia.  Speech: Regular rate, rhythm, volume. Spontaneous, no pressured speech.  Mood: \"not great\"  Affect: Euthymic, full range, mood congruent.  Thought Process: Linear, logical, and goal-directed. No loose associations or gross thought disorganization.  Thought Content: Denied current suicidal ideation or thoughts of harm to self, denied homicidal ideation or thoughts of harm to others. No delusional thinking elicited. No perseverations or obsessions identified.   Perception: Did not endorse auditory or visual hallucinations, did not appear to be responding to hallucinatory stimuli.   Cognition: Alert, oriented x3. Preserved attention span and concentration, recent and remote memory. Adequate fund of knowledge. No deficits in language.   Insight: Fair, in regards to understanding mental health condition  Judgement: Fair              Vitals:     05/08/24 0938   BP: 121/90   Pulse: 99   Temp: 36.7 °C (98.1 °F)      Wt: 227 lbs     BMI:  32.57     Falls Risk:  n/a     Risk Assessment:  Risk of harm to self: Low Risk -- Risk factors include: Age, Alcohol or other substance abuse, Gender, Loss (relational, social, occupational, financial) , Medical illness comorbidity , and Psychosocial stressors including mom broke hip, problems with sister  Protective factors include:Denies current suicidal ideation, Denies history of suicide attempts , Future-oriented talk , Willingness to " seek help and support , Skills in problem solving, conflict resolution, and nonviolent handling of disputes, History of adhering to treatment recommendations and/or prescribed medication regimen , Support through ongoing medical and mental healthcare relationships , Current/history of good response to treatment/meds , Interpersonal relationships and supports, e.g., family, friends, peers, community , and Restricted access to firearms or other lethal means of suicide      Risk of harm to others: Low Risk - Risk factors include: No significant risk factors identified on screening. Protective factors include: Lack of known history of harm to others , Lack of known history of violent ideation , Lack of known access to firearms , Sense of community, availability/access to resources and support , Sense of optimism, hope , Affect regulation , Sense of self-efficacy, internal locus of control , and Positive, pro-social family/peer network            DXS:      BAD II      Assessment:   Patient is a 58 year old  male  with BAD II  on Lamictal and Wellbutrin.  Patient feels the Lamictal is helping.  He is now off of the Cymbalta and  is on Wellbutrin Xl  150 mg PO  daily.   Continues to feelsas if he  has no energy or motivation.  Continues to struggle with issues with mom and sister.  Patient is agreeable to continue increasing Wellbutrin XL to 300 mg PO daily.      Discussed risks, benefits and side effects.      Patient denies SI, HI, manic or psychotic symptoms.  No side effects reported.        Plan/Recommendations:  Medications: continue 200 mg PO daily and 100 mg PO at bedtime; increase Wellbutrin XL to 300 mg PO daily  Follow up: 2-3 weeks  Call  Psychiatry at [phone] with issues.  For [facility] residents, [employer] is a 24/7 hotline you can call for assistance [[phone]]. Please call 654/327 or go to your closest Emergency Room if you feel unsafe. This includes thoughts of hurting yourself or  anyone else, or having other troubles such as hearing voices, seeing visions, or having new and scary thoughts about the people around you.     Review with patient: Treatment plan reviewed with the patient.  Medication risks/benefit reviewed with the patient

## 2024-08-26 DIAGNOSIS — I25.10 CAD (CORONARY ARTERY DISEASE): Primary | ICD-10-CM

## 2024-08-26 PROCEDURE — RXMED WILLOW AMBULATORY MEDICATION CHARGE

## 2024-08-27 ENCOUNTER — PHARMACY VISIT (OUTPATIENT)
Dept: PHARMACY | Facility: CLINIC | Age: 59
End: 2024-08-27
Payer: COMMERCIAL

## 2024-08-27 PROCEDURE — RXMED WILLOW AMBULATORY MEDICATION CHARGE

## 2024-08-27 RX ORDER — ASPIRIN 81 MG/1
81 TABLET ORAL DAILY
Qty: 30 TABLET | Refills: 11 | Status: SHIPPED | OUTPATIENT
Start: 2024-08-27 | End: 2025-08-27

## 2024-08-28 ENCOUNTER — PHARMACY VISIT (OUTPATIENT)
Dept: PHARMACY | Facility: CLINIC | Age: 59
End: 2024-08-28
Payer: COMMERCIAL

## 2024-08-28 PROCEDURE — RXMED WILLOW AMBULATORY MEDICATION CHARGE

## 2024-09-05 DIAGNOSIS — J38.3 LESION OF VOCAL FOLD: ICD-10-CM

## 2024-09-05 PROCEDURE — RXMED WILLOW AMBULATORY MEDICATION CHARGE

## 2024-09-05 RX ORDER — OMEPRAZOLE 20 MG/1
20 CAPSULE, DELAYED RELEASE ORAL
Qty: 60 CAPSULE | Refills: 1 | Status: SHIPPED | OUTPATIENT
Start: 2024-09-05 | End: 2024-11-05

## 2024-09-06 ENCOUNTER — PHARMACY VISIT (OUTPATIENT)
Dept: PHARMACY | Facility: CLINIC | Age: 59
End: 2024-09-06
Payer: COMMERCIAL

## 2024-09-11 ENCOUNTER — APPOINTMENT (OUTPATIENT)
Dept: BEHAVIORAL HEALTH | Facility: CLINIC | Age: 59
End: 2024-09-11
Payer: COMMERCIAL

## 2024-09-19 ENCOUNTER — APPOINTMENT (OUTPATIENT)
Dept: BEHAVIORAL HEALTH | Facility: CLINIC | Age: 59
End: 2024-09-19
Payer: COMMERCIAL

## 2024-09-19 DIAGNOSIS — F31.81 BIPOLAR 2 DISORDER (MULTI): ICD-10-CM

## 2024-09-19 PROCEDURE — 99214 OFFICE O/P EST MOD 30 MIN: CPT | Performed by: PSYCHIATRY & NEUROLOGY

## 2024-09-19 PROCEDURE — RXMED WILLOW AMBULATORY MEDICATION CHARGE

## 2024-09-19 RX ORDER — BUPROPION HYDROCHLORIDE 150 MG/1
150 TABLET ORAL EVERY MORNING
Qty: 14 TABLET | Refills: 0 | Status: SHIPPED | OUTPATIENT
Start: 2024-09-19 | End: 2024-10-04

## 2024-09-19 NOTE — PROGRESS NOTES
"    Subjective   Martín Mccormick, a 58 y.o. male, presenting to Psychiatry for follow up.     Patient is referred by Vanessa Hammer DO.      Virtual or Telephone Consent     An interactive audio and video telecommunication system which permits real time communications between the patient (at the originating site) and provider (at the distant site) was utilized to provide this telehealth service.      Verbal consent was requested and obtained from Martín Mccormick on this date, 09/19/24 for a telehealth visit.       Patient feels like he is going \"backward\" and is going in the wrong direction.  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     Psychiatric Review Of Systems:  Depressive Symptoms: anhedonia, concentration, energy, and interest  Manic Symptoms: negative  Anxiety Symptoms: General Anxiety Disorder (MILDRED)MILDRED Behaviors: excessive anxiety/worry, difficulty concentrating, easily fatigued, irritability, and restlessness  Psychotic Symptoms: negative  Other Symptoms:      Current Outpatient Psych Medications:   Lamictal 200 mg PO daily and 100 mg PO at bedtime  Wellbutrin  mg PO daily                  Past Medical History:   Diagnosis Date    Bipolar II disorder (Multi)      Cardiology follow-up encounter 12/29/2022     Palmer Castellanos DO    " Coronary artery disease       s/p CABG X2    Erectile dysfunction      Generalized anxiety disorder      Hyperlipidemia      Hypertension      Lesion of vocal fold      Lung nodules      Obesity      Other chest pain      Personal history of non-Hodgkin lymphomas      Restless legs syndrome      Social phobia, unspecified       Social anxiety disorder    Vitamin D deficiency              Past Psychiatric History:   Diagnoses: MILDRED, BAD II  Previous Psychiatrist:  none   Therapy/past treatments:  did talk therapy in the past but it did not work   Current psychiatric medications:  Lamictal and Cymbalta  Past psychiatric medications:  tried SSRIs, wellbutrin, Prozac made him tired,   Hospitalizations:  none   Suicide attempts:  SI, never   Family psychiatric history:  unknown     Social History:   Currently lives: with wife  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[employer], electrical trade and heating/AC certification   : 2003;   2021   History of Learning Problem:   Work/Finances:  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  Use of alcohol: patient reports recent increase in ETOH intake- has some in the  afternoon   Use of caffeine:  unknown  Use of other substances: denies  Legal consequences of substance use: n/a  Substance use disorder treatment: n/a     Record Review: brief     Medical Review Of Systems:  Pertinent items are noted in HPI.     Objective   Mental Status Exam  Appearance: dressed in jeans, good g/h  Attitude: Friendly. Calm, cooperative, and engaged in  "conversation.  Behavior: Appropriate eye contact.   Motor Activity: No psychomotor agitation or retardation. No abnormal movements, tremors or tics. No evidence of extrapyramidal symptoms or tardive dyskinesia.  Speech: Regular rate, rhythm, volume. Spontaneous, no pressured speech.  Mood: \"not great\"  Affect: Euthymic, full range, mood congruent.  Thought Process: Linear, logical, and goal-directed. No loose associations or gross thought disorganization.  Thought Content: Denied current suicidal ideation or thoughts of harm to self, denied homicidal ideation or thoughts of harm to others. No delusional thinking elicited. No perseverations or obsessions identified.   Perception: Did not endorse auditory or visual hallucinations, did not appear to be responding to hallucinatory stimuli.   Cognition: Alert, oriented x3. Preserved attention span and concentration, recent and remote memory. Adequate fund of knowledge. No deficits in language.   Insight: Fair, in regards to understanding mental health condition  Judgement: Fair           Vitals:     05/08/24 0938   BP: 121/90   Pulse: 99   Temp: 36.7 °C (98.1 °F)      Wt: 227 lbs     BMI:  32.57     Falls Risk:  n/a     Risk Assessment:  Risk of harm to self: Low Risk -- Risk factors include: Age, Alcohol or other substance abuse, Gender, Loss (relational, social, occupational, financial) , Medical illness comorbidity , and Psychosocial stressors including mom broke hip, problems with sister  Protective factors include:Denies current suicidal ideation, Denies history of suicide attempts , Future-oriented talk , Willingness to seek help and support , Skills in problem solving, conflict resolution, and nonviolent handling of disputes, History of adhering to treatment recommendations and/or prescribed medication regimen , Support through ongoing medical and mental healthcare relationships , Current/history of good response to treatment/meds , Interpersonal relationships and " supports, e.g., family, friends, peers, community , and Restricted access to firearms or other lethal means of suicide      Risk of harm to others: Low Risk - Risk factors include: No significant risk factors identified on screening. Protective factors include: Lack of known history of harm to others , Lack of known history of violent ideation , Lack of known access to firearms , Sense of community, availability/access to resources and support , Sense of optimism, hope , Affect regulation , Sense of self-efficacy, internal locus of control , and Positive, pro-social family/peer network       DXS:   BAD II      Assessment:   Patient is a 58 year old  male  with BAD II  on Lamictal and Wellbutrin.  Patient feels the Lamictal is helping for mood stablization.  He is feeling worse on the increased dose of Wellbutrin with decreased energy and concentration, increased agitation and increased sadness and tearfulness.  Will decrease the Wellbutrin XL to 150 mg PO daily for 7 days and then discontinue if he has no withdrawal symptoms.  Will see how he does on just Lamictal for a period of time and possibly start a different antidepressant at a later date.      Discussed risks, benefits and side effects.      Patient denies SI, HI, manic or psychotic symptoms.  No side effects reported.        Plan/Recommendations:  Medications: continue 200 mg PO daily and 100 mg PO at bedtime  decrease Wellbutrin XL to 150 mg PO daily for 7 days and then discontinue   Follow up: 2-3 weeks  Call  Psychiatry at [phone] with issues.  For [facility] residents, [employer] is a 24/7 hotline you can call for assistance [[phone]]. Please call 189/039 or go to your closest Emergency Room if you feel unsafe. This includes thoughts of hurting yourself or anyone else, or having other troubles such as hearing voices, seeing visions, or having new and scary thoughts about the people around you.     Review with patient: Treatment  plan reviewed with the patient.  Medication risks/benefit reviewed with the patient

## 2024-09-20 ENCOUNTER — PHARMACY VISIT (OUTPATIENT)
Dept: PHARMACY | Facility: CLINIC | Age: 59
End: 2024-09-20
Payer: COMMERCIAL

## 2024-09-24 ENCOUNTER — PHARMACY VISIT (OUTPATIENT)
Dept: PHARMACY | Facility: CLINIC | Age: 59
End: 2024-09-24
Payer: COMMERCIAL

## 2024-09-24 PROCEDURE — RXMED WILLOW AMBULATORY MEDICATION CHARGE

## 2024-10-02 ENCOUNTER — APPOINTMENT (OUTPATIENT)
Dept: BEHAVIORAL HEALTH | Facility: CLINIC | Age: 59
End: 2024-10-02
Payer: COMMERCIAL

## 2024-10-02 DIAGNOSIS — F31.81 BIPOLAR 2 DISORDER (MULTI): ICD-10-CM

## 2024-10-02 PROCEDURE — 99214 OFFICE O/P EST MOD 30 MIN: CPT | Performed by: PSYCHIATRY & NEUROLOGY

## 2024-10-02 RX ORDER — DESVENLAFAXINE SUCCINATE 25 MG/1
25 TABLET, EXTENDED RELEASE ORAL DAILY
Qty: 30 TABLET | Refills: 1 | Status: SHIPPED | OUTPATIENT
Start: 2024-10-02 | End: 2024-12-01

## 2024-10-02 NOTE — PROGRESS NOTES
"Virtual or Telephone Consent    An interactive audio and video telecommunication system which permits real time communications between the patient (at the originating site) and provider (at the distant site) was utilized to provide this telehealth service.   Verbal consent was requested and obtained from Martín Mccormick on this date, 10/02/2024 for a telehealth visit.     Subjective   Martín Mccormick, a 58 y.o. male, presenting to Psychiatry for follow up.     Patient is referred by Vanessa Hammer DO.    Virtual or Telephone Consent    Patient reports he is no longer on the Wellbutrin and just stopped \"cold turkey\"  Just taking the Lamictal BID  Feeling a little better  Doing less of questioning everything  Still gets frustrated and mad working on bike or if other things don't go well - will feel like throwing things  This got better for a while but now it has come back but is not as strong as before  Feels that he needs something in addition to the Lamictal   Reports great stress due to problems with his father  Dad so focused on his wife  Got phone call from sister and dad was paranoid and aggressive, did not recognize anyone  He was speaking [place] and speaking about famly members he had not seen in years    Still feels like he is getting agitated but possibly a little less frequently  He still reports getting sad often and finds himself getting tearful when he is watching things on tv when he should not be sad  He feels as if he is often over thinking all of the time  Patient feels as if he is getting \"lazy\" again in the afternoon  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     Psychiatric Review Of Systems:  Depressive Symptoms: anhedonia, concentration, energy, and interest  Manic Symptoms: negative  Anxiety Symptoms: General Anxiety Disorder " (MILDRED)MILDRED Behaviors: excessive anxiety/worry, difficulty concentrating, easily fatigued, irritability, and restlessness  Psychotic Symptoms: negative  Other Symptoms:      Current Outpatient Psych Medications:   Lamictal 200 mg PO daily and 100 mg PO at bedtime                  Past Medical History:   Diagnosis Date    Bipolar II disorder (Multi)      Cardiology follow-up encounter 12/29/2022     Palmer Castellanos,     Coronary artery disease       s/p CABG X2    Erectile dysfunction      Generalized anxiety disorder      Hyperlipidemia      Hypertension      Lesion of vocal fold      Lung nodules      Obesity      Other chest pain      Personal history of non-Hodgkin lymphomas      Restless legs syndrome      Social phobia, unspecified       Social anxiety disorder    Vitamin D deficiency              Past Psychiatric History:   Diagnoses: MILDRED, BAD II  Previous Psychiatrist:  none   Therapy/past treatments:  did talk therapy in the past but it did not work   Current psychiatric medications:  Lamictal and Cymbalta  Past psychiatric medications:  tried SSRIs, wellbutrin, Prozac made him tired   Hospitalizations:  none   Suicide attempts:  SI, never   Family psychiatric history:  unknown     Social History:   Currently lives: with wife  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[employer], electrical trade and heating/AC certification   : 2003;   2021   History of Learning Problem:   Work/Finances:  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  Use of alcohol: patient reports  "recent increase in ETOH intake- has some in the  afternoon   Use of caffeine:  unknown  Use of other substances: denies  Legal consequences of substance use: n/a  Substance use disorder treatment: n/a     Record Review: brief     Medical Review Of Systems:  Pertinent items are noted in HPI.     Objective   Mental Status Exam  Appearance: dressed casually, good g/h  Attitude: Friendly. Calm, cooperative, and engaged in conversation.  Behavior: Appropriate eye contact.   Motor Activity: No psychomotor agitation or retardation. No abnormal movements, tremors or tics. No evidence of extrapyramidal symptoms or tardive dyskinesia.  Speech: Regular rate, rhythm, volume. Spontaneous, no pressured speech.  Mood: \"okay\"  Affect: Euthymic, full range, mood congruent.  Thought Process: Linear, logical, and goal-directed. No loose associations or gross thought disorganization.  Thought Content: Denied current suicidal ideation or thoughts of harm to self, denied homicidal ideation or thoughts of harm to others. No delusional thinking elicited. No perseverations or obsessions identified.   Perception: Did not endorse auditory or visual hallucinations, did not appear to be responding to hallucinatory stimuli.   Cognition: Alert, oriented x3. Preserved attention span and concentration, recent and remote memory. Adequate fund of knowledge. No deficits in language.   Insight: Fair, in regards to understanding mental health condition  Judgement: Fair           Vitals:     05/08/24 0938   BP: 121/90   Pulse: 99   Temp: 36.7 °C (98.1 °F)      Wt: 227 lbs     BMI:  32.57     Falls Risk:  n/a     Risk Assessment:  Risk of harm to self: Low Risk -- Risk factors include: Age, Alcohol or other substance abuse, Gender, Loss (relational, social, occupational, financial) , Medical illness comorbidity , and Psychosocial stressors including mom broke hip, problems with sister  Protective factors include:Denies current suicidal ideation, Denies " history of suicide attempts , Future-oriented talk , Willingness to seek help and support , Skills in problem solving, conflict resolution, and nonviolent handling of disputes, History of adhering to treatment recommendations and/or prescribed medication regimen , Support through ongoing medical and mental healthcare relationships , Current/history of good response to treatment/meds , Interpersonal relationships and supports, e.g., family, friends, peers, community , and Restricted access to firearms or other lethal means of suicide      Risk of harm to others: Low Risk - Risk factors include: No significant risk factors identified on screening. Protective factors include: Lack of known history of harm to others , Lack of known history of violent ideation , Lack of known access to firearms , Sense of community, availability/access to resources and support , Sense of optimism, hope , Affect regulation , Sense of self-efficacy, internal locus of control , and Positive, pro-social family/peer network       DXS:   BAD II      Assessment:   Patient is a 58 year old male  with BAD II on Lamictal and now off of Wellbutrin without any withdrawals symptoms.   Patient feels the Lamictal is helping for mood stablization.  He is feeling a little better off of the Wellbutrin but still feels easily agitated, overly emotional and tired in the afternoons.     Patient does not feel just the Lamictal is sufficient.  Discussed starting Pristiq for depressive symptoms.        Discussed risks, benefits and side effects.      Patient denies SI, HI, manic or psychotic symptoms.          Plan/Recommendations:  Medications: continue Lamictal 200 mg PO daily and 100 mg PO at bedtime  Start Pristiq 125mg PO daily   Follow up:  3-4 weeks  Call  Psychiatry at [phone] with issues.  For [facility] residents, [employer] is a 24/7 hotline you can call for assistance [[phone]]. Please call [phone] or go to your closest  Emergency Room if you feel unsafe. This includes thoughts of hurting yourself or anyone else, or having other troubles such as hearing voices, seeing visions, or having new and scary thoughts about the people around you.     Review with patient: Treatment plan reviewed with the patient.  Medication risks/benefit reviewed with the patient

## 2024-10-07 ENCOUNTER — APPOINTMENT (OUTPATIENT)
Dept: PRIMARY CARE | Facility: CLINIC | Age: 59
End: 2024-10-07
Payer: COMMERCIAL

## 2024-10-08 ENCOUNTER — PHARMACY VISIT (OUTPATIENT)
Dept: PHARMACY | Facility: CLINIC | Age: 59
End: 2024-10-08
Payer: COMMERCIAL

## 2024-10-08 DIAGNOSIS — F31.81 BIPOLAR 2 DISORDER (MULTI): ICD-10-CM

## 2024-10-08 PROCEDURE — RXMED WILLOW AMBULATORY MEDICATION CHARGE

## 2024-10-08 RX ORDER — LAMOTRIGINE 200 MG/1
200 TABLET ORAL DAILY
Qty: 30 TABLET | Refills: 2 | Status: SHIPPED | OUTPATIENT
Start: 2024-10-08 | End: 2025-01-06

## 2024-10-08 RX ORDER — LAMOTRIGINE 100 MG/1
100 TABLET ORAL EVERY EVENING
Qty: 30 TABLET | Refills: 2 | Status: SHIPPED | OUTPATIENT
Start: 2024-10-08 | End: 2025-01-06

## 2024-10-22 PROCEDURE — RXMED WILLOW AMBULATORY MEDICATION CHARGE

## 2024-10-23 ENCOUNTER — PHARMACY VISIT (OUTPATIENT)
Dept: PHARMACY | Facility: CLINIC | Age: 59
End: 2024-10-23
Payer: COMMERCIAL

## 2024-10-25 DIAGNOSIS — F31.81 BIPOLAR 2 DISORDER (MULTI): ICD-10-CM

## 2024-10-25 RX ORDER — DESVENLAFAXINE SUCCINATE 25 MG/1
25 TABLET, EXTENDED RELEASE ORAL DAILY
Qty: 90 TABLET | Refills: 1 | Status: SHIPPED | OUTPATIENT
Start: 2024-10-25

## 2024-10-30 ENCOUNTER — APPOINTMENT (OUTPATIENT)
Dept: BEHAVIORAL HEALTH | Facility: CLINIC | Age: 59
End: 2024-10-30
Payer: COMMERCIAL

## 2024-10-30 DIAGNOSIS — F31.81 BIPOLAR 2 DISORDER (MULTI): ICD-10-CM

## 2024-10-30 PROCEDURE — 99214 OFFICE O/P EST MOD 30 MIN: CPT | Performed by: PSYCHIATRY & NEUROLOGY

## 2024-10-30 RX ORDER — LAMOTRIGINE 200 MG/1
200 TABLET ORAL 2 TIMES DAILY
Qty: 60 TABLET | Refills: 2 | Status: SHIPPED | OUTPATIENT
Start: 2024-10-30 | End: 2025-01-30

## 2024-10-30 RX ORDER — LAMOTRIGINE 200 MG/1
200 TABLET ORAL DAILY
Qty: 60 TABLET | Refills: 2 | Status: SHIPPED | OUTPATIENT
Start: 2024-10-30 | End: 2024-10-30 | Stop reason: DRUGHIGH

## 2024-10-31 PROCEDURE — RXMED WILLOW AMBULATORY MEDICATION CHARGE

## 2024-11-01 ENCOUNTER — PHARMACY VISIT (OUTPATIENT)
Dept: PHARMACY | Facility: CLINIC | Age: 59
End: 2024-11-01
Payer: COMMERCIAL

## 2024-11-04 DIAGNOSIS — J38.3 LESION OF VOCAL FOLD: ICD-10-CM

## 2024-11-04 PROCEDURE — RXMED WILLOW AMBULATORY MEDICATION CHARGE

## 2024-11-04 RX ORDER — OMEPRAZOLE 20 MG/1
20 CAPSULE, DELAYED RELEASE ORAL
Qty: 60 CAPSULE | Refills: 1 | Status: SHIPPED | OUTPATIENT
Start: 2024-11-04 | End: 2025-01-06

## 2024-11-07 ENCOUNTER — PHARMACY VISIT (OUTPATIENT)
Dept: PHARMACY | Facility: CLINIC | Age: 59
End: 2024-11-07
Payer: COMMERCIAL

## 2024-11-08 DIAGNOSIS — R09.81 NASAL CONGESTION: ICD-10-CM

## 2024-11-11 RX ORDER — FLUTICASONE PROPIONATE 50 MCG
2 SPRAY, SUSPENSION (ML) NASAL 2 TIMES DAILY
Qty: 16 G | Refills: 11 | Status: SHIPPED | OUTPATIENT
Start: 2024-11-11 | End: 2025-11-11

## 2024-11-12 PROCEDURE — RXMED WILLOW AMBULATORY MEDICATION CHARGE

## 2024-11-19 PROCEDURE — RXMED WILLOW AMBULATORY MEDICATION CHARGE

## 2024-11-20 ENCOUNTER — PHARMACY VISIT (OUTPATIENT)
Dept: PHARMACY | Facility: CLINIC | Age: 59
End: 2024-11-20
Payer: COMMERCIAL

## 2024-11-20 PROCEDURE — RXMED WILLOW AMBULATORY MEDICATION CHARGE

## 2024-12-04 ENCOUNTER — APPOINTMENT (OUTPATIENT)
Dept: BEHAVIORAL HEALTH | Facility: CLINIC | Age: 59
End: 2024-12-04
Payer: COMMERCIAL

## 2024-12-04 DIAGNOSIS — F31.81 BIPOLAR 2 DISORDER (MULTI): ICD-10-CM

## 2024-12-04 PROCEDURE — 99214 OFFICE O/P EST MOD 30 MIN: CPT | Performed by: PSYCHIATRY & NEUROLOGY

## 2024-12-04 NOTE — PROGRESS NOTES
"     Virtual or Telephone Consent     An interactive audio and video telecommunication system which permits real time communications between the patient (at the originating site) and provider (at the distant site) was utilized to provide this telehealth service.   Verbal consent was requested and obtained from Martín Mccormick on this date, 12/04/2024 for a telehealth visit.     Subjective   Martín Mccormick, a 58 y.o. male, presenting to Psychiatry for follow up.     Patient is referred by Vanessa Hammer DO.    Virtual or Telephone Consent    The patient reports that he has been feeling very angry and frustrated    He has been getting upset at work and has been throwing and kicking chairs   He is also concerned about weight gain - has gained about five or six lbs  Thanksgiving was very stressful  He went to his in laws in the morning which was fine and later went to his sister's house - his sister did sit with them  His daughter did not even come to her Aunt's because she did not want to deal with the stress  Patient reports he is very close with his Dad   Mom is still hard on him and has always been this way  Mom gets mad at Dad at night  Feels the family will likely have to place then both in a nursing home soon   They looked into one nearby which seems reputable but there are no openings at this time         Patient reports he is \"all right\"   He has been very angry in the morning and is frustrated he cannot control his anger and frustration  He also reports significant dry mouth since starting the Pristiq    Stil taking the Lamictal BID  Stil gets frustrated and mad working on bike or if other things don't go well - will feel like throwing things  This got better for a while but now it has come back   Reports great stress due to problems with his father  Discussed with family that he should be seen by a neurologist but they do not feel it is necessary or would be helpful as he is in his 90s  At last visit he " "reported that he got phone call from sister and dad was paranoid and aggressive, did not recognize anyone  He was speaking [place] and speaking about famly members he had not seen in years   SHe feels as if he is often over thinking all of the time  Patient feels as if he is getting \"lazy\" again in the afternoon  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     Psychiatric Review Of Systems:  Depressive Symptoms: anhedonia, concentration, energy, and interest  Manic Symptoms: negative  Anxiety Symptoms: General Anxiety Disorder (MILDRED)MILDRED Behaviors: excessive anxiety/worry, difficulty concentrating, easily fatigued, irritability, and restlessness  Psychotic Symptoms: negative  Other Symptoms:      Current Outpatient Psych Medications:   Lamictal 200 mg PO daily and 100 mg PO at bedtime  Pristiq 25 mg Po daily                  Past Medical History:   Diagnosis Date    Bipolar II disorder (Multi)      Cardiology follow-up encounter 12/29/2022     Palmer Castellanos DO    Coronary artery disease       s/p CABG X2    Erectile dysfunction      Generalized anxiety disorder      Hyperlipidemia      Hypertension      Lesion of vocal fold      Lung nodules      Obesity      Other chest pain      Personal history of non-Hodgkin lymphomas      Restless legs syndrome      Social phobia, unspecified       Social anxiety disorder    Vitamin D deficiency              Past Psychiatric History:   Diagnoses: MILDRED, BAD II  Previous Psychiatrist:  none   Therapy/past treatments:  did talk therapy in the past but it did not work   Current psychiatric medications:  Lamictal and Cymbalta  Past psychiatric medications:  tried SSRIs, wellbutrin, Prozac made him tired   Pristiq dry mouth  Hospitalizations:  none   Suicide attempts:  SI, " "never   Family psychiatric history:  unknown     Social History:   Currently lives: with wife  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[employer], electrical trade and heating/AC certification   : 2003;   2021   History of Learning Problem:   Work/Finances:  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  Use of alcohol: patient reports recent increase in ETOH intake- has some in the  afternoon   Use of caffeine:  unknown  Use of other substances: denies  Legal consequences of substance use: n/a  Substance use disorder treatment: n/a     Record Review: brief     Medical Review Of Systems:  Pertinent items are noted in HPI.     Objective   Mental Status Exam  Appearance: dressed casually, good g/h  Attitude: Friendly. Calm, cooperative, and engaged in conversation.  Behavior: Appropriate eye contact.   Motor Activity: No psychomotor agitation or retardation. No abnormal movements, tremors or tics. No evidence of extrapyramidal symptoms or tardive dyskinesia.  Speech: Regular rate, rhythm, volume. Spontaneous, no pressured speech.  Mood: \"okay\"  Affect: Euthymic, full range, mood congruent.  Thought Process: Linear, logical, and goal-directed. No loose associations or gross thought disorganization.  Thought Content: Denied current suicidal ideation or thoughts of harm to self, denied homicidal ideation or thoughts of harm to others. No delusional thinking elicited. No perseverations or obsessions identified.   Perception: Did not endorse auditory or visual hallucinations, did " not appear to be responding to hallucinatory stimuli.   Cognition: Alert, oriented x3. Preserved attention span and concentration, recent and remote memory. Adequate fund of knowledge. No deficits in language.   Insight: Fair, in regards to understanding mental health condition  Judgement: Fair           Vitals:     05/08/24 0938   BP: 121/90   Pulse: 99   Temp: 36.7 °C (98.1 °F)      Wt: 227 lbs     BMI:  32.57     Falls Risk:  n/a     Risk Assessment:  Risk of harm to self: Low Risk -- Risk factors include: Age, Alcohol or other substance abuse, Gender, Loss (relational, social, occupational, financial) , Medical illness comorbidity , and Psychosocial stressors including mom broke hip, problems with sister  Protective factors include:Denies current suicidal ideation, Denies history of suicide attempts , Future-oriented talk , Willingness to seek help and support , Skills in problem solving, conflict resolution, and nonviolent handling of disputes, History of adhering to treatment recommendations and/or prescribed medication regimen , Support through ongoing medical and mental healthcare relationships , Current/history of good response to treatment/meds , Interpersonal relationships and supports, e.g., family, friends, peers, community , and Restricted access to firearms or other lethal means of suicide      Risk of harm to others: Low Risk - Risk factors include: No significant risk factors identified on screening. Protective factors include: Lack of known history of harm to others , Lack of known history of violent ideation , Lack of known access to firearms , Sense of community, availability/access to resources and support , Sense of optimism, hope , Affect regulation , Sense of self-efficacy, internal locus of control , and Positive, pro-social family/peer network       DXS:   BAD II      Assessment:   Patient is a 58 year old male with BAD II on Lamictal only.  He did not do well on the Pristiq.  He is having  struggles with getting easily frustrated, getting angry to the point of throwing things, even at work.  He feels as if he was doing much better when he was on the Lamictal and the Cymbalta.      Patient feels the Lamictal is helping for mood stablization.  He would like another trial of the Cymbalta before trying a different medication from another category of medication such as Latuda.      Discussed risks, benefits and side effects.      Patient denies SI, HI, manic or psychotic symptoms.          Plan/Recommendations:  Medications: continue Lamictal 200 mg PO BID   Start Cymbalta    Follow up:  3-4 weeks  Call  Psychiatry at [phone] with issues.  For [facility] residents, [employer] is a 24/7 hotline you can call for assistance [748.684.1722]. Please call 503/677 or go to your closest Emergency Room if you feel unsafe. This includes thoughts of hurting yourself or anyone else, or having other troubles such as hearing voices, seeing visions, or having new and scary thoughts about the people around you.     Review with patient: Treatment plan reviewed with the patient.  Medication risks/benefit reviewed with the patient

## 2024-12-16 ENCOUNTER — PHARMACY VISIT (OUTPATIENT)
Dept: PHARMACY | Facility: CLINIC | Age: 59
End: 2024-12-16
Payer: COMMERCIAL

## 2024-12-16 DIAGNOSIS — I10 PRIMARY HYPERTENSION: ICD-10-CM

## 2024-12-16 PROCEDURE — RXMED WILLOW AMBULATORY MEDICATION CHARGE

## 2024-12-16 RX ORDER — METOPROLOL SUCCINATE 100 MG/1
100 TABLET, EXTENDED RELEASE ORAL DAILY
Qty: 30 TABLET | Refills: 11 | Status: SHIPPED | OUTPATIENT
Start: 2024-12-16 | End: 2025-12-16

## 2024-12-18 ENCOUNTER — LAB (OUTPATIENT)
Dept: LAB | Facility: LAB | Age: 59
End: 2024-12-18
Payer: COMMERCIAL

## 2024-12-18 DIAGNOSIS — E78.5 HYPERLIPIDEMIA, UNSPECIFIED HYPERLIPIDEMIA TYPE: ICD-10-CM

## 2024-12-18 DIAGNOSIS — Z12.5 SCREENING FOR PROSTATE CANCER: ICD-10-CM

## 2024-12-18 DIAGNOSIS — R73.09 ELEVATED GLUCOSE: ICD-10-CM

## 2024-12-18 DIAGNOSIS — N52.9 ERECTILE DYSFUNCTION, UNSPECIFIED ERECTILE DYSFUNCTION TYPE: ICD-10-CM

## 2024-12-18 LAB
ALBUMIN SERPL BCP-MCNC: 4.7 G/DL (ref 3.4–5)
ALP SERPL-CCNC: 60 U/L (ref 33–120)
ALT SERPL W P-5'-P-CCNC: 47 U/L (ref 10–52)
ANION GAP SERPL CALC-SCNC: 15 MMOL/L (ref 10–20)
AST SERPL W P-5'-P-CCNC: 50 U/L (ref 9–39)
BASOPHILS # BLD AUTO: 0.04 X10*3/UL (ref 0–0.1)
BASOPHILS NFR BLD AUTO: 0.7 %
BILIRUB SERPL-MCNC: 0.9 MG/DL (ref 0–1.2)
BUN SERPL-MCNC: 22 MG/DL (ref 6–23)
CALCIUM SERPL-MCNC: 10.1 MG/DL (ref 8.6–10.6)
CHLORIDE SERPL-SCNC: 99 MMOL/L (ref 98–107)
CHOLEST SERPL-MCNC: 184 MG/DL (ref 0–199)
CHOLESTEROL/HDL RATIO: 2.9
CO2 SERPL-SCNC: 27 MMOL/L (ref 21–32)
CREAT SERPL-MCNC: 0.86 MG/DL (ref 0.5–1.3)
EGFRCR SERPLBLD CKD-EPI 2021: >90 ML/MIN/1.73M*2
EOSINOPHIL # BLD AUTO: 0.18 X10*3/UL (ref 0–0.7)
EOSINOPHIL NFR BLD AUTO: 2.9 %
ERYTHROCYTE [DISTWIDTH] IN BLOOD BY AUTOMATED COUNT: 12.8 % (ref 11.5–14.5)
EST. AVERAGE GLUCOSE BLD GHB EST-MCNC: 100 MG/DL
GLUCOSE SERPL-MCNC: 111 MG/DL (ref 74–99)
HBA1C MFR BLD: 5.1 %
HCT VFR BLD AUTO: 50.7 % (ref 41–52)
HDLC SERPL-MCNC: 63.6 MG/DL
HGB BLD-MCNC: 17.3 G/DL (ref 13.5–17.5)
IMM GRANULOCYTES # BLD AUTO: 0.01 X10*3/UL (ref 0–0.7)
IMM GRANULOCYTES NFR BLD AUTO: 0.2 % (ref 0–0.9)
LDLC SERPL CALC-MCNC: 100 MG/DL
LYMPHOCYTES # BLD AUTO: 1.37 X10*3/UL (ref 1.2–4.8)
LYMPHOCYTES NFR BLD AUTO: 22.3 %
MCH RBC QN AUTO: 30.9 PG (ref 26–34)
MCHC RBC AUTO-ENTMCNC: 34.1 G/DL (ref 32–36)
MCV RBC AUTO: 91 FL (ref 80–100)
MONOCYTES # BLD AUTO: 0.44 X10*3/UL (ref 0.1–1)
MONOCYTES NFR BLD AUTO: 7.2 %
NEUTROPHILS # BLD AUTO: 4.1 X10*3/UL (ref 1.2–7.7)
NEUTROPHILS NFR BLD AUTO: 66.7 %
NON HDL CHOLESTEROL: 120 MG/DL (ref 0–149)
NRBC BLD-RTO: 0 /100 WBCS (ref 0–0)
PLATELET # BLD AUTO: 174 X10*3/UL (ref 150–450)
POTASSIUM SERPL-SCNC: 3.9 MMOL/L (ref 3.5–5.3)
PROT SERPL-MCNC: 7 G/DL (ref 6.4–8.2)
PSA SERPL-MCNC: 1.06 NG/ML
RBC # BLD AUTO: 5.6 X10*6/UL (ref 4.5–5.9)
SODIUM SERPL-SCNC: 137 MMOL/L (ref 136–145)
TESTOST SERPL-MCNC: 600 NG/DL (ref 240–1000)
TRIGL SERPL-MCNC: 101 MG/DL (ref 0–149)
TSH SERPL-ACNC: 0.91 MIU/L (ref 0.44–3.98)
VLDL: 20 MG/DL (ref 0–40)
WBC # BLD AUTO: 6.1 X10*3/UL (ref 4.4–11.3)

## 2024-12-18 PROCEDURE — 84403 ASSAY OF TOTAL TESTOSTERONE: CPT

## 2024-12-18 PROCEDURE — 84153 ASSAY OF PSA TOTAL: CPT

## 2024-12-18 PROCEDURE — 80061 LIPID PANEL: CPT

## 2024-12-18 PROCEDURE — 80053 COMPREHEN METABOLIC PANEL: CPT

## 2024-12-18 PROCEDURE — 36415 COLL VENOUS BLD VENIPUNCTURE: CPT

## 2024-12-18 PROCEDURE — 83036 HEMOGLOBIN GLYCOSYLATED A1C: CPT

## 2024-12-18 PROCEDURE — 85025 COMPLETE CBC W/AUTO DIFF WBC: CPT

## 2024-12-18 PROCEDURE — 84443 ASSAY THYROID STIM HORMONE: CPT

## 2024-12-18 NOTE — PROGRESS NOTES
"Subjective   Patient ID: Martín Mccormick is a 59 y.o. male who presents for Follow-up (6 months).    Parents are both 95, dad has sundowners and mom is adamant that they stay at home. They are getting more care now which is expensive. Initially stressing him out but now he is feeling ok with the situation.     Arthritis: Following with Dr. Wylie. Got a shot in his left knee, which helped. He is dealing with pain in knees, elbows, and hips. He was told to take ibuprofen but that doesn't helps.      Vocal cord lesion: He was having PND and went to ENT, was found to have a lesion on his vocal cord. Got it removed. He was told yesterday that it was benign.     CAD, tachycardia, HTN: Following with cardiology, Dr. Edwards. He had cardiac bypass detected through calcium score. Bypass Nov 2022 (two vessel). Started on chlorthalidone. He is taking metoprolol and rosuvastatin.     Bipolar disorder: On cymbalta and lamictal, feels these are helping. No SE's. He has been more down lately. He is planning to see Dr. Martinez and discuss the dose.      RLS: He is taking higher dose ropinirole which is helping.    BPH: He is taking flomax he is still getting up 3 times per night.      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 Radiation to the area anterior to his heart which contributed to his cardiac condition.       Review of systems completed and unremarkable other than what is documented in HPI.    Objective   /88 (BP Location: Left arm, Patient Position: Sitting, BP Cuff Size: Large adult)   Pulse 84   Ht 1.778 m (5' 10\")   Wt 97.1 kg (214 lb)   BMI 30.71 kg/m²     Gen: No acute distress, alert and oriented x3, pleasant   HEENT: moist mucous membranes, b/l external auditory canals are clear of debris, TMs within normal limits, no oropharyngeal lesions, eomi, perrla   Neck: thyroid within normal limits, no lymphadenopathy   CV: RRR, normal S1/S2, no murmur   Resp: " Clear to auscultation bilaterally, no wheezes or rhonchi appreciated  Abd: soft, nontender, non-distended, no guarding/rigidity, bowel sounds present  Extr: no edema, no calf tenderness  Derm: Skin is warm and dry, no rashes appreciated  Psych: mood is good, affect is congruent, good hygiene, normal speech and eye contact  Neuro: cranial nerves grossly intact, normal gait    Assessment/Plan   #CAD  #HTN  S/p two vessel bypass Nov 2022  Following with cardiology, Dr. Edwards  Currently on metoprolol, chlorthalidone, and rosuvastatin  Try switching to atorvastatin, recheck lipid in 3 mo  Add on losartan     #Bipolar disorder:   Reasonable control on cymbalta and lamictal  Following with psych     #RLS:   Not controlled on ropinirole  Pramipexole is working but he is taking high amount (1mg BID)  Switch to only taking at bedtime     #ED  Doing well on cialis     #BPH  Tamsulosin is not helping  Add on finasteride  Stop tamsulosin at followup    #OA  On high dose meloxicam  Stop this and start diclofenac BID     #Vocal cord irritation  On PPI  Work on losing 10lb and then we will try stopping     #H/o lymphoblastic lymphoma:   T cell lymphoma  s/p chemo and radiation for this 12 years ago     #Vitamin D deficiency   On replacement    HCM:  C-scope 2023 neg, next due 2033  Monitoring yearly PSA  S/p flu and shingles

## 2024-12-24 ENCOUNTER — APPOINTMENT (OUTPATIENT)
Dept: PRIMARY CARE | Facility: CLINIC | Age: 59
End: 2024-12-24
Payer: COMMERCIAL

## 2024-12-24 ENCOUNTER — PHARMACY VISIT (OUTPATIENT)
Dept: PHARMACY | Facility: CLINIC | Age: 59
End: 2024-12-24
Payer: COMMERCIAL

## 2024-12-24 VITALS
BODY MASS INDEX: 30.64 KG/M2 | DIASTOLIC BLOOD PRESSURE: 88 MMHG | HEART RATE: 84 BPM | HEIGHT: 70 IN | WEIGHT: 214 LBS | SYSTOLIC BLOOD PRESSURE: 135 MMHG

## 2024-12-24 DIAGNOSIS — N40.0 BENIGN PROSTATIC HYPERPLASIA, UNSPECIFIED WHETHER LOWER URINARY TRACT SYMPTOMS PRESENT: ICD-10-CM

## 2024-12-24 DIAGNOSIS — I10 PRIMARY HYPERTENSION: ICD-10-CM

## 2024-12-24 DIAGNOSIS — M19.90 ARTHRITIS: ICD-10-CM

## 2024-12-24 DIAGNOSIS — E78.2 MIXED HYPERLIPIDEMIA: Primary | ICD-10-CM

## 2024-12-24 DIAGNOSIS — E55.9 VITAMIN D DEFICIENCY: ICD-10-CM

## 2024-12-24 PROCEDURE — 3079F DIAST BP 80-89 MM HG: CPT | Performed by: FAMILY MEDICINE

## 2024-12-24 PROCEDURE — 3008F BODY MASS INDEX DOCD: CPT | Performed by: FAMILY MEDICINE

## 2024-12-24 PROCEDURE — RXMED WILLOW AMBULATORY MEDICATION CHARGE

## 2024-12-24 PROCEDURE — 3075F SYST BP GE 130 - 139MM HG: CPT | Performed by: FAMILY MEDICINE

## 2024-12-24 PROCEDURE — 99214 OFFICE O/P EST MOD 30 MIN: CPT | Performed by: FAMILY MEDICINE

## 2024-12-24 RX ORDER — LOSARTAN POTASSIUM 25 MG/1
25 TABLET ORAL DAILY
Qty: 30 TABLET | Refills: 11 | Status: SHIPPED | OUTPATIENT
Start: 2024-12-24 | End: 2025-12-24

## 2024-12-24 RX ORDER — FINASTERIDE 5 MG/1
5 TABLET, FILM COATED ORAL DAILY
Qty: 90 TABLET | Refills: 3 | Status: SHIPPED | OUTPATIENT
Start: 2024-12-24 | End: 2025-12-24

## 2024-12-24 RX ORDER — DICLOFENAC SODIUM 75 MG/1
75 TABLET, DELAYED RELEASE ORAL 2 TIMES DAILY PRN
Qty: 60 TABLET | Refills: 0 | Status: SHIPPED | OUTPATIENT
Start: 2024-12-24 | End: 2025-02-22

## 2024-12-24 RX ORDER — ATORVASTATIN CALCIUM 40 MG/1
40 TABLET, FILM COATED ORAL DAILY
Qty: 100 TABLET | Refills: 3 | Status: SHIPPED | OUTPATIENT
Start: 2024-12-24 | End: 2026-01-28

## 2024-12-24 RX ORDER — ATORVASTATIN CALCIUM 40 MG/1
40 TABLET, FILM COATED ORAL DAILY
Qty: 100 TABLET | Refills: 3 | Status: SHIPPED | OUTPATIENT
Start: 2024-12-24 | End: 2024-12-24 | Stop reason: ALTCHOICE

## 2024-12-24 NOTE — PATIENT INSTRUCTIONS
Stop your morning dose of the pramipexole    Switch rosuvastatin to atorvastatin    Switch meloxicam to diclofenac twice a day    Add on finasteride, this one is for the prostate and takes 6 months to take full effect

## 2024-12-30 ENCOUNTER — PHARMACY VISIT (OUTPATIENT)
Dept: PHARMACY | Facility: CLINIC | Age: 59
End: 2024-12-30
Payer: COMMERCIAL

## 2024-12-30 DIAGNOSIS — J38.3 LESION OF VOCAL FOLD: ICD-10-CM

## 2024-12-30 PROCEDURE — RXMED WILLOW AMBULATORY MEDICATION CHARGE

## 2024-12-30 RX ORDER — OMEPRAZOLE 20 MG/1
20 CAPSULE, DELAYED RELEASE ORAL
Qty: 60 CAPSULE | Refills: 1 | Status: SHIPPED | OUTPATIENT
Start: 2024-12-30 | End: 2025-02-28

## 2025-01-09 ENCOUNTER — APPOINTMENT (OUTPATIENT)
Dept: BEHAVIORAL HEALTH | Facility: CLINIC | Age: 60
End: 2025-01-09
Payer: COMMERCIAL

## 2025-01-09 DIAGNOSIS — F31.81 BIPOLAR 2 DISORDER (MULTI): ICD-10-CM

## 2025-01-09 PROCEDURE — 99214 OFFICE O/P EST MOD 30 MIN: CPT | Performed by: PSYCHIATRY & NEUROLOGY

## 2025-01-09 RX ORDER — DULOXETIN HYDROCHLORIDE 30 MG/1
30 CAPSULE, DELAYED RELEASE ORAL NIGHTLY
COMMUNITY

## 2025-01-09 RX ORDER — DULOXETIN HYDROCHLORIDE 60 MG/1
60 CAPSULE, DELAYED RELEASE ORAL DAILY
COMMUNITY

## 2025-01-09 NOTE — PROGRESS NOTES
"     Virtual or Telephone Consent     An interactive audio and video telecommunication system which permits real time communications between the patient (at the originating site) and provider (at the distant site) was utilized to provide this telehealth service.   Verbal consent was requested and obtained from Martín Mccormick on this date, 01/09/2025 for a telehealth visit.     Subjective   Martín Mccormick, a 58 y.o. male, presenting to Psychiatry for follow up.     Patient is referred by Vanessa Hammer DO.    Virtual or Telephone Consent    Wife Rosa was present for appointment    Patient reports his holidays were \"okay\"  His Mom is now in a nursing home and is in rehab   Her kidneys are not working well  He brought his father there today   Sister is \"throwing gas on the fire\"   Taking Lamictal 200 mg PO qhs   Now taking Cymbalta 60 in am and 30 at night  Still getting tired after dinner and will fall asleep in his matthew   Does not feel he is motivated  Stress is much better   Now waking up early which he did  not do in the past - wife does not think this is a new thing  Feels he needs a \"booster\" after dinner so he does not fall asleep   Reports he is feeling much less angry   He had been getting upset at work and was throwing and kicking chairs due to his anger   He is still concerned about weight gain - has gained about five or six lbs  Patient reports he is very close with his Dad   Mom is still hard on him and has always been this way  Feels the Cymbalta is much better than the Pristiq  Patient denies SI, HI, manic or psychotic symptoms.     Psychiatric Review Of Systems:  Depressive Symptoms: anhedonia, concentration, energy, and interest  Manic Symptoms: negative  Anxiety Symptoms: General Anxiety Disorder (MILDRED)MILDRED Behaviors: excessive anxiety/worry, difficulty concentrating, easily fatigued, irritability, and restlessness  Psychotic Symptoms: negative  Other Symptoms:      Current Outpatient Psych " Medications:   Lamictal 200 mg PO qhs  Cymbalta 60 mg PO daily and 30 mg PO qhs                  Past Medical History:   Diagnosis Date    Bipolar II disorder (Multi)      Cardiology follow-up encounter 12/29/2022     Palmer Castellanos DO    Coronary artery disease       s/p CABG X2    Erectile dysfunction      Generalized anxiety disorder      Hyperlipidemia      Hypertension      Lesion of vocal fold      Lung nodules      Obesity      Other chest pain      Personal history of non-Hodgkin lymphomas      Restless legs syndrome      Social phobia, unspecified       Social anxiety disorder    Vitamin D deficiency              Past Psychiatric History:   Diagnoses: MILDRED, BAD II  Previous Psychiatrist:  none   Therapy/past treatments:  did talk therapy in the past but it did not work   Current psychiatric medications:  Lamictal and Cymbalta  Past psychiatric medications:  tried SSRIs, wellbutrin, Prozac made him tired   Pristiq dry mouth  Hospitalizations:  none   Suicide attempts:  SI, never   Family psychiatric history:  unknown     Social History:   Currently lives: with wife  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[employer], electrical trade and heating/AC certification   : 2003;   2021   History of Learning Problem:   Work/Finances:  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  Use of alcohol: less frequent  Use of caffeine:  unknown  Use of other substances: denies  Legal consequences of substance use: n/a  Substance use disorder treatment: n/a     Record Review:  "brief     Medical Review Of Systems:  Pertinent items are noted in HPI.     Objective   Mental Status Exam  Appearance: dressed casually, good g/h  Attitude: Friendly. Calm, cooperative, and engaged in conversation.  Behavior: Appropriate eye contact.   Motor Activity: No psychomotor agitation or retardation. No abnormal movements, tremors or tics. No evidence of extrapyramidal symptoms or tardive dyskinesia.  Speech: Regular rate, rhythm, volume. Spontaneous, no pressured speech.  Mood: \"okay\"  Affect: Euthymic, full range, mood congruent.  Thought Process: Linear, logical, and goal-directed. No loose associations or gross thought disorganization.  Thought Content: Denied current suicidal ideation or thoughts of harm to self, denied homicidal ideation or thoughts of harm to others. No delusional thinking elicited. No perseverations or obsessions identified.   Perception: Did not endorse auditory or visual hallucinations, did not appear to be responding to hallucinatory stimuli.   Cognition: Alert, oriented x3. Preserved attention span and concentration, recent and remote memory. Adequate fund of knowledge. No deficits in language.   Insight: Fair, in regards to understanding mental health condition  Judgement: Fair           Vitals:     05/08/24 0938   BP: 121/90   Pulse: 99   Temp: 36.7 °C (98.1 °F)      Wt: 227 lbs     BMI:  32.57     Falls Risk:  n/a     Risk Assessment:  Risk of harm to self: Low Risk -- Risk factors include: Age, Alcohol or other substance abuse, Gender, Loss (relational, social, occupational, financial) , Medical illness comorbidity , and Psychosocial stressors including mom broke hip, problems with sister  Protective factors include:Denies current suicidal ideation, Denies history of suicide attempts , Future-oriented talk , Willingness to seek help and support , Skills in problem solving, conflict resolution, and nonviolent handling of disputes, History of adhering to treatment " recommendations and/or prescribed medication regimen , Support through ongoing medical and mental healthcare relationships , Current/history of good response to treatment/meds , Interpersonal relationships and supports, e.g., family, friends, peers, community , and Restricted access to firearms or other lethal means of suicide      Risk of harm to others: Low Risk - Risk factors include: No significant risk factors identified on screening. Protective factors include: Lack of known history of harm to others , Lack of known history of violent ideation , Lack of known access to firearms , Sense of community, availability/access to resources and support , Sense of optimism, hope , Affect regulation , Sense of self-efficacy, internal locus of control , and Positive, pro-social family/peer network       DXS:   BAD II   MILDRED     Assessment:   Patient is a 59 year old male with BAD II on Lamictal 200 mg PO at bedtime and now Cymbalta 60 mg PO daily and 30 mg PO at bedtime.  He is now under much less stress as both of his parents are now in a nursing facility nearby.     He is no longer experiencing anger episodes but he does report fatigue after dinner and falls asleep only to wake up at 3 or 4 am.  Wife said this is not new.      Patient feels the Lamictal is helping for mood stabilzation and the Cymbalta is helping with anger.      Still may consider Latuda in the future.      Discussed risks, benefits and side effects.      Patient denies SI, HI, manic or psychotic symptoms.          Plan/Recommendations:  Medications: continue Lamictal 200 mg PO BID and Cymbalta 60 mg PO daily and Cymbalta 30 mg PO qhs    Follow up:  3-4 weeks  Call  Psychiatry at [phone] with issues.  For [facility] residents, [employer] is a 24/7 hotline you can call for assistance [[phone]]. Please call [phone] or go to your closest Emergency Room if you feel unsafe. This includes thoughts of hurting yourself or anyone else, or  having other troubles such as hearing voices, seeing visions, or having new and scary thoughts about the people around you.     Review with patient: Treatment plan reviewed with the patient.  Medication risks/benefit reviewed with the patient

## 2025-01-12 ENCOUNTER — HOSPITAL ENCOUNTER (EMERGENCY)
Facility: HOSPITAL | Age: 60
Discharge: HOME | End: 2025-01-12
Attending: EMERGENCY MEDICINE
Payer: COMMERCIAL

## 2025-01-12 VITALS
WEIGHT: 200 LBS | TEMPERATURE: 97.5 F | RESPIRATION RATE: 16 BRPM | OXYGEN SATURATION: 96 % | BODY MASS INDEX: 28 KG/M2 | HEIGHT: 71 IN | DIASTOLIC BLOOD PRESSURE: 55 MMHG | SYSTOLIC BLOOD PRESSURE: 116 MMHG | HEART RATE: 78 BPM

## 2025-01-12 DIAGNOSIS — S61.213A LACERATION OF LEFT MIDDLE FINGER WITHOUT FOREIGN BODY, NAIL DAMAGE STATUS UNSPECIFIED, INITIAL ENCOUNTER: Primary | ICD-10-CM

## 2025-01-12 PROCEDURE — 12001 RPR S/N/AX/GEN/TRNK 2.5CM/<: CPT | Performed by: EMERGENCY MEDICINE

## 2025-01-12 PROCEDURE — 99283 EMERGENCY DEPT VISIT LOW MDM: CPT | Performed by: EMERGENCY MEDICINE

## 2025-01-12 PROCEDURE — 90471 IMMUNIZATION ADMIN: CPT | Performed by: EMERGENCY MEDICINE

## 2025-01-12 PROCEDURE — 90715 TDAP VACCINE 7 YRS/> IM: CPT | Performed by: EMERGENCY MEDICINE

## 2025-01-12 PROCEDURE — 2500000004 HC RX 250 GENERAL PHARMACY W/ HCPCS (ALT 636 FOR OP/ED): Performed by: EMERGENCY MEDICINE

## 2025-01-12 RX ORDER — LIDOCAINE HYDROCHLORIDE 10 MG/ML
10 INJECTION, SOLUTION INFILTRATION; PERINEURAL ONCE
Status: COMPLETED | OUTPATIENT
Start: 2025-01-12 | End: 2025-01-12

## 2025-01-12 RX ADMIN — LIDOCAINE HYDROCHLORIDE 10 ML: 10 INJECTION, SOLUTION INFILTRATION; PERINEURAL at 21:02

## 2025-01-12 RX ADMIN — TETANUS TOXOID, REDUCED DIPHTHERIA TOXOID AND ACELLULAR PERTUSSIS VACCINE, ADSORBED 0.5 ML: 5; 2.5; 8; 8; 2.5 SUSPENSION INTRAMUSCULAR at 21:02

## 2025-01-12 ASSESSMENT — PAIN - FUNCTIONAL ASSESSMENT: PAIN_FUNCTIONAL_ASSESSMENT: 0-10

## 2025-01-12 ASSESSMENT — PAIN SCALES - GENERAL
PAINLEVEL_OUTOF10: 1
PAINLEVEL_OUTOF10: 0 - NO PAIN

## 2025-01-12 ASSESSMENT — COLUMBIA-SUICIDE SEVERITY RATING SCALE - C-SSRS
6. HAVE YOU EVER DONE ANYTHING, STARTED TO DO ANYTHING, OR PREPARED TO DO ANYTHING TO END YOUR LIFE?: NO
2. HAVE YOU ACTUALLY HAD ANY THOUGHTS OF KILLING YOURSELF?: NO
1. IN THE PAST MONTH, HAVE YOU WISHED YOU WERE DEAD OR WISHED YOU COULD GO TO SLEEP AND NOT WAKE UP?: NO

## 2025-01-12 ASSESSMENT — PAIN DESCRIPTION - PAIN TYPE: TYPE: ACUTE PAIN

## 2025-01-12 ASSESSMENT — PAIN DESCRIPTION - DESCRIPTORS: DESCRIPTORS: ACHING

## 2025-01-13 NOTE — ED TRIAGE NOTES
Pt reports left 3rd finger laceration that occurred at private residence in the kitchen. Pt reports was using a kitchen knife when he accidentally cut his left 3rd finger. Pt denies being on blood thinners. Occurred 45 minutes prior to arrival.

## 2025-01-13 NOTE — ED PROVIDER NOTES
HPI   Chief Complaint   Patient presents with    Finger Laceration       59-year-old male presents with left middle finger finger laceration.  Cut with knife while cutting some pepperoni.  Denies any other injuries or complaints.  Tetanus immunization more than 10 years.              Patient History   Past Medical History:   Diagnosis Date    Bipolar II disorder (Multi)     Cardiology follow-up encounter 12/29/2022    Palmer Castellanos DO    Coronary artery disease     s/p CABG X2    Erectile dysfunction     Generalized anxiety disorder     Hyperlipidemia     Hypertension     Lesion of vocal fold     Lung nodules     Obesity     Other chest pain     Personal history of non-Hodgkin lymphomas     Restless legs syndrome     Social phobia, unspecified     Social anxiety disorder    Vitamin D deficiency      Past Surgical History:   Procedure Laterality Date    CORONARY ARTERY BYPASS GRAFT  10/2022    OTHER SURGICAL HISTORY      Finger surgical procedure    TONSILLECTOMY       Family History   Problem Relation Name Age of Onset    Other (Heart problem) Mother      Hypertension Father      No Known Problems Sister      Alcohol abuse Paternal Grandmother      Alcohol abuse Paternal Grandfather      Heart attack Paternal Grandfather      Alcohol abuse Father's Brother      Heart attack Paternal Great-Grandfather       Social History     Tobacco Use    Smoking status: Never     Passive exposure: Never    Smokeless tobacco: Current     Types: Chew, Snuff   Vaping Use    Vaping status: Never Used   Substance Use Topics    Alcohol use: Yes     Alcohol/week: 14.0 standard drinks of alcohol     Types: 14 Cans of beer per week    Drug use: Never       Physical Exam   ED Triage Vitals [01/12/25 2048]   Temperature Heart Rate Respirations BP   37 °C (98.6 °F) 79 16 120/79      SpO2 Temp Source Heart Rate Source Patient Position   98 % Temporal Monitor --      BP Location FiO2 (%)     -- --       Physical Exam  HENT:      Head:  Normocephalic and atraumatic.   Musculoskeletal:      Comments: Left middle fingertip radial aspect 1 cm laceration with active bleeding with minimal nail involvement   Neurological:      Mental Status: He is alert.           ED Course & MDM   Diagnoses as of 01/12/25 2136   Laceration of left middle finger without foreign body, nail damage status unspecified, initial encounter                 No data recorded     Hardin Coma Scale Score: 15 (01/12/25 2056 : Yolis Hector RN)                           Medical Decision Making  1 cm laceration to left middle finger with active bleeding.  Repaired using Prolene 5 -0.  Patient to return for any concerns.        Procedure  Laceration Repair    Performed by: Mirza Vance MD  Authorized by: Mirza Vance MD    Consent:     Consent obtained:  Verbal    Risks, benefits, and alternatives were discussed: yes      Risks discussed:  Infection and pain  Universal protocol:     Procedure explained and questions answered to patient or proxy's satisfaction: yes      Patient identity confirmed:  Verbally with patient  Anesthesia:     Anesthesia method:  Local infiltration    Local anesthetic:  Lidocaine 1% w/o epi  Laceration details:     Location:  Finger    Finger location:  L long finger    Length (cm):  1  Pre-procedure details:     Preparation:  Patient was prepped and draped in usual sterile fashion  Exploration:     Limited defect created (wound extended): no      Wound exploration: wound explored through full range of motion      Wound extent: no areolar tissue violation noted, no foreign bodies/material noted and no underlying fracture noted      Contaminated: no    Treatment:     Area cleansed with:  Povidone-iodine    Amount of cleaning:  Standard    Debridement:  None    Undermining:  None  Skin repair:     Repair method:  Sutures    Suture size:  5-0    Suture material:  Prolene    Number of sutures:  2  Approximation:     Approximation:  Close  Repair type:      Repair type:  Simple  Post-procedure details:     Dressing:  Antibiotic ointment and adhesive bandage    Procedure completion:  Tolerated well, no immediate complications       Mirza Vance MD  01/12/25 4883

## 2025-01-20 DIAGNOSIS — G25.81 RESTLESS LEG SYNDROME: ICD-10-CM

## 2025-01-20 PROCEDURE — RXMED WILLOW AMBULATORY MEDICATION CHARGE

## 2025-01-20 RX ORDER — PRAMIPEXOLE DIHYDROCHLORIDE 0.5 MG/1
0.5 TABLET ORAL 3 TIMES DAILY
Qty: 90 TABLET | Refills: 11 | Status: SHIPPED | OUTPATIENT
Start: 2025-01-20 | End: 2026-01-20

## 2025-01-21 ENCOUNTER — PHARMACY VISIT (OUTPATIENT)
Dept: PHARMACY | Facility: CLINIC | Age: 60
End: 2025-01-21
Payer: COMMERCIAL

## 2025-01-23 DIAGNOSIS — F31.81 BIPOLAR 2 DISORDER (MULTI): ICD-10-CM

## 2025-01-23 PROCEDURE — RXMED WILLOW AMBULATORY MEDICATION CHARGE

## 2025-01-23 RX ORDER — LAMOTRIGINE 200 MG/1
200 TABLET ORAL 2 TIMES DAILY
Qty: 60 TABLET | Refills: 2 | Status: SHIPPED | OUTPATIENT
Start: 2025-01-23 | End: 2025-04-24

## 2025-01-24 ENCOUNTER — PHARMACY VISIT (OUTPATIENT)
Dept: PHARMACY | Facility: CLINIC | Age: 60
End: 2025-01-24
Payer: COMMERCIAL

## 2025-02-05 ENCOUNTER — PHARMACY VISIT (OUTPATIENT)
Dept: PHARMACY | Facility: CLINIC | Age: 60
End: 2025-02-05
Payer: COMMERCIAL

## 2025-02-05 ENCOUNTER — APPOINTMENT (OUTPATIENT)
Dept: BEHAVIORAL HEALTH | Facility: CLINIC | Age: 60
End: 2025-02-05
Payer: COMMERCIAL

## 2025-02-05 DIAGNOSIS — F31.81 BIPOLAR 2 DISORDER (MULTI): ICD-10-CM

## 2025-02-05 PROCEDURE — 99214 OFFICE O/P EST MOD 30 MIN: CPT | Performed by: PSYCHIATRY & NEUROLOGY

## 2025-02-05 PROCEDURE — RXMED WILLOW AMBULATORY MEDICATION CHARGE

## 2025-02-05 RX ORDER — LAMOTRIGINE 200 MG/1
200 TABLET ORAL DAILY
Qty: 90 TABLET | Refills: 0 | Status: SHIPPED | OUTPATIENT
Start: 2025-02-26 | End: 2025-05-27

## 2025-02-05 RX ORDER — DULOXETIN HYDROCHLORIDE 60 MG/1
120 CAPSULE, DELAYED RELEASE ORAL DAILY
Qty: 60 CAPSULE | Refills: 2 | Status: SHIPPED | OUTPATIENT
Start: 2025-02-05 | End: 2025-05-06

## 2025-02-05 NOTE — PROGRESS NOTES
"      Maria Teresa Martinez MD  Physician  Psychiatry     Progress Notes     Signed     Last Encounter Date: 1/9/2025     Signed              Virtual or Telephone Consent     An interactive audio and video telecommunication system which permits real time communications between the patient (at the originating site) and provider (at the distant site) was utilized to provide this telehealth service.   Verbal consent was requested and obtained from Martín Mccormick on this date, 01/05/2025 for a telehealth visit.     Subjective   Martín Mccormick, a 58 y.o. male, presenting to Psychiatry for follow up.     Patient is referred by Vanessa Hammer DO.    Virtual or Telephone Consent     Patient feels \"pretty good\"  Feels he is focusing more  Anger is less about \"3/4\"  improved   Feels like he is more patient   Has been feeling sad about his parents  Put his mom and dad in a home  Seeing Dad leaving the house he built in 1957 was difficult  Mom is not doing well   Dad has not had any episodes at night since moving to the nursing home  Used to sleep in his lazy boy and now he is sleeping on his bed  Mom stays in her room and Dad sits with her all day  Put her on Zoloft - low dose  She is sleeping all of the time and is not eating as much  Dad has adapted well   Dad fell at 3 am last night and went to ER - got a small fracture  He came to the hospital to see him   He is now getting along with his sister and she is calling him every day.  There was some kind of misunderstanding which they have now worked out    Taking Lamictal 200 mg PO daily but will switch to qhs   Now taking Cymbalta 60 in am and 30 at night  Still getting tired after dinner and will fall asleep in his matthew   Does not feel he is motivated  He is still concerned about weight gain - has gained about five or six lbs  Patient reports he is very close with his Dad   Mom is still hard on him and has always been this way  Feels the Cymbalta is much better than the " Pristiq  Patient denies SI, HI, manic or psychotic symptoms.     Psychiatric Review Of Systems:  Depressive Symptoms: anhedonia, concentration, energy, and interest  Manic Symptoms: negative  Anxiety Symptoms: General Anxiety Disorder (MILDRED)MILDRED Behaviors: excessive anxiety/worry, difficulty concentrating, easily fatigued, irritability, and restlessness  Psychotic Symptoms: negative  Other Symptoms:      Current Outpatient Psych Medications:   Lamictal 200 mg PO qhs  Cymbalta 60 mg PO daily and 30 mg PO qhs                  Past Medical History:   Diagnosis Date    Bipolar II disorder (Multi)      Cardiology follow-up encounter 12/29/2022     Palmer MINISTERIO DO Jasmin    Coronary artery disease       s/p CABG X2    Erectile dysfunction      Generalized anxiety disorder      Hyperlipidemia      Hypertension      Lesion of vocal fold      Lung nodules      Obesity      Other chest pain      Personal history of non-Hodgkin lymphomas      Restless legs syndrome      Social phobia, unspecified       Social anxiety disorder    Vitamin D deficiency              Past Psychiatric History:   Diagnoses: MILDRED, BAD II  Previous Psychiatrist:  none   Therapy/past treatments:  did talk therapy in the past but it did not work   Current psychiatric medications:  Lamictal and Cymbalta  Past psychiatric medications:  tried SSRIs, wellbutrin, Prozac made him tired   Pristiq dry mouth  Hospitalizations:  none   Suicide attempts:  SI, never   Family psychiatric history:  unknown     Social History:   Currently lives: with wife  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[employer], electrical trade and heating/AC certification   : 2003;   2021   History of Learning Problem:   Work/Finances:  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  Use of alcohol: less frequent  Use of caffeine:  unknown  Use of other substances: denies  Legal consequences of substance use: n/a  Substance use disorder treatment: n/a     Record Review: brief     Medical Review Of Systems:  Pertinent items are noted in HPI.     Objective   Mental Status Exam  Appearance: dressed casually, good g/h  Attitude: Friendly. Calm, cooperative, and engaged in conversation.  Behavior: Appropriate eye contact.   Motor Activity: No psychomotor agitation or retardation. No abnormal movements, tremors or tics. No evidence of extrapyramidal symptoms or tardive dyskinesia.  Speech: Regular rate, rhythm, volume. Spontaneous, no pressured speech.  Mood: \"better\" euthymic   Affect: Full range, mood congruent.  Thought Process: Linear, logical, and goal-directed. No loose associations or gross thought disorganization.  Thought Content: Denied current suicidal ideation or thoughts of harm to self, denied homicidal ideation or thoughts of harm to others. No delusional thinking elicited. No perseverations or obsessions identified.   Perception: Did not endorse auditory or visual hallucinations, did not appear to be responding to hallucinatory stimuli.   Cognition: Alert, oriented x3. Preserved attention span and concentration, recent and remote memory. Adequate fund of knowledge. No deficits in language.   Insight: Fair, in regards to understanding mental health condition  Judgement: Fair           Vitals:     05/08/24 0938   BP: 121/90   Pulse: 99   Temp: 36.7 °C (98.1 °F)      Wt: 227 lbs     BMI:  32.57     Falls Risk:  n/a     Risk Assessment:  Risk of harm to self: Low Risk -- Risk factors include: Age, Alcohol or other substance abuse, Gender, Loss " (relational, social, occupational, financial) , Medical illness comorbidity , and Psychosocial stressors including mom broke hip, problems with sister  Protective factors include:Denies current suicidal ideation, Denies history of suicide attempts , Future-oriented talk , Willingness to seek help and support , Skills in problem solving, conflict resolution, and nonviolent handling of disputes, History of adhering to treatment recommendations and/or prescribed medication regimen , Support through ongoing medical and mental healthcare relationships , Current/history of good response to treatment/meds , Interpersonal relationships and supports, e.g., family, friends, peers, community , and Restricted access to firearms or other lethal means of suicide      Risk of harm to others: Low Risk - Risk factors include: No significant risk factors identified on screening. Protective factors include: Lack of known history of harm to others , Lack of known history of violent ideation , Lack of known access to firearms , Sense of community, availability/access to resources and support , Sense of optimism, hope , Affect regulation , Sense of self-efficacy, internal locus of control , and Positive, pro-social family/peer network       DXS:   BAD II   MILDRED     Assessment:   Patient is a 59 year old male with BAD II on Lamictal 200 mg PO daily and Cymbalta 60 mg PO daily and 30 mg PO at bedtime.      He is now under much less stress as both of his parents are now in a nursing facility nearby.      He is no longer experiencing anger episodes but he does report fatigue after dinner and falls asleep only to wake up at 3 or 4 am.  Wife said this is not new.       Patient feels the Lamictal is helping for mood stabilzation and the Cymbalta is helping with anger.  He would like to increase the Cymbalta to 120 mg PO daily to see if will help with energy and motivation later in the day and after dinner.  Will switch the Lamictal to 200 mg PO  at bedtime.     Discussed risks, benefits and side effects.      Patient denies SI, HI, manic or psychotic symptoms.          Plan/Recommendations:  Medications: continue Lamictal 200 mg PO but change to qhs   Increase Cymbalta to 120 mg PO daily     Follow up:  4 weeks  Call  Psychiatry at [phone] with issues.  For [facility] residents, [employer] is a 24/7 hotline you can call for assistance [257.196.5671]. Please call [phone] or go to your closest Emergency Room if you feel unsafe. This includes thoughts of hurting yourself or anyone else, or having other troubles such as hearing voices, seeing visions, or having new and scary thoughts about the people around you.     Review with patient: Treatment plan reviewed with the patient.  Medication risks/benefit reviewed with the patient                         Electronically signed by Maria Teresa Martinez MD at 1/13/2025 12:56 PM

## 2025-02-11 ENCOUNTER — PHARMACY VISIT (OUTPATIENT)
Dept: PHARMACY | Facility: CLINIC | Age: 60
End: 2025-02-11
Payer: COMMERCIAL

## 2025-02-11 PROCEDURE — RXMED WILLOW AMBULATORY MEDICATION CHARGE

## 2025-02-12 ENCOUNTER — PHARMACY VISIT (OUTPATIENT)
Dept: PHARMACY | Facility: CLINIC | Age: 60
End: 2025-02-12
Payer: COMMERCIAL

## 2025-02-12 DIAGNOSIS — J38.3 LESION OF VOCAL FOLD: ICD-10-CM

## 2025-02-12 PROCEDURE — RXMED WILLOW AMBULATORY MEDICATION CHARGE

## 2025-02-12 RX ORDER — OMEPRAZOLE 20 MG/1
20 CAPSULE, DELAYED RELEASE ORAL
Qty: 60 CAPSULE | Refills: 1 | Status: SHIPPED | OUTPATIENT
Start: 2025-02-12 | End: 2025-04-13

## 2025-02-17 ENCOUNTER — PHARMACY VISIT (OUTPATIENT)
Dept: PHARMACY | Facility: CLINIC | Age: 60
End: 2025-02-17
Payer: COMMERCIAL

## 2025-02-17 DIAGNOSIS — N52.9 ERECTILE DYSFUNCTION, UNSPECIFIED ERECTILE DYSFUNCTION TYPE: ICD-10-CM

## 2025-02-17 PROCEDURE — RXMED WILLOW AMBULATORY MEDICATION CHARGE

## 2025-02-17 RX ORDER — TADALAFIL 10 MG/1
10 TABLET ORAL DAILY PRN
Qty: 90 TABLET | Refills: 3 | Status: SHIPPED | OUTPATIENT
Start: 2025-02-17 | End: 2026-02-17

## 2025-02-28 ENCOUNTER — PHARMACY VISIT (OUTPATIENT)
Dept: PHARMACY | Facility: CLINIC | Age: 60
End: 2025-02-28
Payer: COMMERCIAL

## 2025-02-28 DIAGNOSIS — N52.9 ERECTILE DYSFUNCTION, UNSPECIFIED ERECTILE DYSFUNCTION TYPE: ICD-10-CM

## 2025-02-28 DIAGNOSIS — M19.90 ARTHRITIS: ICD-10-CM

## 2025-02-28 PROCEDURE — RXMED WILLOW AMBULATORY MEDICATION CHARGE

## 2025-02-28 RX ORDER — TAMSULOSIN HYDROCHLORIDE 0.4 MG/1
0.4 CAPSULE ORAL DAILY
Qty: 90 CAPSULE | Refills: 3 | Status: SHIPPED | OUTPATIENT
Start: 2025-02-28 | End: 2026-02-28

## 2025-02-28 RX ORDER — DICLOFENAC SODIUM 75 MG/1
75 TABLET, DELAYED RELEASE ORAL 2 TIMES DAILY PRN
Qty: 60 TABLET | Refills: 0 | Status: SHIPPED | OUTPATIENT
Start: 2025-02-28 | End: 2025-04-29

## 2025-03-12 ENCOUNTER — APPOINTMENT (OUTPATIENT)
Dept: BEHAVIORAL HEALTH | Facility: CLINIC | Age: 60
End: 2025-03-12
Payer: COMMERCIAL

## 2025-03-12 ENCOUNTER — PHARMACY VISIT (OUTPATIENT)
Dept: PHARMACY | Facility: CLINIC | Age: 60
End: 2025-03-12
Payer: COMMERCIAL

## 2025-03-12 DIAGNOSIS — F31.81 BIPOLAR 2 DISORDER (MULTI): ICD-10-CM

## 2025-03-12 PROCEDURE — 99214 OFFICE O/P EST MOD 30 MIN: CPT | Performed by: PSYCHIATRY & NEUROLOGY

## 2025-03-12 PROCEDURE — RXMED WILLOW AMBULATORY MEDICATION CHARGE

## 2025-03-12 RX ORDER — MIRTAZAPINE 15 MG/1
15 TABLET, FILM COATED ORAL NIGHTLY
Qty: 30 TABLET | Refills: 2 | Status: SHIPPED | OUTPATIENT
Start: 2025-03-12 | End: 2025-06-10

## 2025-03-12 NOTE — PROGRESS NOTES
"  Maria Teresa Martinez MD  Physician  Psychiatry     Progress Notes     Signed                       Virtual or Telephone Consent     An interactive audio and video telecommunication system which permits real time communications between the patient (at the originating site) and provider (at the distant site) was utilized to provide this telehealth service.   Verbal consent was requested and obtained from Martín Mccormick on this date, 2025 for a telehealth visit.     Subjective   Martín Mccormick, a 58 y.o. male, presenting to Psychiatry for follow up.     Patient is referred by Vanessa Hammer DO.       Patient is doing okay   Unfortunately he Lost mom on Saturday  Father was in the room when she passed away  Were  for 74 years  His father has adapted to nursing home  His father cried after she  but now he uncertain his dad understands that she is paula  Had calling hours and went to [employer]  Had a peaceful passing at age 89   Went out \"her way\"   Had three priests who new his mother - did [employer] which was very nice    Has \"zero\" energy and is very tired  Did not want to go out even to lie in the sun when on vacation  Has lost confidence in himself  Then it \"went away\" and now it is back   Is sleeping well - taking Lamictal  Remeron - has not been on it as far as he knows  Sister is very negative about everything  Rosa and in-laws thought they were mistreated by his sister  Sister is five years older  She thought mom was mad at her due to placing her in a nursing home  Dad has been telling stories about his past and goes back and forth in [place]  Patient has not been angry recently  Feels he is \"lazy\" - still has unfinished projects  Has been feeling sad about his parents  Seeing Dad leaving the house he built in  was difficult  Used to sleep in his lazy boy and now he is sleeping on his bed  Dad has adapted well   Patient is  now getting along with his sister and she is calling him every day.  There " was some kind of misunderstanding which they have now worked out    Taking Lamictal 200 mg PO qhs   Now taking Cymbalta 90 mg PO daily   Still getting tired after dinner and will fall asleep in his chair   Does not feel he is motivated  He is still concerned about weight gain - has gained about five or six lbs  Patient reports he is very close with his Dad   Feels the Cymbalta is much better than the Pristiq  Patient denies SI, HI, manic or psychotic symptoms.     Psychiatric Review Of Systems:  Depressive Symptoms: anhedonia, concentration, energy, and interest  Manic Symptoms: negative  Anxiety Symptoms: General Anxiety Disorder (MILDRED)MILDRED Behaviors: excessive anxiety/worry, difficulty concentrating, easily fatigued, irritability, and restlessness  Psychotic Symptoms: negative  Other Symptoms:      Current Outpatient Psych Medications:   Lamictal 200 mg PO qhs  Cymbalta 120 mg PO daily                   Past Medical History:   Diagnosis Date    Bipolar II disorder (Multi)      Cardiology follow-up encounter 12/29/2022     Palmer Castellanos,     Coronary artery disease       s/p CABG X2    Erectile dysfunction      Generalized anxiety disorder      Hyperlipidemia      Hypertension      Lesion of vocal fold      Lung nodules      Obesity      Other chest pain      Personal history of non-Hodgkin lymphomas      Restless legs syndrome      Social phobia, unspecified       Social anxiety disorder    Vitamin D deficiency              Past Psychiatric History:   Diagnoses: MILDRED, BAD II  Previous Psychiatrist:  none   Therapy/past treatments:  did talk therapy in the past but it did not work   Current psychiatric medications:  Lamictal and Cymbalta  Past psychiatric medications:  tried SSRIs, wellbutrin, Prozac made him tired; Effexor - did not work    Pristiq dry mouth  Hospitalizations:  none   Suicide attempts:  SI, never   Family psychiatric history:  unknown     Social History:   Currently lives: with  "wife  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[employer], 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  Use of alcohol: less frequent  Use of caffeine:  unknown  Use of other substances: denies  Legal consequences of substance use: n/a  Substance use disorder treatment: n/a     Record Review: brief     Medical Review Of Systems:  Pertinent items are noted in HPI.     Objective   Mental Status Exam  Appearance: dressed casually, good g/h  Attitude: Friendly. Calm, cooperative, and engaged in conversation.  Behavior: Appropriate eye contact.   Motor Activity: No psychomotor agitation or retardation. No abnormal movements, tremors or tics. No evidence of extrapyramidal symptoms or tardive dyskinesia.  Speech: Regular rate, rhythm, volume. Spontaneous, no pressured speech.  Mood: \"not great\" dysphoric  Affect: limited  range, mood congruent.  Thought Process: Linear, logical, and goal-directed. No loose associations or gross thought disorganization.  Thought Content: Denied current suicidal ideation or thoughts of harm to self, denied homicidal ideation or thoughts of harm to others. No delusional thinking elicited. No perseverations or obsessions identified.   Perception: Did not endorse auditory or visual hallucinations, did not appear to be responding to hallucinatory stimuli.   Cognition: Alert, oriented x3. Preserved " attention span and concentration, recent and remote memory. Adequate fund of knowledge. No deficits in language.   Insight: Fair, in regards to understanding mental health condition  Judgement: Fair           Vitals:     05/08/24 0938   BP: 121/90   Pulse: 99   Temp: 36.7 °C (98.1 °F)      Wt: 200 lbs     BMI:  27.89     Falls Risk:  n/a     Risk Assessment:  Risk of harm to self: Low Risk -- Risk factors include: Age, Alcohol or other substance abuse, Gender, Loss (relational, social, occupational, financial) , Medical illness comorbidity , and Psychosocial stressors including mom broke hip, problems with sister  Protective factors include:Denies current suicidal ideation, Denies history of suicide attempts , Future-oriented talk , Willingness to seek help and support , Skills in problem solving, conflict resolution, and nonviolent handling of disputes, History of adhering to treatment recommendations and/or prescribed medication regimen , Support through ongoing medical and mental healthcare relationships , Current/history of good response to treatment/meds , Interpersonal relationships and supports, e.g., family, friends, peers, community , and Restricted access to firearms or other lethal means of suicide      Risk of harm to others: Low Risk - Risk factors include: No significant risk factors identified on screening. Protective factors include: Lack of known history of harm to others , Lack of known history of violent ideation , Lack of known access to firearms , Sense of community, availability/access to resources and support , Sense of optimism, hope , Affect regulation , Sense of self-efficacy, internal locus of control , and Positive, pro-social family/peer network       DXS:   BAD II   MILDRED     Assessment:   Patient is a 59 year old male with BAD II on Lamictal 200 mg PO daily and Cymbalta 120 mg PO daily.  He is feeling unmotivated and has some depressive symptoms.  Will start Remeron 15 mg PO at bedtime.        He is no longer experiencing anger episodes but he does report fatigue after dinner.  Reports sleeping well. .       Patient feels the Lamictal is helping for mood stabilzation and he is now taking it at night.  and the Cymbalta is helping with anger.  He would like to add another antidepressant and has never been on Remeron.     Discussed risks, benefits and side effects.      Patient denies SI, HI, manic or psychotic symptoms.          Plan/Recommendations:  Medications: continue Lamictal 200 mg PO at bedtime and  Cymbalta to 120 mg PO daily     Start Remeron 15 mg PO qhs  Follow up:  6 weeks  Call  Psychiatry at [phone] with issues.  For [facility] residents, [employer] is a 24/7 hotline you can call for assistance [[phone]]. Please call [phone] or go to your closest Emergency Room if you feel unsafe. This includes thoughts of hurting yourself or anyone else, or having other troubles such as hearing voices, seeing visions, or having new and scary thoughts about the people around you.     Review with patient: Treatment plan reviewed with the patient.  Medication risks/benefit reviewed with the patient

## 2025-03-17 PROCEDURE — RXMED WILLOW AMBULATORY MEDICATION CHARGE

## 2025-03-21 ENCOUNTER — PHARMACY VISIT (OUTPATIENT)
Dept: PHARMACY | Facility: CLINIC | Age: 60
End: 2025-03-21
Payer: COMMERCIAL

## 2025-03-28 ENCOUNTER — PHARMACY VISIT (OUTPATIENT)
Dept: PHARMACY | Facility: CLINIC | Age: 60
End: 2025-03-28
Payer: COMMERCIAL

## 2025-03-28 DIAGNOSIS — M19.90 ARTHRITIS: ICD-10-CM

## 2025-03-28 PROCEDURE — RXMED WILLOW AMBULATORY MEDICATION CHARGE

## 2025-03-28 RX ORDER — DICLOFENAC SODIUM 75 MG/1
75 TABLET, DELAYED RELEASE ORAL 2 TIMES DAILY PRN
Qty: 60 TABLET | Refills: 0 | Status: SHIPPED | OUTPATIENT
Start: 2025-03-28 | End: 2025-05-27

## 2025-04-04 NOTE — PROGRESS NOTES
"Subjective   Patient ID: Martín Mccormick is a 59 y.o. male who presents for Follow-up (3 months).    Dad is 95, he is [place]. Mom passed away a few weeks ago.      Arthritis: Following with Dr. Wylie. Got a shot in his left knee, which helped. He is dealing with pain in knees, elbows, and hips. He was told to take ibuprofen but that doesn't helps. He is taking diclofenac BID and it helped but if he misses doses he is having a lot of pain.      Vocal cord lesion: He was having PND and went to ENT, was found to have a lesion on his vocal cord. Got it removed. He was told yesterday that it was benign.     CAD, tachycardia, HTN: Following with cardiology, Dr. Edwards. He had cardiac bypass detected through calcium score. Bypass Nov 2022 (two vessel). Started on chlorthalidone. He is taking metoprolol and atorvastatin. Completed recent labs. He is drinking beer on a daily basis unsure how many beers but sometimes drinks non-alcoholic beers. Has tried calorie counting but wasn't able to meet his goals. He is frustrated about weight gain.      Bipolar disorder: On cymbalta and lamictal, feels these are helping. No SE's. He has been more down lately. He is planning to see Dr. Martinez and discuss the dose.      RLS: He is taking higher dose ropinirole which is helping.     BPH: He is taking flomax he is still getting up 3 times per night.      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 Radiation to the area anterior to his heart which contributed to his cardiac condition.       Review of systems completed and unremarkable other than what is documented in HPI.    Objective   /87 (BP Location: Left arm, Patient Position: Sitting, BP Cuff Size: Large adult)   Pulse 93   Ht 1.803 m (5' 11\")   Wt 101 kg (223 lb)   BMI 31.10 kg/m²     Gen: No acute distress, alert and oriented x3, pleasant   HEENT: moist mucous membranes, b/l external auditory canals are " clear of debris, TMs within normal limits, no oropharyngeal lesions, eomi, perrla   Neck: thyroid within normal limits, no lymphadenopathy   CV: RRR, normal S1/S2, no murmur   Resp: Clear to auscultation bilaterally, no wheezes or rhonchi appreciated  Abd: soft, nontender, non-distended, no guarding/rigidity, bowel sounds present  Extr: no edema, no calf tenderness  Derm: Skin is warm and dry, no rashes appreciated  Psych: mood is good, affect is congruent, good hygiene, normal speech and eye contact  Neuro: cranial nerves grossly intact, normal gait    Patient Health Questionnaire-2 Score: 0 (4/8/2025  9:36 AM)        Assessment/Plan   #CAD  #HTN  S/p two vessel bypass Nov 2022  Following with cardiology, Dr. Edwards  Currently on metoprolol, losartan, chlorthalidone, and atorvastatin  Work on weight loss  Triglycerides high for the first time  Discussed cutting back on beer specifically and fried foods  Recheck at follow up     #Bipolar disorder:   Reasonable control on cymbalta and lamictal  Following with psych     #RLS:   Not controlled on ropinirole  Pramipexole is working but he is taking high amount (1mg BID)  Switch to only taking at bedtime     #ED  Doing well on cialis     #BPH  Tamsulosin is not helping  Finasteride is helping more now  Stop tamsulosin at followup     #OA  Now on diclofenac BID  Referring to rheum to discuss medication options today     #Vocal cord irritation  On PPI  Work on losing 10lb and then we will try stopping     #H/o lymphoblastic lymphoma:   T cell lymphoma  s/p chemo and radiation for this 12 years ago     #Vitamin D deficiency   On replacement     HCM:  C-scope 2023 neg, next due 2033  Monitoring yearly PSA  S/p flu and shingles

## 2025-04-06 LAB
25(OH)D3+25(OH)D2 SERPL-MCNC: 93 NG/ML (ref 30–100)
ALBUMIN SERPL-MCNC: 4.7 G/DL (ref 3.6–5.1)
ALP SERPL-CCNC: 101 U/L (ref 35–144)
ALT SERPL-CCNC: 51 U/L (ref 9–46)
ANION GAP SERPL CALCULATED.4IONS-SCNC: 9 MMOL/L (CALC) (ref 7–17)
AST SERPL-CCNC: 42 U/L (ref 10–35)
BASOPHILS # BLD AUTO: 42 CELLS/UL (ref 0–200)
BASOPHILS NFR BLD AUTO: 0.7 %
BILIRUB SERPL-MCNC: 0.6 MG/DL (ref 0.2–1.2)
BUN SERPL-MCNC: 23 MG/DL (ref 7–25)
CALCIUM SERPL-MCNC: 9.7 MG/DL (ref 8.6–10.3)
CHLORIDE SERPL-SCNC: 101 MMOL/L (ref 98–110)
CHOLEST SERPL-MCNC: 232 MG/DL
CHOLEST/HDLC SERPL: 4.1 (CALC)
CO2 SERPL-SCNC: 30 MMOL/L (ref 20–32)
CREAT SERPL-MCNC: 0.9 MG/DL (ref 0.7–1.3)
EGFRCR SERPLBLD CKD-EPI 2021: 100 ML/MIN/1.73M2
EOSINOPHIL # BLD AUTO: 198 CELLS/UL (ref 15–500)
EOSINOPHIL NFR BLD AUTO: 3.3 %
ERYTHROCYTE [DISTWIDTH] IN BLOOD BY AUTOMATED COUNT: 12.9 % (ref 11–15)
GLUCOSE SERPL-MCNC: 109 MG/DL (ref 65–99)
HCT VFR BLD AUTO: 49.3 % (ref 38.5–50)
HDLC SERPL-MCNC: 57 MG/DL
HGB BLD-MCNC: 16.8 G/DL (ref 13.2–17.1)
LDLC SERPL CALC-MCNC: ABNORMAL MG/DL
LYMPHOCYTES # BLD AUTO: 1404 CELLS/UL (ref 850–3900)
LYMPHOCYTES NFR BLD AUTO: 23.4 %
MCH RBC QN AUTO: 32.4 PG (ref 27–33)
MCHC RBC AUTO-ENTMCNC: 34.1 G/DL (ref 32–36)
MCV RBC AUTO: 95.2 FL (ref 80–100)
MONOCYTES # BLD AUTO: 468 CELLS/UL (ref 200–950)
MONOCYTES NFR BLD AUTO: 7.8 %
NEUTROPHILS # BLD AUTO: 3888 CELLS/UL (ref 1500–7800)
NEUTROPHILS NFR BLD AUTO: 64.8 %
NONHDLC SERPL-MCNC: 175 MG/DL (CALC)
PLATELET # BLD AUTO: 189 THOUSAND/UL (ref 140–400)
PMV BLD REES-ECKER: 10.4 FL (ref 7.5–12.5)
POTASSIUM SERPL-SCNC: 4.7 MMOL/L (ref 3.5–5.3)
PROT SERPL-MCNC: 6.9 G/DL (ref 6.1–8.1)
RBC # BLD AUTO: 5.18 MILLION/UL (ref 4.2–5.8)
SODIUM SERPL-SCNC: 140 MMOL/L (ref 135–146)
TRIGL SERPL-MCNC: 480 MG/DL
WBC # BLD AUTO: 6 THOUSAND/UL (ref 3.8–10.8)

## 2025-04-08 ENCOUNTER — APPOINTMENT (OUTPATIENT)
Dept: PRIMARY CARE | Facility: CLINIC | Age: 60
End: 2025-04-08
Payer: COMMERCIAL

## 2025-04-08 VITALS
WEIGHT: 223 LBS | DIASTOLIC BLOOD PRESSURE: 78 MMHG | HEART RATE: 93 BPM | BODY MASS INDEX: 31.22 KG/M2 | SYSTOLIC BLOOD PRESSURE: 127 MMHG | HEIGHT: 71 IN

## 2025-04-08 DIAGNOSIS — C85.90 NON-HODGKIN'S LYMPHOMA, UNSPECIFIED BODY REGION, UNSPECIFIED NON-HODGKIN LYMPHOMA TYPE: Primary | ICD-10-CM

## 2025-04-08 DIAGNOSIS — R73.09 ELEVATED GLUCOSE: ICD-10-CM

## 2025-04-08 DIAGNOSIS — E78.2 MIXED HYPERLIPIDEMIA: ICD-10-CM

## 2025-04-08 DIAGNOSIS — G35 MULTIPLE SCLEROSIS (MULTI): ICD-10-CM

## 2025-04-08 DIAGNOSIS — R63.5 ABNORMAL WEIGHT GAIN: ICD-10-CM

## 2025-04-08 PROCEDURE — 3078F DIAST BP <80 MM HG: CPT | Performed by: FAMILY MEDICINE

## 2025-04-08 PROCEDURE — 99214 OFFICE O/P EST MOD 30 MIN: CPT | Performed by: FAMILY MEDICINE

## 2025-04-08 PROCEDURE — 3074F SYST BP LT 130 MM HG: CPT | Performed by: FAMILY MEDICINE

## 2025-04-08 PROCEDURE — 3008F BODY MASS INDEX DOCD: CPT | Performed by: FAMILY MEDICINE

## 2025-04-08 ASSESSMENT — PATIENT HEALTH QUESTIONNAIRE - PHQ9
SUM OF ALL RESPONSES TO PHQ9 QUESTIONS 1 AND 2: 0
2. FEELING DOWN, DEPRESSED OR HOPELESS: NOT AT ALL
1. LITTLE INTEREST OR PLEASURE IN DOING THINGS: NOT AT ALL

## 2025-04-15 ENCOUNTER — PHARMACY VISIT (OUTPATIENT)
Dept: PHARMACY | Facility: CLINIC | Age: 60
End: 2025-04-15
Payer: COMMERCIAL

## 2025-04-15 DIAGNOSIS — J38.3 LESION OF VOCAL FOLD: ICD-10-CM

## 2025-04-15 PROCEDURE — RXMED WILLOW AMBULATORY MEDICATION CHARGE

## 2025-04-15 RX ORDER — OMEPRAZOLE 20 MG/1
20 CAPSULE, DELAYED RELEASE ORAL
Qty: 60 CAPSULE | Refills: 1 | Status: SHIPPED | OUTPATIENT
Start: 2025-04-15 | End: 2025-06-14

## 2025-04-21 PROCEDURE — RXMED WILLOW AMBULATORY MEDICATION CHARGE

## 2025-04-22 ENCOUNTER — PHARMACY VISIT (OUTPATIENT)
Dept: PHARMACY | Facility: CLINIC | Age: 60
End: 2025-04-22
Payer: COMMERCIAL

## 2025-04-23 ENCOUNTER — APPOINTMENT (OUTPATIENT)
Dept: BEHAVIORAL HEALTH | Facility: CLINIC | Age: 60
End: 2025-04-23
Payer: COMMERCIAL

## 2025-04-23 DIAGNOSIS — F31.81 BIPOLAR 2 DISORDER (MULTI): ICD-10-CM

## 2025-04-23 PROCEDURE — 99214 OFFICE O/P EST MOD 30 MIN: CPT | Performed by: PSYCHIATRY & NEUROLOGY

## 2025-04-23 NOTE — PROGRESS NOTES
"  Maria Teresa Martinez MD  Physician  Psychiatry     Progress Notes     Signed                         Virtual or Telephone Consent     An interactive audio and video telecommunication system which permits real time communications between the patient (at the originating site) and provider (at the distant site) was utilized to provide this telehealth service.   Verbal consent was requested and obtained from Martín Mccormick on this date, 04/23/2025    Last visit: 03/12/2025 for a telehealth visit.     Subjective   Martní Mccormick, a 58 y.o. male, presenting to Psychiatry for follow up.     Patient is referred by Vanessa Hammer DO.      Went to in-laws for Easter - was difficult due to mom not being there  Dad has COVID and has to be quarantined for 15 days - can visit but have to gown up and wear masks  Feels he is doing well on the medications  Has gained weight though since last visit since starting the Remeron  Sleeping well   Feels he has a slight increase in energy   Feels more rested during the day  Has less anhedonia   Patient has not been angry recently  Feels he is \"lazy\" - still has unfinished projects  Has been feeling sad about his parents  Patient is  now getting along with his sister and she is calling him every day.  There was some kind of misunderstanding which they have now worked out    Taking Lamictal 200 mg PO qhs   Now taking Cymbalta 90 mg PO daily   Patient reports he is very close with his Dad   Feels the Cymbalta is much better than the Pristiq  Patient denies SI, HI, manic or psychotic symptoms.     Psychiatric Review Of Systems:  Depressive Symptoms: some improvement: anhedonia, concentration, energy, and interest  Manic Symptoms: negative  Anxiety Symptoms: General Anxiety Disorder (MILDRED)MILDRED Behaviors: excessive anxiety/worry, difficulty concentrating, easily fatigued, irritability, and restlessness  Psychotic Symptoms: negative  Other Symptoms:      Current Outpatient Psych Medications: "   Lamictal 200 mg PO qhs  Cymbalta 120 mg PO daily   Remeron 15  mg PO qhs                  Past Medical History:   Diagnosis Date    Bipolar II disorder (Multi)      Cardiology follow-up encounter 12/29/2022     Palmer Castellanos DO    Coronary artery disease       s/p CABG X2    Erectile dysfunction      Generalized anxiety disorder      Hyperlipidemia      Hypertension      Lesion of vocal fold      Lung nodules      Obesity      Other chest pain      Personal history of non-Hodgkin lymphomas      Restless legs syndrome      Social phobia, unspecified       Social anxiety disorder    Vitamin D deficiency              Past Psychiatric History:   Diagnoses: MILDRED, BAD II  Previous Psychiatrist:  none   Therapy/past treatments:  did talk therapy in the past but it did not work   Current psychiatric medications:  Lamictal and Cymbalta  Past psychiatric medications:  tried SSRIs, wellbutrin, Prozac made him tired; Effexor - did not work    Pristiq dry mouth  Hospitalizations:  none   Suicide attempts:  SI, never   Family psychiatric history:  unknown     Social History:   Currently lives: with wife  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[employer], electrical trade and heating/AC certification   : 2003;   2021   History of Learning Problem:   Work/Finances:  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  Use of alcohol: less frequent  Use of caffeine:  unknown  Use of other substances: denies  Legal consequences of substance use:  "n/a  Substance use disorder treatment: n/a     Record Review: brief     Medical Review Of Systems:  Pertinent items are noted in HPI.     Objective   Mental Status Exam  Appearance: dressed casually, good g/h  Attitude: Friendly. Calm, cooperative, and engaged in conversation.  Behavior: Appropriate eye contact.   Motor Activity: No psychomotor agitation or retardation. No abnormal movements, tremors or tics. No evidence of extrapyramidal symptoms or tardive dyskinesia.  Speech: Regular rate, rhythm, volume. Spontaneous, no pressured speech.  Mood: \"a little better\" less dysphoric  Affect: limited  range, mood congruent.  Thought Process: Linear, logical, and goal-directed. No loose associations or gross thought disorganization.  Thought Content: Denied current suicidal ideation or thoughts of harm to self, denied homicidal ideation or thoughts of harm to others. No delusional thinking elicited. No perseverations or obsessions identified.   Perception: Did not endorse auditory or visual hallucinations, did not appear to be responding to hallucinatory stimuli.   Cognition: Alert, oriented x3. Preserved attention span and concentration, recent and remote memory. Adequate fund of knowledge. No deficits in language.   Insight: Fair, in regards to understanding mental health condition  Judgement: Fair           Vitals:     05/08/24 0938   BP: 121/90   Pulse: 99   Temp: 36.7 °C (98.1 °F)      Wt: 200 lbs     BMI:  27.89     Falls Risk:  n/a     Risk Assessment:  Risk of harm to self: Low Risk -- Risk factors include: Age, Alcohol or other substance abuse, Gender, Loss (relational, social, occupational, financial) , Medical illness comorbidity , and Psychosocial stressors including mom broke hip, problems with sister  Protective factors include:Denies current suicidal ideation, Denies history of suicide attempts , Future-oriented talk , Willingness to seek help and support , Skills in problem solving, conflict resolution, " and nonviolent handling of disputes, History of adhering to treatment recommendations and/or prescribed medication regimen , Support through ongoing medical and mental healthcare relationships , Current/history of good response to treatment/meds , Interpersonal relationships and supports, e.g., family, friends, peers, community , and Restricted access to firearms or other lethal means of suicide      Risk of harm to others: Low Risk - Risk factors include: No significant risk factors identified on screening. Protective factors include: Lack of known history of harm to others , Lack of known history of violent ideation , Lack of known access to firearms , Sense of community, availability/access to resources and support , Sense of optimism, hope , Affect regulation , Sense of self-efficacy, internal locus of control , and Positive, pro-social family/peer network       DXS:   BAD II   MILDRED     Assessment:   Patient is a 59 year old male with BAD II on Lamictal 200 mg PO daily which he feels helps with mood stabilisation and Cymbalta 120 mg PO daily which is helping with anger.  He started Remeron 15 mg PO at bedtime and has some improvements in mood, motivation and energy. He is concerned that he has gained weight in the past month.  Will continue to monitor weight.       Discussed risks, benefits and side effects.      Patient denies SI, HI, manic or psychotic symptoms.          Plan/Recommendations:  Medications: continue Lamictal 200 mg PO at bedtime,   Cymbalta to 120 mg PO daily and Remeron 15 mg PO qhs  Follow up:  6 weeks  Call  Psychiatry at [phone] with issues.  For [facility] residents, [employer] is a 24/7 hotline you can call for assistance [913.353.7231]. Please call [phone] or go to your closest Emergency Room if you feel unsafe. This includes thoughts of hurting yourself or anyone else, or having other troubles such as hearing voices, seeing visions, or having new and scary thoughts about  the people around you.     Review with patient: Treatment plan reviewed with the patient.  Medication risks/benefit reviewed with the patient

## 2025-04-29 DIAGNOSIS — F31.81 BIPOLAR 2 DISORDER (MULTI): ICD-10-CM

## 2025-04-29 PROCEDURE — RXMED WILLOW AMBULATORY MEDICATION CHARGE

## 2025-04-29 RX ORDER — DULOXETIN HYDROCHLORIDE 60 MG/1
120 CAPSULE, DELAYED RELEASE ORAL DAILY
Qty: 60 CAPSULE | Refills: 2 | Status: SHIPPED | OUTPATIENT
Start: 2025-04-29 | End: 2025-07-29

## 2025-04-30 ENCOUNTER — PHARMACY VISIT (OUTPATIENT)
Dept: PHARMACY | Facility: CLINIC | Age: 60
End: 2025-04-30
Payer: COMMERCIAL

## 2025-05-02 ENCOUNTER — PHARMACY VISIT (OUTPATIENT)
Dept: PHARMACY | Facility: CLINIC | Age: 60
End: 2025-05-02
Payer: COMMERCIAL

## 2025-05-02 DIAGNOSIS — M19.90 ARTHRITIS: ICD-10-CM

## 2025-05-02 PROCEDURE — RXMED WILLOW AMBULATORY MEDICATION CHARGE

## 2025-05-02 RX ORDER — DICLOFENAC SODIUM 75 MG/1
75 TABLET, DELAYED RELEASE ORAL 2 TIMES DAILY PRN
Qty: 60 TABLET | Refills: 0 | Status: SHIPPED | OUTPATIENT
Start: 2025-05-02 | End: 2025-07-01

## 2025-05-07 DIAGNOSIS — R09.81 NASAL CONGESTION: ICD-10-CM

## 2025-05-07 DIAGNOSIS — I10 HYPERTENSION: ICD-10-CM

## 2025-05-07 PROCEDURE — RXMED WILLOW AMBULATORY MEDICATION CHARGE

## 2025-05-07 RX ORDER — FLUTICASONE PROPIONATE 50 MCG
2 SPRAY, SUSPENSION (ML) NASAL 2 TIMES DAILY
Qty: 16 G | Refills: 11 | Status: SHIPPED | OUTPATIENT
Start: 2025-05-07 | End: 2026-05-07

## 2025-05-07 RX ORDER — CHLORTHALIDONE 25 MG/1
25 TABLET ORAL
Qty: 90 TABLET | Refills: 3 | Status: CANCELLED | OUTPATIENT
Start: 2025-05-07 | End: 2026-05-07

## 2025-05-08 ENCOUNTER — PHARMACY VISIT (OUTPATIENT)
Dept: PHARMACY | Facility: CLINIC | Age: 60
End: 2025-05-08
Payer: COMMERCIAL

## 2025-05-08 DIAGNOSIS — I10 HYPERTENSION: ICD-10-CM

## 2025-05-08 PROCEDURE — RXMED WILLOW AMBULATORY MEDICATION CHARGE

## 2025-05-08 RX ORDER — CHLORTHALIDONE 25 MG/1
25 TABLET ORAL
Qty: 90 TABLET | Refills: 3 | Status: SHIPPED | OUTPATIENT
Start: 2025-05-08 | End: 2026-05-08

## 2025-05-21 ENCOUNTER — APPOINTMENT (OUTPATIENT)
Dept: BEHAVIORAL HEALTH | Facility: CLINIC | Age: 60
End: 2025-05-21
Payer: COMMERCIAL

## 2025-05-21 DIAGNOSIS — F31.81 BIPOLAR 2 DISORDER (MULTI): ICD-10-CM

## 2025-05-21 DIAGNOSIS — G47.00 INSOMNIA, UNSPECIFIED TYPE: ICD-10-CM

## 2025-05-21 PROCEDURE — 99214 OFFICE O/P EST MOD 30 MIN: CPT | Performed by: PSYCHIATRY & NEUROLOGY

## 2025-05-21 PROCEDURE — RXMED WILLOW AMBULATORY MEDICATION CHARGE

## 2025-05-21 RX ORDER — MIRTAZAPINE 7.5 MG/1
7.5 TABLET, FILM COATED ORAL NIGHTLY
Qty: 30 TABLET | Refills: 2 | Status: SHIPPED | OUTPATIENT
Start: 2025-05-21 | End: 2025-08-20

## 2025-05-21 RX ORDER — TRAZODONE HYDROCHLORIDE 50 MG/1
50 TABLET ORAL NIGHTLY
Qty: 30 TABLET | Refills: 0 | Status: SHIPPED | OUTPATIENT
Start: 2025-05-21 | End: 2025-06-21

## 2025-05-21 NOTE — PROGRESS NOTES
Outpatient Psychiatry    Virtual or Telephone Consent    An interactive audio and video telecommunication system which permits real time communications between the patient (at the originating site) and provider (at the distant site) was utilized to provide this telehealth service.   Verbal consent was requested and obtained from Martín Mccormick on this date, 05/21/25 for a telehealth visit and the patient's location was confirmed at the time of the visit.   Virtual or Telephone Consent     Last appointment:   04/23/2025     Subjective   Martín Mccormick, a 59 y.o. male, presenting to Psychiatry for follow up.     Patient is referred by Vanessa Hammer DO.       Just took motorcycle to see Dad   Dad is declining - yesterday he thought his wife was in the chair next to him  Last night he could not wake him up , he would not eat or open his eyes  Today he ate a lot and was completely different   Patient goes a couple times a day to see him   Patient has been feeling good but gained another five pounds   Since starting Remeron (3/12) he has gained about 8 lbs  Feels his appetite has increased   Tried tylenol pm and melatonin   Has been having issues with sleep which he has noticed more   Is very restless at night and tosses around a lot  Wife says he snores very loudly   Does not want to have sleep evaluation because if he has GONZALEZ he does not want to wear CPAP  Patient feels his mood is much better on the Remeron  Feels he has an increase in energy  and feels more rested during the day  Has less anhedonia   Patient has not been angry recently  Patient denies SI, HI, manic or psychotic symptoms.     Psychiatric Review Of Systems:  Depressive Symptoms: some improvement: anhedonia, concentration, energy, and interest  Manic Symptoms: negative  Anxiety Symptoms: General Anxiety Disorder (MILDRED)MILDRED Behaviors: excessive anxiety/worry, difficulty concentrating, easily fatigued,  irritability, and restlessness  Psychotic Symptoms: negative  Other Symptoms:      Current Outpatient Psych Medications:   Lamictal 200 mg PO qhs  Cymbalta 120 mg PO daily   Remeron 15  mg PO qhs                  Past Medical History:   Diagnosis Date    Bipolar II disorder (Multi)      Cardiology follow-up encounter 12/29/2022     Palmer Castellanos,     Coronary artery disease       s/p CABG X2    Erectile dysfunction      Generalized anxiety disorder      Hyperlipidemia      Hypertension      Lesion of vocal fold      Lung nodules      Obesity      Other chest pain      Personal history of non-Hodgkin lymphomas      Restless legs syndrome      Social phobia, unspecified       Social anxiety disorder    Vitamin D deficiency              Past Psychiatric History:   Diagnoses: MILDRED, BAD II  Previous Psychiatrist:  none   Therapy/past treatments:  did talk therapy in the past but it did not work   Current psychiatric medications:  Lamictal and Cymbalta  Past psychiatric medications:  tried SSRIs, wellbutrin, Prozac made him tired; Effexor - did not work    Pristiq dry mouth  Hospitalizations:  none   Suicide attempts:  SI, never   Family psychiatric history:  unknown     Social History:   Currently lives: with wife  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[employer], electrical trade and heating/AC certification   : 2003;   2021   History of Learning Problem:   Work/Finances:  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  Use of  "alcohol: less frequent  Use of caffeine:  unknown  Use of other substances: denies  Legal consequences of substance use: n/a  Substance use disorder treatment: n/a     Record Review: brief     Medical Review Of Systems:  Pertinent items are noted in HPI.     Objective   Mental Status Exam  Appearance: dressed casually, good g/h  Attitude: Friendly. Calm, cooperative, and engaged in conversation.  Behavior: Appropriate eye contact.   Motor Activity: No psychomotor agitation or retardation. No abnormal movements, tremors or tics. No evidence of extrapyramidal symptoms or tardive dyskinesia.  Speech: Regular rate, rhythm, volume. Spontaneous, no pressured speech.  Mood: \"better\" euthymic  Affect: full range, mood congruent.  Thought Process: Linear, logical, and goal-directed. No loose associations or gross thought disorganization.  Thought Content: Denied current suicidal ideation or thoughts of harm to self, denied homicidal ideation or thoughts of harm to others. No delusional thinking elicited. No perseverations or obsessions identified.   Perception: Did not endorse auditory or visual hallucinations, did not appear to be responding to hallucinatory stimuli.   Cognition: Alert, oriented x3. Preserved attention span and concentration, recent and remote memory. Adequate fund of knowledge. No deficits in language.   Insight: Fair, in regards to understanding mental health condition  Judgement: Fair  Wt: 200 lbs    BMI:  27.89     Falls Risk:  n/a     Risk Assessment:  Risk of harm to self: Low Risk -- Risk factors include: Age, Alcohol or other substance abuse, Gender, Loss (relational, social, occupational, financial) , Medical illness comorbidity , and Psychosocial stressors including mom broke hip, problems with sister  Protective factors include:Denies current suicidal ideation, Denies history of suicide attempts , Future-oriented talk , Willingness to seek help and support , Skills in problem solving, conflict " resolution, and nonviolent handling of disputes, History of adhering to treatment recommendations and/or prescribed medication regimen , Support through ongoing medical and mental healthcare relationships , Current/history of good response to treatment/meds , Interpersonal relationships and supports, e.g., family, friends, peers, community , and Restricted access to firearms or other lethal means of suicide      Risk of harm to others: Low Risk - Risk factors include: No significant risk factors identified on screening. Protective factors include: Lack of known history of harm to others , Lack of known history of violent ideation , Lack of known access to firearms , Sense of community, availability/access to resources and support , Sense of optimism, hope , Affect regulation , Sense of self-efficacy, internal locus of control , and Positive, pro-social family/peer network       DXS:   BAD II   MILDRED     Assessment:   Patient is a 59 year old male with BAD II on Lamictal 200 mg PO daily which he feels helps with mood stabilization and Cymbalta 90 mg PO daily which is helping with anger.  He started Remeron 15 mg PO at bedtime with much improvement in mood, motivation and energy but he has gained weight and his appetite has increased. Since starting the Remeron March 12, 2025 he has gained 8 lbs.   Will decrease the dose to 7.5 mg PO daily and continue to monitor weight and symptoms. .      Patient is going to think about whether he is interested in getting a referral to sleep medicine for snoring.     Patient denies SI, HI, manic or psychotic symptoms.          Plan/Recommendations:  Medications: continue Lamictal 200 mg PO at bedtime and Cymbalta 90 mg PO daily   Reduce Remeron to 7.5 mg PO qhs  Follow up:  4 weeks  Call  Psychiatry at [phone] with issues.  For [facility] residents, [employer] is a 24/7 hotline you can call for assistance [[phone]]. Please call [phone]/429 or go to your closest  Emergency Room if you feel unsafe. This includes thoughts of hurting yourself or anyone else, or having other troubles such as hearing voices, seeing visions, or having new and scary thoughts about the people around you.     Review with patient: Treatment plan reviewed with the patient.  Medication risks/benefit reviewed with the patient

## 2025-05-22 ENCOUNTER — PHARMACY VISIT (OUTPATIENT)
Dept: PHARMACY | Facility: CLINIC | Age: 60
End: 2025-05-22
Payer: COMMERCIAL

## 2025-06-02 ENCOUNTER — PHARMACY VISIT (OUTPATIENT)
Dept: PHARMACY | Facility: CLINIC | Age: 60
End: 2025-06-02
Payer: COMMERCIAL

## 2025-06-02 DIAGNOSIS — M19.90 ARTHRITIS: ICD-10-CM

## 2025-06-02 PROCEDURE — RXMED WILLOW AMBULATORY MEDICATION CHARGE

## 2025-06-02 RX ORDER — DICLOFENAC SODIUM 75 MG/1
75 TABLET, DELAYED RELEASE ORAL 2 TIMES DAILY PRN
Qty: 60 TABLET | Refills: 0 | Status: SHIPPED | OUTPATIENT
Start: 2025-06-02 | End: 2025-08-01

## 2025-06-09 DIAGNOSIS — J38.3 LESION OF VOCAL FOLD: ICD-10-CM

## 2025-06-09 PROCEDURE — RXMED WILLOW AMBULATORY MEDICATION CHARGE

## 2025-06-09 RX ORDER — OMEPRAZOLE 20 MG/1
20 CAPSULE, DELAYED RELEASE ORAL
Qty: 60 CAPSULE | Refills: 1 | Status: SHIPPED | OUTPATIENT
Start: 2025-06-09 | End: 2025-08-09

## 2025-06-10 ENCOUNTER — PHARMACY VISIT (OUTPATIENT)
Dept: PHARMACY | Facility: CLINIC | Age: 60
End: 2025-06-10
Payer: COMMERCIAL

## 2025-06-16 ENCOUNTER — PHARMACY VISIT (OUTPATIENT)
Dept: PHARMACY | Facility: CLINIC | Age: 60
End: 2025-06-16
Payer: COMMERCIAL

## 2025-06-16 PROCEDURE — RXMED WILLOW AMBULATORY MEDICATION CHARGE

## 2025-06-18 ENCOUNTER — APPOINTMENT (OUTPATIENT)
Dept: BEHAVIORAL HEALTH | Facility: CLINIC | Age: 60
End: 2025-06-18
Payer: COMMERCIAL

## 2025-06-18 DIAGNOSIS — G47.00 INSOMNIA, UNSPECIFIED TYPE: ICD-10-CM

## 2025-06-18 DIAGNOSIS — F31.81 BIPOLAR 2 DISORDER (MULTI): ICD-10-CM

## 2025-06-18 PROCEDURE — RXMED WILLOW AMBULATORY MEDICATION CHARGE

## 2025-06-18 PROCEDURE — 99214 OFFICE O/P EST MOD 30 MIN: CPT | Performed by: PSYCHIATRY & NEUROLOGY

## 2025-06-18 RX ORDER — LAMOTRIGINE 200 MG/1
200 TABLET, EXTENDED RELEASE ORAL DAILY
Qty: 90 TABLET | Refills: 1 | Status: SHIPPED | OUTPATIENT
Start: 2025-06-18 | End: 2025-12-15

## 2025-06-18 NOTE — PROGRESS NOTES
"                                                                                    Outpatient Psychiatry       Virtual or Telephone Consent    An interactive audio and video telecommunication system which permits real time communications between the patient (at the originating site) and provider (at the distant site) was utilized to provide this telehealth service.   Verbal consent was requested and obtained from Martín Mccormick on this date, 25 for a telehealth visit and the patient's location was confirmed at the time of the visit.    Last appointment:  25 for a telehealth visit and the patient's location was confirmed at the time of the visit.   Virtual or Telephone Consent      Subjective   Martín Mccormick, a 59 y.o. male, presenting to Psychiatry for follow up.     Patient is referred by Vanessa Hammer DO.    \Patient has some terrible news - he  lost his father last   Hospice came due to bed sore - gave him morphine   Patient came to see him and father work up when he said his name - patient left and sister called a little later and said he had passed - \"He waited for me\"  Burial was yesterday and now wearing his necklace  Mom's b-day 16th - he  right before this - feels he wanted to be with her for her birthday   Sister is five years older  Five grandchildren   Patient reduced the Remeron dose but still gained a few lbs  Trazodone did not work at all - tried - wife said he was yelling, moving around   Patient is not a \"stress eater\" but does feel his appetite increased on the Rermeron  Patient does not have the motivation to work out  Will discontinue remeron  Does not to have sleep evaluation because if he has GONZALEZ he does not want to wear CPAP  Patient feels his mood has been better on the Remeron but feels it is causing weight gain   Patient has not been angry recently  Patient denies SI, HI, manic or psychotic symptoms.     Psychiatric Review Of Systems:  Depressive Symptoms: " some improvement: anhedonia, concentration, energy, and interest  Manic Symptoms: negative  Anxiety Symptoms: General Anxiety Disorder (MILDRED)MILDRED Behaviors: excessive anxiety/worry, difficulty concentrating, easily fatigued, irritability, and restlessness  Psychotic Symptoms: negative  Other Symptoms:      Current Outpatient Psych Medications:   Lamictal 200 mg PO qhs  Cymbalta 120 mg PO daily   Remeron 7.5  mg PO qhs                  Past Medical History:   Diagnosis Date    Bipolar II disorder (Multi)      Cardiology follow-up encounter 12/29/2022     Palmer Castellanos DO    Coronary artery disease       s/p CABG X2    Erectile dysfunction      Generalized anxiety disorder      Hyperlipidemia      Hypertension      Lesion of vocal fold      Lung nodules      Obesity      Other chest pain      Personal history of non-Hodgkin lymphomas      Restless legs syndrome      Social phobia, unspecified       Social anxiety disorder    Vitamin D deficiency              Past Psychiatric History:   Diagnoses: MILDRED, BAD II  Previous Psychiatrist:  none   Therapy/past treatments:  did talk therapy in the past but it did not work   Current psychiatric medications:  Lamictal and Cymbalta  Past psychiatric medications:  tried SSRIs, wellbutrin, Prozac made him tired; Effexor - did not work    Pristiq dry mouth  Hospitalizations:  none   Suicide attempts:  SI, never   Family psychiatric history:  unknown     Social History:   Currently lives: with wife  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[employer], electrical trade and heating/AC certification   : 2003;   2021   History of Learning Problem:   Work/Finances:  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  Use of alcohol: less frequent  Use of caffeine:  unknown  Use of other substances: denies  Legal consequences of substance use: n/a  Substance use disorder treatment: n/a     Record Review: brief     Medical Review Of Systems:  Pertinent items are noted in HPI.     Objective   Mental Status Exam  Appearance: dressed casually, good g/h  Attitude: Friendly. Calm, cooperative, and engaged in conversation.  Behavior: Appropriate eye contact.   Motor Activity: No psychomotor agitation or retardation. No abnormal movements, tremors or tics. No evidence of extrapyramidal symptoms or tardive dyskinesia.  Speech: Regular rate, rhythm, volume. Spontaneous, no pressured speech.  Mood: \"okay\" euthymic  Affect: full range, mood congruent.  Thought Process: Linear, logical, and goal-directed. No loose associations or gross thought disorganization.  Thought Content: Denied current suicidal ideation or thoughts of harm to self, denied homicidal ideation or thoughts of harm to others. No delusional thinking elicited. No perseverations or obsessions identified.   Perception: Did not endorse auditory or visual hallucinations, did not appear to be responding to hallucinatory stimuli.   Cognition: Alert, oriented x3. Preserved attention span and concentration, recent and remote memory. Adequate fund of knowledge. No deficits in language.   Insight: Fair, in regards to understanding mental health condition  Judgement: Fair    Wt: 223 lbs     BMI:  31.10     Falls Risk:  n/a     Risk Assessment:  Risk of harm to self: Low Risk -- Risk factors include: Age, Alcohol or other substance abuse, Gender, Loss (relational, social, occupational, financial) , Medical illness comorbidity , and Psychosocial stressors including mom " broke hip, problems with sister  Protective factors include:Denies current suicidal ideation, Denies history of suicide attempts , Future-oriented talk , Willingness to seek help and support , Skills in problem solving, conflict resolution, and nonviolent handling of disputes, History of adhering to treatment recommendations and/or prescribed medication regimen , Support through ongoing medical and mental healthcare relationships , Current/history of good response to treatment/meds , Interpersonal relationships and supports, e.g., family, friends, peers, community , and Restricted access to firearms or other lethal means of suicide      Risk of harm to others: Low Risk - Risk factors include: No significant risk factors identified on screening. Protective factors include: Lack of known history of harm to others , Lack of known history of violent ideation , Lack of known access to firearms , Sense of community, availability/access to resources and support , Sense of optimism, hope , Affect regulation , Sense of self-efficacy, internal locus of control , and Positive, pro-social family/peer network       DXS:   BAD II   MILDRED     Assessment:   Patient is a 59 year old male with BAD II on Lamictal 200 mg PO daily which he feels helps with mood stabilization and Cymbalta 120 mg PO daily which is helping with anger.  Although the Remeron helped with his mood, he has gained weight since starting it and even gained weight on the reduced dose.  Will discontinue Remeron and follow up in a month to further assess weight issues and concerns.     Patient denies SI, HI, manic or psychotic symptoms.          Plan/Recommendations:  Medications: continue Lamictal 200 mg PO at bedtime and Cymbalta 90 mg PO daily   Discontinue Remeron   Follow up:  4 weeks  Call  Psychiatry at [phone] with issues.  For [facility] residents, [employer] is a 24/7 hotline you can call for assistance [[phone]]. Please call [phone] or  go to your closest Emergency Room if you feel unsafe. This includes thoughts of hurting yourself or anyone else, or having other troubles such as hearing voices, seeing visions, or having new and scary thoughts about the people around you.     Review with patient: Treatment plan reviewed with the patient.  Medication risks/benefit reviewed with the patient

## 2025-06-19 PROCEDURE — RXMED WILLOW AMBULATORY MEDICATION CHARGE

## 2025-06-20 ENCOUNTER — PHARMACY VISIT (OUTPATIENT)
Dept: PHARMACY | Facility: CLINIC | Age: 60
End: 2025-06-20
Payer: COMMERCIAL

## 2025-07-08 ENCOUNTER — PHARMACY VISIT (OUTPATIENT)
Dept: PHARMACY | Facility: CLINIC | Age: 60
End: 2025-07-08
Payer: COMMERCIAL

## 2025-07-08 DIAGNOSIS — M19.90 ARTHRITIS: ICD-10-CM

## 2025-07-08 PROCEDURE — RXMED WILLOW AMBULATORY MEDICATION CHARGE

## 2025-07-08 RX ORDER — DICLOFENAC SODIUM 75 MG/1
75 TABLET, DELAYED RELEASE ORAL 2 TIMES DAILY PRN
Qty: 60 TABLET | Refills: 0 | Status: SHIPPED | OUTPATIENT
Start: 2025-07-08 | End: 2025-09-06

## 2025-07-14 ENCOUNTER — PHARMACY VISIT (OUTPATIENT)
Dept: PHARMACY | Facility: CLINIC | Age: 60
End: 2025-07-14
Payer: COMMERCIAL

## 2025-07-14 PROCEDURE — RXMED WILLOW AMBULATORY MEDICATION CHARGE

## 2025-07-16 ENCOUNTER — APPOINTMENT (OUTPATIENT)
Dept: BEHAVIORAL HEALTH | Facility: CLINIC | Age: 60
End: 2025-07-16
Payer: COMMERCIAL

## 2025-07-16 DIAGNOSIS — F31.81 BIPOLAR 2 DISORDER (MULTI): ICD-10-CM

## 2025-07-16 DIAGNOSIS — G47.00 INSOMNIA, UNSPECIFIED TYPE: ICD-10-CM

## 2025-07-16 PROCEDURE — RXMED WILLOW AMBULATORY MEDICATION CHARGE

## 2025-07-16 PROCEDURE — 99214 OFFICE O/P EST MOD 30 MIN: CPT | Performed by: PSYCHIATRY & NEUROLOGY

## 2025-07-16 RX ORDER — DULOXETIN HYDROCHLORIDE 60 MG/1
60 CAPSULE, DELAYED RELEASE ORAL DAILY
Qty: 90 CAPSULE | Refills: 1 | Status: SHIPPED | OUTPATIENT
Start: 2025-07-16 | End: 2026-01-12

## 2025-07-16 NOTE — PROGRESS NOTES
"                                                                               Outpatient Psychiatry    Virtual or Telephone Consent    An interactive audio and video telecommunication system which permits real time communications between the patient (at the originating site) and provider (at the distant site) was utilized to provide this telehealth service.   Verbal consent was requested and obtained from Martín Mccormick on this date, 07/16/25 for a telehealth visit and the patient's location was confirmed at the time of the visit.    Last appointment:  06/18/25 for a telehealth visit and the patient's location was confirmed at the time of the visit.       Subjective   Martín Mccormick, a 59 y.o. male, presenting to Psychiatry for follow up.     Patient is referred by Vanessa Hammer DO.      Patient is doing okay.  He felt that the Cymbalta 120 mg was \"too much\" and he felt s \"sluggish and tired\".  He reduced the dose on his own to 60 mg PO daily and feels better.   He feels that he has more energy on the reduced dose.  Patient stopped the Remeron and has lost 10 lbs in the last month.  Does feel though that he has been \"starving\" himself with reduced calories. Feels his ideal weight is about 200 lbs.      Patient is going to his PCP in mid-August.   Patient reports that his mood in general is good.  He has been sleeping better and is getting 5-7 hours of sleep a night.   Has been doing a lot of thinking about what to do with parents house.  He had a   \"rough day\" yesterday as it was his father's b-day and he would have been 96 years old. He was very close with his Dad.   People are calling him about house which he has been frustrated about.  One couple who grew up there now want to downsize and keep calling him. Neither he or his sister are ready to make decisions about the house yet.  Patient reportrs that his mood has been stable and he has not gotten \"anger\" back as it used to be.   Concerned about his " "daughter who has failed her exam twice.  She is getting tutored.   Reports his daughter is \"short-tempered\" and gets angry easily but does not report this in sessions which he has encouraged her to do.   He is now taking Lamictal XR at night but he may try it in the am for a period of time.   Patient denies SI, HI, manic or psychotic symptoms.     Psychiatric Review Of Systems:  Depressive Symptoms: denies  Manic Symptoms: negative  Anxiety Symptoms: General Anxiety Disorder (MILDRED)MILDRED Behaviors: improved: excessive anxiety/worry, difficulty concentrating, easily fatigued, irritability, and restlessness  Psychotic Symptoms: negative  Other Symptoms:      Current Outpatient Psych Medications:   Lamictal  mg PO qhs  Cymbalta 60 mg PO daily                   Past Medical History:   Diagnosis Date    Bipolar II disorder (Multi)      Cardiology follow-up encounter 12/29/2022     Palmer Castellanos,     Coronary artery disease       s/p CABG X2    Erectile dysfunction      Generalized anxiety disorder      Hyperlipidemia      Hypertension      Lesion of vocal fold      Lung nodules      Obesity      Other chest pain      Personal history of non-Hodgkin lymphomas      Restless legs syndrome      Social phobia, unspecified       Social anxiety disorder    Vitamin D deficiency              Past Psychiatric History:   Diagnoses: MILDRED, BAD II  Previous Psychiatrist:  none   Therapy/past treatments:  did talk therapy in the past but it did not work   Current psychiatric medications:  Lamictal and Cymbalta  Past psychiatric medications:  tried SSRIs, wellbutrin, Prozac made him tired; Effexor - did not work    Pristiq dry mouth  Hospitalizations:  none   Suicide attempts:  SI, never   Family psychiatric history:  unknown     Social History:   Currently lives: with wife  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[employer], electrical trade and heating/AC certification   : 2003;   2021   History of Learning Problem:   Work/Finances:  wife " "Doc in nursing, Vice president of nursing - loves it (much younger); retired from  was     Family of origin: mom and dad recently passed away  Marital history/children:  one bio child - daughter age 22 who graduated from [employer] for [employer], two step children (14 and 16) at [employer]  Current stressors: loss of parents   Social support: wife  Legal History:  none    History:  none   History of violence: none  Access to Weapons: unknown     Substance Use History:  Tobacco use:  none but chews ?  Use of alcohol: less frequent  Use of caffeine:  unknown  Use of other substances: denies  Legal consequences of substance use: n/a  Substance use disorder treatment: n/a     Record Review: brief     Medical Review Of Systems:  Pertinent items are noted in HPI.     Objective   Mental Status Exam  Appearance: dressed casually, good g/h  Attitude: Friendly. Calm, cooperative, and engaged in conversation.  Behavior: Appropriate eye contact.   Motor Activity: No psychomotor agitation or retardation. No abnormal movements, tremors or tics. No evidence of extrapyramidal symptoms or tardive dyskinesia.  Speech: Regular rate, rhythm, volume. Spontaneous, no pressured speech.  Mood: \"not bad\" euthymic  Affect: full range, mood congruent.  Thought Process: Linear, logical, and goal-directed. No loose associations or gross thought disorganization.  Thought Content: Denied current suicidal ideation or thoughts of harm to self, denied homicidal ideation or thoughts of harm to others. No delusional thinking elicited. No perseverations or obsessions identified.   Perception: Did not endorse auditory or visual hallucinations, did not appear to be responding to hallucinatory stimuli.   Cognition: Alert, oriented x3. Preserved attention span and concentration, recent and remote memory. Adequate fund of knowledge. No deficits in language.   Insight: Fair, in regards to understanding mental health " condition  Judgement: Fair     Wt: 223 lbs     BMI:  31.10     Falls Risk:  low - has not fallen in last year      Risk Assessment:  Risk of harm to self: Low Risk -- Risk factors include: Age, Alcohol or other substance abuse, Gender, Loss (relational, social, occupational, financial) , Medical illness comorbidity , and Psychosocial stressors including mom broke hip, problems with sister  Protective factors include:Denies current suicidal ideation, Denies history of suicide attempts , Future-oriented talk , Willingness to seek help and support , Skills in problem solving, conflict resolution, and nonviolent handling of disputes, History of adhering to treatment recommendations and/or prescribed medication regimen , Support through ongoing medical and mental healthcare relationships , Current/history of good response to treatment/meds , Interpersonal relationships and supports, e.g., family, friends, peers, community , and Restricted access to firearms or other lethal means of suicide      Risk of harm to others: Low Risk - Risk factors include: No significant risk factors identified on screening. Protective factors include: Lack of known history of harm to others , Lack of known history of violent ideation , Lack of known access to firearms , Sense of community, availability/access to resources and support , Sense of optimism, hope , Affect regulation , Sense of self-efficacy, internal locus of control , and Positive, pro-social family/peer network       DXS:   BAD II   MILDRED     Assessment:   Patient is a 59 year old male with BAD II on Lamictal  mg PO daily which he feels helps with mood stabilization and now Cymbalta 60 mg PO daily which he reduced on his own due to thoughts it was making him tired.  He is feeling better on reduced dose.  Patient is reporting much less anger.  Still struggling with loss of both parents and deciding what to do with their house.     Patient may try the Lamictal in the am to  see if this works better for him.     He stopped the Remeron and has lost 10 lbs but also feels he is not eating enough intentionally in order to lose weight.       Patient denies SI, HI, manic or psychotic symptoms.          Plan/Recommendations:  Medications: continue Lamictal  mg PO at bedtime   Decrease Cymbalta to 60 mg PO daily   Follow up:  6 weeks  Call  Psychiatry at [phone] with issues.  For [facility] residents, [employer] is a 24/7 hotline you can call for assistance [210.725.9671]. Please call [phone] or go to your closest Emergency Room if you feel unsafe. This includes thoughts of hurting yourself or anyone else, or having other troubles such as hearing voices, seeing visions, or having new and scary thoughts about the people around you.     Review with patient: Treatment plan reviewed with the patient.  Medication risks/benefit reviewed with the patient

## 2025-07-17 ENCOUNTER — PHARMACY VISIT (OUTPATIENT)
Dept: PHARMACY | Facility: CLINIC | Age: 60
End: 2025-07-17
Payer: COMMERCIAL

## 2025-08-06 DIAGNOSIS — J38.3 LESION OF VOCAL FOLD: ICD-10-CM

## 2025-08-06 PROCEDURE — RXMED WILLOW AMBULATORY MEDICATION CHARGE

## 2025-08-06 RX ORDER — OMEPRAZOLE 20 MG/1
20 CAPSULE, DELAYED RELEASE ORAL
Qty: 60 CAPSULE | Refills: 1 | Status: SHIPPED | OUTPATIENT
Start: 2025-08-06 | End: 2025-10-05

## 2025-08-11 DIAGNOSIS — I25.10 CAD (CORONARY ARTERY DISEASE): ICD-10-CM

## 2025-08-11 PROCEDURE — RXMED WILLOW AMBULATORY MEDICATION CHARGE

## 2025-08-11 RX ORDER — ASPIRIN 81 MG/1
81 TABLET ORAL DAILY
Qty: 30 TABLET | Refills: 11 | Status: SHIPPED | OUTPATIENT
Start: 2025-08-11 | End: 2026-08-11

## 2025-08-12 ENCOUNTER — PHARMACY VISIT (OUTPATIENT)
Dept: PHARMACY | Facility: CLINIC | Age: 60
End: 2025-08-12
Payer: COMMERCIAL

## 2025-08-12 PROCEDURE — RXMED WILLOW AMBULATORY MEDICATION CHARGE

## 2025-08-13 ENCOUNTER — APPOINTMENT (OUTPATIENT)
Dept: PRIMARY CARE | Facility: CLINIC | Age: 60
End: 2025-08-13
Payer: COMMERCIAL

## 2025-08-15 ENCOUNTER — APPOINTMENT (OUTPATIENT)
Dept: PRIMARY CARE | Facility: CLINIC | Age: 60
End: 2025-08-15
Payer: COMMERCIAL

## 2025-08-20 DIAGNOSIS — G89.29 CHRONIC PAIN OF LEFT THUMB: Primary | ICD-10-CM

## 2025-08-20 DIAGNOSIS — M79.645 CHRONIC PAIN OF LEFT THUMB: Primary | ICD-10-CM

## 2025-08-22 ENCOUNTER — HOSPITAL ENCOUNTER (OUTPATIENT)
Dept: RADIOLOGY | Facility: HOSPITAL | Age: 60
Discharge: HOME | End: 2025-08-22
Payer: COMMERCIAL

## 2025-08-22 ENCOUNTER — OFFICE VISIT (OUTPATIENT)
Dept: ORTHOPEDIC SURGERY | Facility: HOSPITAL | Age: 60
End: 2025-08-22
Payer: COMMERCIAL

## 2025-08-22 DIAGNOSIS — M19.032 OSTEOARTHRITIS OF LEFT WRIST, UNSPECIFIED OSTEOARTHRITIS TYPE: Primary | ICD-10-CM

## 2025-08-22 DIAGNOSIS — M79.645 CHRONIC PAIN OF LEFT THUMB: ICD-10-CM

## 2025-08-22 DIAGNOSIS — G89.29 CHRONIC PAIN OF LEFT THUMB: ICD-10-CM

## 2025-08-22 PROCEDURE — 20606 DRAIN/INJ JOINT/BURSA W/US: CPT | Mod: LT | Performed by: ORTHOPAEDIC SURGERY

## 2025-08-22 PROCEDURE — 73130 X-RAY EXAM OF HAND: CPT | Mod: LT

## 2025-08-22 PROCEDURE — 99213 OFFICE O/P EST LOW 20 MIN: CPT | Performed by: ORTHOPAEDIC SURGERY

## 2025-08-22 PROCEDURE — 2500000004 HC RX 250 GENERAL PHARMACY W/ HCPCS (ALT 636 FOR OP/ED): Performed by: ORTHOPAEDIC SURGERY

## 2025-08-22 PROCEDURE — 99202 OFFICE O/P NEW SF 15 MIN: CPT | Mod: 25

## 2025-08-22 RX ADMIN — METHYLPREDNISOLONE ACETATE 30 MG: 40 INJECTION, SUSPENSION INTRA-ARTICULAR; INTRALESIONAL; INTRAMUSCULAR; SOFT TISSUE at 10:30

## 2025-08-22 RX ADMIN — LIDOCAINE HYDROCHLORIDE 1 ML: 10 INJECTION, SOLUTION INFILTRATION; PERINEURAL at 10:30

## 2025-08-22 ASSESSMENT — ENCOUNTER SYMPTOMS
WHEEZING: 0
FATIGUE: 0
FEVER: 0
CHILLS: 0
WOUND: 0
SORE THROAT: 0
SINUS PAIN: 0
JOINT SWELLING: 1
SHORTNESS OF BREATH: 0
ARTHRALGIAS: 1

## 2025-08-22 ASSESSMENT — PAIN - FUNCTIONAL ASSESSMENT: PAIN_FUNCTIONAL_ASSESSMENT: 0-10

## 2025-08-22 ASSESSMENT — PAIN SCALES - GENERAL: PAINLEVEL_OUTOF10: 7

## 2025-08-25 ENCOUNTER — PHARMACY VISIT (OUTPATIENT)
Dept: PHARMACY | Facility: CLINIC | Age: 60
End: 2025-08-25
Payer: COMMERCIAL

## 2025-08-25 ENCOUNTER — APPOINTMENT (OUTPATIENT)
Dept: BEHAVIORAL HEALTH | Facility: CLINIC | Age: 60
End: 2025-08-25
Payer: COMMERCIAL

## 2025-08-25 DIAGNOSIS — F31.81 BIPOLAR 2 DISORDER (MULTI): ICD-10-CM

## 2025-08-25 DIAGNOSIS — G47.00 INSOMNIA, UNSPECIFIED TYPE: ICD-10-CM

## 2025-08-25 PROCEDURE — 99214 OFFICE O/P EST MOD 30 MIN: CPT | Performed by: PSYCHIATRY & NEUROLOGY

## 2025-08-25 PROCEDURE — RXMED WILLOW AMBULATORY MEDICATION CHARGE

## 2025-08-25 RX ORDER — LAMOTRIGINE 200 MG/1
200 TABLET ORAL DAILY
Qty: 30 TABLET | Refills: 2 | Status: SHIPPED | OUTPATIENT
Start: 2025-08-25 | End: 2025-11-23

## 2025-08-25 RX ORDER — LIDOCAINE HYDROCHLORIDE 10 MG/ML
1 INJECTION, SOLUTION INFILTRATION; PERINEURAL
Status: COMPLETED | OUTPATIENT
Start: 2025-08-22 | End: 2025-08-22

## 2025-08-25 RX ORDER — DULOXETIN HYDROCHLORIDE 30 MG/1
30 CAPSULE, DELAYED RELEASE ORAL DAILY
Qty: 30 CAPSULE | Refills: 2 | Status: SHIPPED | OUTPATIENT
Start: 2025-08-25 | End: 2025-11-23

## 2025-08-25 RX ORDER — METHYLPREDNISOLONE ACETATE 40 MG/ML
30 INJECTION, SUSPENSION INTRA-ARTICULAR; INTRALESIONAL; INTRAMUSCULAR; SOFT TISSUE
Status: COMPLETED | OUTPATIENT
Start: 2025-08-22 | End: 2025-08-22

## 2025-08-28 DIAGNOSIS — M19.90 ARTHRITIS: ICD-10-CM

## 2025-08-29 ENCOUNTER — PHARMACY VISIT (OUTPATIENT)
Dept: PHARMACY | Facility: CLINIC | Age: 60
End: 2025-08-29
Payer: COMMERCIAL

## 2025-08-29 PROCEDURE — RXMED WILLOW AMBULATORY MEDICATION CHARGE

## 2025-08-29 RX ORDER — DICLOFENAC SODIUM 75 MG/1
75 TABLET, DELAYED RELEASE ORAL 2 TIMES DAILY PRN
Qty: 60 TABLET | Refills: 0 | Status: SHIPPED | OUTPATIENT
Start: 2025-08-29 | End: 2025-10-28

## 2025-09-03 ENCOUNTER — APPOINTMENT (OUTPATIENT)
Dept: PRIMARY CARE | Facility: CLINIC | Age: 60
End: 2025-09-03
Payer: COMMERCIAL

## 2025-09-03 LAB
ALBUMIN SERPL-MCNC: 4.1 G/DL (ref 3.6–5.1)
ALP SERPL-CCNC: 96 U/L (ref 35–144)
ALT SERPL-CCNC: 32 U/L (ref 9–46)
ANION GAP SERPL CALCULATED.4IONS-SCNC: 11 MMOL/L (CALC) (ref 7–17)
AST SERPL-CCNC: 30 U/L (ref 10–35)
BASOPHILS # BLD AUTO: 48 CELLS/UL (ref 0–200)
BASOPHILS NFR BLD AUTO: 0.7 %
BILIRUB SERPL-MCNC: 0.5 MG/DL (ref 0.2–1.2)
BUN SERPL-MCNC: 20 MG/DL (ref 7–25)
CALCIUM SERPL-MCNC: 9.2 MG/DL (ref 8.6–10.3)
CHLORIDE SERPL-SCNC: 102 MMOL/L (ref 98–110)
CHOLEST SERPL-MCNC: 195 MG/DL
CHOLEST/HDLC SERPL: 2.7 (CALC)
CO2 SERPL-SCNC: 28 MMOL/L (ref 20–32)
CREAT SERPL-MCNC: 0.9 MG/DL (ref 0.7–1.3)
EGFRCR SERPLBLD CKD-EPI 2021: 100 ML/MIN/1.73M2
EOSINOPHIL # BLD AUTO: 186 CELLS/UL (ref 15–500)
EOSINOPHIL NFR BLD AUTO: 2.7 %
ERYTHROCYTE [DISTWIDTH] IN BLOOD BY AUTOMATED COUNT: 12.6 % (ref 11–15)
EST. AVERAGE GLUCOSE BLD GHB EST-MCNC: 111 MG/DL
EST. AVERAGE GLUCOSE BLD GHB EST-SCNC: 6.2 MMOL/L
GLUCOSE SERPL-MCNC: 110 MG/DL (ref 65–99)
HBA1C MFR BLD: 5.5 %
HCT VFR BLD AUTO: 46.2 % (ref 38.5–50)
HDLC SERPL-MCNC: 73 MG/DL
HGB BLD-MCNC: 15.7 G/DL (ref 13.2–17.1)
LDLC SERPL CALC-MCNC: 101 MG/DL (CALC)
LYMPHOCYTES # BLD AUTO: 1208 CELLS/UL (ref 850–3900)
LYMPHOCYTES NFR BLD AUTO: 17.5 %
MCH RBC QN AUTO: 32.6 PG (ref 27–33)
MCHC RBC AUTO-ENTMCNC: 34 G/DL (ref 32–36)
MCV RBC AUTO: 96 FL (ref 80–100)
MONOCYTES # BLD AUTO: 607 CELLS/UL (ref 200–950)
MONOCYTES NFR BLD AUTO: 8.8 %
NEUTROPHILS # BLD AUTO: 4851 CELLS/UL (ref 1500–7800)
NEUTROPHILS NFR BLD AUTO: 70.3 %
NONHDLC SERPL-MCNC: 122 MG/DL (CALC)
PLATELET # BLD AUTO: 201 THOUSAND/UL (ref 140–400)
PMV BLD REES-ECKER: 9.6 FL (ref 7.5–12.5)
POTASSIUM SERPL-SCNC: 4 MMOL/L (ref 3.5–5.3)
PROT SERPL-MCNC: 6.6 G/DL (ref 6.1–8.1)
RBC # BLD AUTO: 4.81 MILLION/UL (ref 4.2–5.8)
SODIUM SERPL-SCNC: 141 MMOL/L (ref 135–146)
TRIGL SERPL-MCNC: 110 MG/DL
TSH SERPL-ACNC: 1.04 MIU/L (ref 0.4–4.5)
WBC # BLD AUTO: 6.9 THOUSAND/UL (ref 3.8–10.8)

## 2025-10-13 ENCOUNTER — APPOINTMENT (OUTPATIENT)
Dept: BEHAVIORAL HEALTH | Facility: CLINIC | Age: 60
End: 2025-10-13
Payer: COMMERCIAL

## 2025-11-05 ENCOUNTER — APPOINTMENT (OUTPATIENT)
Dept: PRIMARY CARE | Facility: CLINIC | Age: 60
End: 2025-11-05
Payer: COMMERCIAL

## (undated) DEVICE — MARKER, SKIN, DUAL TIP INK W/9 LABEL AND REMOVABLE TIME OUT SLEEVE

## (undated) DEVICE — TUBING, SUCTION, CONNECTING, STERILE 0.25 X 120 IN., LF

## (undated) DEVICE — DENTITION PROTECTOR, QUICKGUARD, NON-PERF

## (undated) DEVICE — SYRINGE, 1 CC, LUER LOCK

## (undated) DEVICE — CONTAINER, SPECIMEN, 120 ML, STERILE

## (undated) DEVICE — SYRINGE, 10 CC, LUER LOCK

## (undated) DEVICE — PAD, EYE, OVAL, 1 5/8 X 2 5/8 IN, STERILE

## (undated) DEVICE — DRESSING, NON-ADHERENT, TELFA, OUCHLESS, 3 X 8 IN, STERILE

## (undated) DEVICE — GLOVE, SURGEON, PREMIERPRO PI, SZ-6.5, PF, WH

## (undated) DEVICE — Device

## (undated) DEVICE — KIT, MINOR, DOUBLE BASIN

## (undated) DEVICE — SYRINGE, HYPODERMIC, TB, W/O NEEDLE, 1 CC, SLIP TIP